# Patient Record
Sex: FEMALE | Race: WHITE | Employment: UNEMPLOYED | URBAN - METROPOLITAN AREA
[De-identification: names, ages, dates, MRNs, and addresses within clinical notes are randomized per-mention and may not be internally consistent; named-entity substitution may affect disease eponyms.]

---

## 2018-04-26 ENCOUNTER — HOSPITAL ENCOUNTER (OUTPATIENT)
Age: 83
Discharge: HOME | End: 2018-04-28
Payer: COMMERCIAL

## 2018-04-26 DIAGNOSIS — E11.8 TYPE 2 DIABETES MELLITUS WITH COMPLICATION, WITHOUT LONG-TERM CURRENT USE OF INSULIN (HCC): ICD-10-CM

## 2018-04-26 LAB
ALBUMIN SERPL-MCNC: 4.1 G/DL (ref 3.5–5.2)
ALP BLD-CCNC: 74 U/L (ref 35–104)
ALT SERPL-CCNC: 16 U/L (ref 0–32)
ANION GAP SERPL CALCULATED.3IONS-SCNC: 18 MMOL/L (ref 7–16)
AST SERPL-CCNC: 30 U/L (ref 0–31)
BILIRUB SERPL-MCNC: 0.6 MG/DL (ref 0–1.2)
BUN BLDV-MCNC: 31 MG/DL (ref 8–23)
CALCIUM SERPL-MCNC: 9.5 MG/DL (ref 8.6–10.2)
CHLORIDE BLD-SCNC: 99 MMOL/L (ref 98–107)
CO2: 23 MMOL/L (ref 22–29)
CREAT SERPL-MCNC: 1.2 MG/DL (ref 0.5–1)
CREATININE URINE: 39 MG/DL (ref 29–226)
GFR AFRICAN AMERICAN: 51
GFR NON-AFRICAN AMERICAN: 42 ML/MIN/1.73
GLUCOSE BLD-MCNC: 126 MG/DL (ref 74–109)
HBA1C MFR BLD: 8.6 % (ref 4.8–5.9)
MICROALBUMIN UR-MCNC: 459.9 MG/L
MICROALBUMIN/CREAT UR-RTO: 1179.2 (ref 0–30)
POTASSIUM SERPL-SCNC: 3.8 MMOL/L (ref 3.5–5)
PROTEIN PROTEIN: 70 MG/DL (ref 0–12)
PROTEIN/CREAT RATIO: 1.8
PROTEIN/CREAT RATIO: 1.8 (ref 0–0.2)
SODIUM BLD-SCNC: 140 MMOL/L (ref 132–146)
TOTAL PROTEIN: 6.8 G/DL (ref 6.4–8.3)

## 2018-04-26 PROCEDURE — 82570 ASSAY OF URINE CREATININE: CPT

## 2018-04-26 PROCEDURE — 82044 UR ALBUMIN SEMIQUANTITATIVE: CPT

## 2018-04-26 PROCEDURE — 84156 ASSAY OF PROTEIN URINE: CPT

## 2018-04-26 PROCEDURE — 83036 HEMOGLOBIN GLYCOSYLATED A1C: CPT

## 2018-04-26 PROCEDURE — 80053 COMPREHEN METABOLIC PANEL: CPT

## 2018-08-02 ENCOUNTER — HOSPITAL ENCOUNTER (OUTPATIENT)
Age: 83
Discharge: HOME | End: 2018-08-04
Payer: COMMERCIAL

## 2018-08-02 DIAGNOSIS — E11.8 TYPE 2 DIABETES MELLITUS WITH COMPLICATION, WITHOUT LONG-TERM CURRENT USE OF INSULIN (HCC): ICD-10-CM

## 2018-08-02 DIAGNOSIS — I42.9 CARDIOMYOPATHY, UNSPECIFIED TYPE (HCC): ICD-10-CM

## 2018-08-02 LAB
ALBUMIN SERPL-MCNC: 3.9 G/DL (ref 3.5–5.2)
ALP BLD-CCNC: 81 U/L (ref 35–104)
ALT SERPL-CCNC: 23 U/L (ref 0–32)
ANION GAP SERPL CALCULATED.3IONS-SCNC: 18 MMOL/L (ref 7–16)
AST SERPL-CCNC: 39 U/L (ref 0–31)
BASOPHILS ABSOLUTE: 0.02 E9/L (ref 0–0.2)
BASOPHILS RELATIVE PERCENT: 0.2 % (ref 0–2)
BILIRUB SERPL-MCNC: 0.6 MG/DL (ref 0–1.2)
BUN BLDV-MCNC: 36 MG/DL (ref 8–23)
CALCIUM SERPL-MCNC: 9.1 MG/DL (ref 8.6–10.2)
CHLORIDE BLD-SCNC: 99 MMOL/L (ref 98–107)
CO2: 22 MMOL/L (ref 22–29)
CREAT SERPL-MCNC: 1.3 MG/DL (ref 0.5–1)
EOSINOPHILS ABSOLUTE: 0.09 E9/L (ref 0.05–0.5)
EOSINOPHILS RELATIVE PERCENT: 1.1 % (ref 0–6)
GFR AFRICAN AMERICAN: 46
GFR NON-AFRICAN AMERICAN: 38 ML/MIN/1.73
GLUCOSE BLD-MCNC: 132 MG/DL (ref 74–109)
HBA1C MFR BLD: 8.4 % (ref 4–5.6)
HCT VFR BLD CALC: 37.1 % (ref 34–48)
HEMOGLOBIN: 10.8 G/DL (ref 11.5–15.5)
IMMATURE GRANULOCYTES #: 0.06 E9/L
IMMATURE GRANULOCYTES %: 0.7 % (ref 0–5)
LYMPHOCYTES ABSOLUTE: 1.15 E9/L (ref 1.5–4)
LYMPHOCYTES RELATIVE PERCENT: 14.4 % (ref 20–42)
MCH RBC QN AUTO: 22.2 PG (ref 26–35)
MCHC RBC AUTO-ENTMCNC: 29.1 % (ref 32–34.5)
MCV RBC AUTO: 76.2 FL (ref 80–99.9)
MONOCYTES ABSOLUTE: 0.76 E9/L (ref 0.1–0.95)
MONOCYTES RELATIVE PERCENT: 9.5 % (ref 2–12)
NEUTROPHILS ABSOLUTE: 5.93 E9/L (ref 1.8–7.3)
NEUTROPHILS RELATIVE PERCENT: 74.1 % (ref 43–80)
PDW BLD-RTO: 20 FL (ref 11.5–15)
PLATELET # BLD: 231 E9/L (ref 130–450)
PMV BLD AUTO: 10.7 FL (ref 7–12)
POTASSIUM SERPL-SCNC: 4 MMOL/L (ref 3.5–5)
RBC # BLD: 4.87 E12/L (ref 3.5–5.5)
SODIUM BLD-SCNC: 139 MMOL/L (ref 132–146)
TOTAL PROTEIN: 6.7 G/DL (ref 6.4–8.3)
WBC # BLD: 8 E9/L (ref 4.5–11.5)

## 2018-08-02 PROCEDURE — 85025 COMPLETE CBC W/AUTO DIFF WBC: CPT

## 2018-08-02 PROCEDURE — 80053 COMPREHEN METABOLIC PANEL: CPT

## 2018-08-02 PROCEDURE — 83036 HEMOGLOBIN GLYCOSYLATED A1C: CPT

## 2018-11-23 ENCOUNTER — HOSPITAL ENCOUNTER (OUTPATIENT)
Age: 83
Discharge: HOME | End: 2018-11-23
Payer: COMMERCIAL

## 2018-11-23 DIAGNOSIS — E78.2 MIXED HYPERLIPIDEMIA: ICD-10-CM

## 2018-11-23 DIAGNOSIS — I10 ESSENTIAL HYPERTENSION: ICD-10-CM

## 2018-11-23 DIAGNOSIS — E11.8 TYPE 2 DIABETES MELLITUS WITH COMPLICATION, WITHOUT LONG-TERM CURRENT USE OF INSULIN (HCC): ICD-10-CM

## 2018-11-23 DIAGNOSIS — E55.9 VITAMIN D DEFICIENCY: ICD-10-CM

## 2018-11-23 LAB
ALBUMIN SERPL-MCNC: 3.7 G/DL (ref 3.5–5.2)
ALP BLD-CCNC: 79 U/L (ref 35–104)
ALT SERPL-CCNC: 15 U/L (ref 0–32)
ANION GAP SERPL CALCULATED.3IONS-SCNC: 15 MMOL/L (ref 7–16)
AST SERPL-CCNC: 23 U/L (ref 0–31)
BASOPHILS ABSOLUTE: 0.03 E9/L (ref 0–0.2)
BASOPHILS RELATIVE PERCENT: 0.4 % (ref 0–2)
BILIRUB SERPL-MCNC: 0.5 MG/DL (ref 0–1.2)
BUN BLDV-MCNC: 34 MG/DL (ref 8–23)
CALCIUM SERPL-MCNC: 8.8 MG/DL (ref 8.6–10.2)
CHLORIDE BLD-SCNC: 103 MMOL/L (ref 98–107)
CHOLESTEROL, TOTAL: 205 MG/DL (ref 0–199)
CO2: 22 MMOL/L (ref 22–29)
CREAT SERPL-MCNC: 1.5 MG/DL (ref 0.5–1)
EOSINOPHILS ABSOLUTE: 0.1 E9/L (ref 0.05–0.5)
EOSINOPHILS RELATIVE PERCENT: 1.3 % (ref 0–6)
GFR AFRICAN AMERICAN: 39
GFR NON-AFRICAN AMERICAN: 32 ML/MIN/1.73
GLUCOSE BLD-MCNC: 169 MG/DL (ref 74–99)
HBA1C MFR BLD: 8.6 % (ref 4–5.6)
HCT VFR BLD CALC: 32.5 % (ref 34–48)
HDLC SERPL-MCNC: 43 MG/DL
HEMOGLOBIN: 9.7 G/DL (ref 11.5–15.5)
IMMATURE GRANULOCYTES #: 0.08 E9/L
IMMATURE GRANULOCYTES %: 1 % (ref 0–5)
LDL CHOLESTEROL CALCULATED: 118 MG/DL (ref 0–99)
LYMPHOCYTES ABSOLUTE: 1.21 E9/L (ref 1.5–4)
LYMPHOCYTES RELATIVE PERCENT: 15.6 % (ref 20–42)
MCH RBC QN AUTO: 22.9 PG (ref 26–35)
MCHC RBC AUTO-ENTMCNC: 29.8 % (ref 32–34.5)
MCV RBC AUTO: 76.8 FL (ref 80–99.9)
MONOCYTES ABSOLUTE: 0.67 E9/L (ref 0.1–0.95)
MONOCYTES RELATIVE PERCENT: 8.7 % (ref 2–12)
NEUTROPHILS ABSOLUTE: 5.65 E9/L (ref 1.8–7.3)
NEUTROPHILS RELATIVE PERCENT: 73 % (ref 43–80)
PDW BLD-RTO: 18.2 FL (ref 11.5–15)
PLATELET # BLD: 246 E9/L (ref 130–450)
PMV BLD AUTO: 11.1 FL (ref 7–12)
POTASSIUM SERPL-SCNC: 3.9 MMOL/L (ref 3.5–5)
RBC # BLD: 4.23 E12/L (ref 3.5–5.5)
SODIUM BLD-SCNC: 140 MMOL/L (ref 132–146)
TOTAL PROTEIN: 6.4 G/DL (ref 6.4–8.3)
TRIGL SERPL-MCNC: 219 MG/DL (ref 0–149)
VITAMIN D 25-HYDROXY: 20 NG/ML (ref 30–100)
VLDLC SERPL CALC-MCNC: 44 MG/DL
WBC # BLD: 7.7 E9/L (ref 4.5–11.5)

## 2018-11-23 PROCEDURE — 80053 COMPREHEN METABOLIC PANEL: CPT

## 2018-11-23 PROCEDURE — 85025 COMPLETE CBC W/AUTO DIFF WBC: CPT

## 2018-11-23 PROCEDURE — 80061 LIPID PANEL: CPT

## 2018-11-23 PROCEDURE — 83036 HEMOGLOBIN GLYCOSYLATED A1C: CPT

## 2018-11-23 PROCEDURE — 82306 VITAMIN D 25 HYDROXY: CPT

## 2018-11-23 PROCEDURE — 36415 COLL VENOUS BLD VENIPUNCTURE: CPT

## 2019-05-15 ENCOUNTER — TELEPHONE (OUTPATIENT)
Dept: PHARMACY | Facility: CLINIC | Age: 84
End: 2019-05-15

## 2019-05-15 NOTE — TELEPHONE ENCOUNTER
CLINICAL PHARMACY: ADHERENCE REVIEW    Identified care gap per [employer]: Glucotrol XL 5 mg adherence  Per records, appears 30-day supply last filled 3/11/19    Per Jose Antonio Triana: [facility]: [phone]:  Glucotrol XL 5 mg last filled on 5/10/19 for a 30-day supply. Per [facility] the prescription is written for 90-day supply but the patients insurance will not cover Brand Name medications; Patient is receiving assistance from the Pharmacy: Cost: $56.92 / 30-day supply    Reached patient's daughter: Yasir Cedeño to review. Barrier(s) identified: Patient had side effects with the generic medication  Education provided.

## 2019-05-15 NOTE — TELEPHONE ENCOUNTER
CLINICAL PHARMACY CONSULT: MED RECONCILIATION/REVIEW ADDENDUM    For Pharmacy Admin Tracking Only    PHSO: Yes  Total # of Interventions Recommended: 1  - Maintenance Safety Lab Monitoring #: 1  - New Therapy Lab Monitoring #: 1  Recommended intervention potential cost savings: 0  Time Spent (min): 1000 [facility], [address] [place] [address]

## 2019-05-17 ENCOUNTER — HOSPITAL ENCOUNTER (OUTPATIENT)
Age: 84
Discharge: HOME | End: 2019-05-17
Payer: COMMERCIAL

## 2019-05-17 ENCOUNTER — HOSPITAL ENCOUNTER (OUTPATIENT)
Dept: INFUSION THERAPY | Age: 84
Discharge: HOME | End: 2019-05-17
Payer: COMMERCIAL

## 2019-05-17 VITALS
DIASTOLIC BLOOD PRESSURE: 67 MMHG | SYSTOLIC BLOOD PRESSURE: 151 MMHG | RESPIRATION RATE: 18 BRPM | OXYGEN SATURATION: 98 % | HEART RATE: 77 BPM | TEMPERATURE: 98.1 F

## 2019-05-17 DIAGNOSIS — D64.9 ANEMIA, UNSPECIFIED TYPE: Primary | ICD-10-CM

## 2019-05-17 LAB
ABO/RH: NORMAL
ANTIBODY SCREEN: NORMAL
BLOOD BANK DISPENSE STATUS: NORMAL
BLOOD BANK PRODUCT CODE: NORMAL
BPU ID: NORMAL
DESCRIPTION BLOOD BANK: NORMAL

## 2019-05-17 PROCEDURE — 36415 COLL VENOUS BLD VENIPUNCTURE: CPT

## 2019-05-17 PROCEDURE — 86923 COMPATIBILITY TEST ELECTRIC: CPT

## 2019-05-17 PROCEDURE — 2580000003 HC RX 258

## 2019-05-17 PROCEDURE — 2580000003 HC RX 258: Performed by: INTERNAL MEDICINE

## 2019-05-17 PROCEDURE — 36430 TRANSFUSION BLD/BLD COMPNT: CPT

## 2019-05-17 PROCEDURE — P9016 RBC LEUKOCYTES REDUCED: HCPCS

## 2019-05-17 PROCEDURE — 6370000000 HC RX 637 (ALT 250 FOR IP)

## 2019-05-17 PROCEDURE — 86900 BLOOD TYPING SEROLOGIC ABO: CPT

## 2019-05-17 PROCEDURE — 86901 BLOOD TYPING SEROLOGIC RH(D): CPT

## 2019-05-17 PROCEDURE — 86850 RBC ANTIBODY SCREEN: CPT

## 2019-05-17 RX ORDER — METHYLPREDNISOLONE SODIUM SUCCINATE 125 MG/2ML
125 INJECTION, POWDER, LYOPHILIZED, FOR SOLUTION INTRAMUSCULAR; INTRAVENOUS ONCE
Status: CANCELLED | OUTPATIENT
Start: 2019-05-17

## 2019-05-17 RX ORDER — EPINEPHRINE 1 MG/ML
0.3 INJECTION, SOLUTION, CONCENTRATE INTRAVENOUS PRN
Status: CANCELLED | OUTPATIENT
Start: 2019-05-17

## 2019-05-17 RX ORDER — 0.9 % SODIUM CHLORIDE 0.9 %
10 VIAL (ML) INJECTION ONCE
Status: CANCELLED | OUTPATIENT
Start: 2019-05-17

## 2019-05-17 RX ORDER — DIPHENHYDRAMINE HCL 25 MG
25 TABLET ORAL ONCE
Status: COMPLETED | OUTPATIENT
Start: 2019-05-17 | End: 2019-05-17

## 2019-05-17 RX ORDER — ACETAMINOPHEN 325 MG/1
650 TABLET ORAL ONCE
Status: COMPLETED | OUTPATIENT
Start: 2019-05-17 | End: 2019-05-17

## 2019-05-17 RX ORDER — DIPHENHYDRAMINE HCL 25 MG
TABLET ORAL
Status: COMPLETED
Start: 2019-05-17 | End: 2019-05-17

## 2019-05-17 RX ORDER — SODIUM CHLORIDE 9 MG/ML
INJECTION, SOLUTION INTRAVENOUS CONTINUOUS
Status: CANCELLED | OUTPATIENT
Start: 2019-05-17

## 2019-05-17 RX ORDER — 0.9 % SODIUM CHLORIDE 0.9 %
250 INTRAVENOUS SOLUTION INTRAVENOUS ONCE
Status: CANCELLED | OUTPATIENT
Start: 2019-05-17

## 2019-05-17 RX ORDER — SODIUM CHLORIDE 9 MG/ML
INJECTION, SOLUTION INTRAVENOUS
Status: COMPLETED
Start: 2019-05-17 | End: 2019-05-17

## 2019-05-17 RX ORDER — DIPHENHYDRAMINE HCL 25 MG
25 TABLET ORAL ONCE
Status: CANCELLED | OUTPATIENT
Start: 2019-05-17

## 2019-05-17 RX ORDER — 0.9 % SODIUM CHLORIDE 0.9 %
250 INTRAVENOUS SOLUTION INTRAVENOUS ONCE
Status: COMPLETED | OUTPATIENT
Start: 2019-05-17 | End: 2019-05-17

## 2019-05-17 RX ORDER — DIPHENHYDRAMINE HYDROCHLORIDE 50 MG/ML
50 INJECTION INTRAMUSCULAR; INTRAVENOUS ONCE
Status: CANCELLED | OUTPATIENT
Start: 2019-05-17

## 2019-05-17 RX ORDER — SODIUM CHLORIDE 0.9 % (FLUSH) 0.9 %
20 SYRINGE (ML) INJECTION PRN
Status: CANCELLED | OUTPATIENT
Start: 2019-05-17

## 2019-05-17 RX ORDER — SODIUM CHLORIDE 0.9 % (FLUSH) 0.9 %
20 SYRINGE (ML) INJECTION PRN
Status: DISCONTINUED | OUTPATIENT
Start: 2019-05-17 | End: 2019-05-18 | Stop reason: HOSPADM

## 2019-05-17 RX ORDER — ACETAMINOPHEN 325 MG/1
650 TABLET ORAL ONCE
Status: CANCELLED | OUTPATIENT
Start: 2019-05-17

## 2019-05-17 RX ORDER — ACETAMINOPHEN 325 MG/1
TABLET ORAL
Status: COMPLETED
Start: 2019-05-17 | End: 2019-05-17

## 2019-05-17 RX ADMIN — ACETAMINOPHEN 650 MG: 325 TABLET ORAL at 14:03

## 2019-05-17 RX ADMIN — SODIUM CHLORIDE 250 ML: 9 INJECTION, SOLUTION INTRAVENOUS at 14:09

## 2019-05-17 RX ADMIN — Medication 250 ML: at 14:09

## 2019-05-17 RX ADMIN — Medication 25 MG: at 14:02

## 2019-05-17 RX ADMIN — DIPHENHYDRAMINE HCL 25 MG: 25 TABLET ORAL at 14:02

## 2019-05-17 RX ADMIN — Medication 10 ML: at 13:55

## 2019-05-17 RX ADMIN — ACETAMINOPHEN 650 MG: 325 TABLET, FILM COATED ORAL at 14:03

## 2019-05-17 ASSESSMENT — PAIN SCALES - GENERAL: PAINLEVEL_OUTOF10: 10

## 2019-05-17 ASSESSMENT — PAIN DESCRIPTION - LOCATION: LOCATION: HIP

## 2019-05-17 ASSESSMENT — PAIN DESCRIPTION - ORIENTATION: ORIENTATION: RIGHT

## 2019-05-17 ASSESSMENT — PAIN DESCRIPTION - PAIN TYPE: TYPE: CHRONIC PAIN

## 2019-06-11 ENCOUNTER — TELEPHONE (OUTPATIENT)
Dept: NON INVASIVE DIAGNOSTICS | Age: 84
End: 2019-06-11

## 2019-06-11 NOTE — TELEPHONE ENCOUNTER
Spoke with daughter, Eleno Alexandra regarding upcoming appoinment on June 13, 2019. Instructions given. Barbie Guajardo

## 2019-06-17 ENCOUNTER — TELEPHONE (OUTPATIENT)
Dept: NON INVASIVE DIAGNOSTICS | Age: 84
End: 2019-06-17

## 2019-06-18 ENCOUNTER — TELEPHONE (OUTPATIENT)
Dept: CARDIAC CATH/INVASIVE PROCEDURES | Age: 84
End: 2019-06-18

## 2019-06-19 ENCOUNTER — ANESTHESIA (OUTPATIENT)
Dept: CARDIAC CATH/INVASIVE PROCEDURES | Age: 84
End: 2019-06-19

## 2019-06-19 ENCOUNTER — HOSPITAL ENCOUNTER (OUTPATIENT)
Dept: CARDIAC CATH/INVASIVE PROCEDURES | Age: 84
Discharge: HOME | End: 2019-06-19
Payer: COMMERCIAL

## 2019-06-19 ENCOUNTER — ANESTHESIA EVENT (OUTPATIENT)
Dept: CARDIAC CATH/INVASIVE PROCEDURES | Age: 84
End: 2019-06-19

## 2019-06-19 VITALS
HEART RATE: 84 BPM | BODY MASS INDEX: 30.36 KG/M2 | RESPIRATION RATE: 20 BRPM | TEMPERATURE: 97.7 F | DIASTOLIC BLOOD PRESSURE: 69 MMHG | SYSTOLIC BLOOD PRESSURE: 110 MMHG | HEIGHT: 62 IN | WEIGHT: 165 LBS

## 2019-06-19 VITALS
DIASTOLIC BLOOD PRESSURE: 46 MMHG | RESPIRATION RATE: 20 BRPM | OXYGEN SATURATION: 98 % | SYSTOLIC BLOOD PRESSURE: 86 MMHG

## 2019-06-19 PROCEDURE — 2580000003 HC RX 258: Performed by: NURSE ANESTHETIST, CERTIFIED REGISTERED

## 2019-06-19 PROCEDURE — 3700000000 HC ANESTHESIA ATTENDED CARE

## 2019-06-19 PROCEDURE — 3700000001 HC ADD 15 MINUTES (ANESTHESIA)

## 2019-06-19 PROCEDURE — 6360000002 HC RX W HCPCS: Performed by: NURSE ANESTHETIST, CERTIFIED REGISTERED

## 2019-06-19 PROCEDURE — 2709999900 HC NON-CHARGEABLE SUPPLY

## 2019-06-19 PROCEDURE — 93312 ECHO TRANSESOPHAGEAL: CPT

## 2019-06-19 PROCEDURE — 93325 DOPPLER ECHO COLOR FLOW MAPG: CPT

## 2019-06-19 PROCEDURE — 2580000003 HC RX 258: Performed by: INTERNAL MEDICINE

## 2019-06-19 PROCEDURE — 93321 DOPPLER ECHO F-UP/LMTD STD: CPT

## 2019-06-19 RX ORDER — SODIUM CHLORIDE 9 MG/ML
INJECTION, SOLUTION INTRAVENOUS CONTINUOUS PRN
Status: DISCONTINUED | OUTPATIENT
Start: 2019-06-19 | End: 2019-06-19 | Stop reason: SDUPTHER

## 2019-06-19 RX ORDER — SODIUM CHLORIDE 9 MG/ML
INJECTION, SOLUTION INTRAVENOUS ONCE
Status: COMPLETED | OUTPATIENT
Start: 2019-06-19 | End: 2019-06-19

## 2019-06-19 RX ORDER — PROPOFOL 10 MG/ML
INJECTION, EMULSION INTRAVENOUS PRN
Status: DISCONTINUED | OUTPATIENT
Start: 2019-06-19 | End: 2019-06-19 | Stop reason: SDUPTHER

## 2019-06-19 RX ADMIN — SODIUM CHLORIDE: 9 INJECTION, SOLUTION INTRAVENOUS at 15:20

## 2019-06-19 RX ADMIN — PROPOFOL 160 MG: 10 INJECTION, EMULSION INTRAVENOUS at 15:26

## 2019-06-19 RX ADMIN — SODIUM CHLORIDE: 9 INJECTION, SOLUTION INTRAVENOUS at 13:23

## 2019-06-19 ASSESSMENT — LIFESTYLE VARIABLES: SMOKING_STATUS: 0

## 2019-06-19 NOTE — H&P
Department of Medicine  Section of Cardiology  Attending Procedure History and Physical        Pre-Procedural Diagnosis: Mitral valve regurgitation    Indications: Mitral valve regurgitation    Procedure Planned: Transesophageal echocardiogram to evaluate the severity of mitral valve regurgitation    History obtained from patient    History of Present Illness: The patient is a 80 y.o. female who presents for the above procedure.   With severe mitral valve regurgitation, is here for transesophageal echocardiogram for further evaluation of the mitral valve apparatus prior to surgery versus mitral clip repair of mitral valve regurgitation    Past Medical History:        Diagnosis Date    Arthritis     CHF (congestive heart failure) (HCC)     CKD (chronic kidney disease)     Diabetic eye exam ([facility] 05/21/2015    Dr Kiara Garcia diabetic retinopathy    Hyperlipidemia     Hypertension     Iron deficiency anemia     Type II or unspecified type diabetes mellitus without mention of complication, not stated as uncontrolled     Ulcer of foot ([facility] [address] 6/18/2013    Valvular heart disease      Past Surgical History:        Procedure Laterality Date    ADENOIDECTOMY      APPENDECTOMY      CARPAL TUNNEL RELEASE      CATARACT REMOVAL      CATARACT REMOVAL      CHOLECYSTECTOMY      DOPPLER ECHOCARDIOGRAPHY  06/07/2018    Dr Keith Martinez mitral regurg-mild tricuspid regurg-trace pulmonic regurg-trace to mild aortic regurg    ENDOSCOPY, COLON, DIAGNOSTIC      JOINT REPLACEMENT      BILAT KNEE    ROTATOR CUFF REPAIR      TONSILLECTOMY      TOTAL KNEE ARTHROPLASTY      TRANSESOPHAGEAL ECHOCARDIOGRAM  06/19/2019    DR Maldonado    UPPER GASTROINTESTINAL ENDOSCOPY  05/28/2015    UPPER GASTROINTESTINAL ENDOSCOPY  11/07/2017    DR JEFF SULLIVAN    US KIDNEY DUPLEX BILAT  09/17/2016    S[facility]ep  Imaging-normal    VASCULAR SURGERY  4/18/2013    Right leg angiogram by DR Francis     Medications:    Current petechiae  ALLERGIC/IMMUNOLOGIC:  negative for urticaria, hay fever and angioedema  ENDOCRINE:  negative for heat intolerance, cold intolerance, tremor, hair loss and diabetic symptoms including neither polyuria nor polydipsia nor blurred vision  MUSCULOSKELETAL:  negative for  myalgias, arthralgias, joint swelling, stiff joints and decreased range of motion  NEUROLOGICAL:  negative for memory problems, speech problems, visual disturbance, dysphagia, weakness and numbness      PHYSICAL EXAM    /69   Pulse 84   Temp 97.7 °F (36.5 °C)   Resp 20   Ht 5' 2\" (1.575 m)   Wt 165 lb (74.8 kg)   BMI 30.18 kg/m²   CONSTITUTIONAL:  awake, alert, cooperative, no apparent distress, and appears stated age  HEAD:  normocepalic, without obvious abnormality, atraumatic  NECK:  Supple, symmetrical, trachea midline, no adenopathy, thyroid symmetric, not enlarged and no tenderness, skin normal  LUNGS:  No increased work of breathing, good air exchange, clear to auscultation bilaterally, no crackles or wheezing  CARDIOVASCULAR:  Normal apical impulse, regular rate and rhythm, normal S1 and S2, no S3 or S4, 4/6 systolic murmur at the apex, no edema, no JVD, no carotid bruit. ABDOMEN:  Soft, nontender, no masses, no hepatomegaly, no splenomegaly, BS+  MUSCULOSKELETAL:  No clubbing no cyanosis. there is no redness, warmth, or swelling of the joints  full range of motion noted  NEUROLOGIC:  Alert, awake,oriented x3  SKIN:  no bruising or bleeding, normal skin color, texture, turgor and no redness, warmth, or swelling    DATA    CBC:    Lab Results   Component Value Date    WBC 7.7 11/23/2018    RBC 4.23 11/23/2018    HGB 9.7 11/23/2018    HCT 32.5 11/23/2018    MCV 76.8 11/23/2018    RDW 18.2 11/23/2018     11/23/2018     Platelet:    Lab Results   Component Value Date     11/23/2018     BUN/Cr:    Lab Results   Component Value Date    BUN 34 11/23/2018     Potassium:    Lab Results   Component Value Date    K 3.9 11/23/2018     PT/INR:  No results found for: PTINR  PTT:  No results found for: APTT      ASSESSMENT AND PLAN    1. Patient is a 80 y.o. female with above specified procedure planned transesophageal echocardiogram under conscious sedation  Expected Sedation/Anesthesia Type:  conscious sedation    2. ASA ([address] Anesthesiology) Anesthesia Status:  2    3. Procedure options, risks and benefits reviewed with Patient. Patient expresses understanding    4. Patient has not been on anticoagulation medications    5. Consent has been signed:   Yes

## 2019-06-19 NOTE — ANESTHESIA PRE PROCEDURE
Department of Anesthesiology  Preprocedure Note       Name:  Danita Hendricks   Age:  80 y.o.  :  1927                                          MRN:  [id number]         Date:  2019      Surgeon: Ofelia Tafoya    Procedure: SEY JARET    Medications prior to admission:   Prior to Admission medications    Medication Sig Start Date End Date Taking?  Authorizing Provider   predniSONE (DELTASONE) 5 MG tablet Take 1 tablet by mouth daily 19   Tomas Esquivel MD   carvedilol (COREG) 3.125 MG tablet TAKE ONE TABLET BY MOUTH TWO TIMES A DAY 19   Tomas Esquivel MD   [facility] ULTRA TEST strip test blood sugar twice daily 19   Tomas Esquivel MD   GLUCOTROL XL 5 MG extended release tablet TAKE ONE TABLET BY MOUTH DAILY 3/11/19   Tomas Esquivel MD   potassium chloride (KLOR-CON M) 20 MEQ extended release tablet TAKE ONE TABLET BY MOUTH TWO TIMES A DAY 3/11/19   Tomas Esquivel MD   vitamin D (ERGOCALCIFEROL) 12676 units CAPS capsule TAKE 1 CAPSULE BY MOUTH ONCE A WEEK 19   Tomas Esquivel MD   colchicine (COLCRYS) 0.6 MG tablet Take 0.6 mg by mouth daily    Historical Provider, MD   sacubitril-valsartan (ENTRESTO) 24-26 MG per tablet Take 0.5 tablets by mouth 2 times daily     Historical Provider, MD   allopurinol (ZYLOPRIM) 100 MG tablet Take 1 tablet by mouth 2 times daily 17   Historical Provider, MD   metoclopramide (REGLAN) 5 MG tablet Take 0.5 tablets by mouth 3 times daily 17   Zak Gonzalez MD   furosemide (LASIX) 40 MG tablet TAKE ONE TABLET BY MOUTH DAILY 10/11/17   Historical Provider, MD   esomeprazole (NEXIUM) 40 MG delayed release capsule Take 40 mg by mouth every morning (before breakfast)    Historical Provider, MD   clopidogrel (PLAVIX) 75 MG tablet Take 75 mg by mouth 14   Historical Provider, MD       Current medications:    Current Outpatient Medications   Medication Sig Dispense Refill    predniSONE (DELTASONE) 5 MG tablet Take 1 tablet by mouth daily 30 tablet 3    carvedilol (COREG) 3.125 MG tablet TAKE ONE TABLET BY MOUTH TWO TIMES A  tablet 1    ONE TOUCH ULTRA TEST strip test blood sugar twice daily 100 strip 3    GLUCOTROL XL 5 MG extended release tablet TAKE ONE TABLET BY MOUTH DAILY 90 tablet 1    potassium chloride (KLOR-CON M) 20 MEQ extended release tablet TAKE ONE TABLET BY MOUTH TWO TIMES A  tablet 1    vitamin D (ERGOCALCIFEROL) 14780 units CAPS capsule TAKE 1 CAPSULE BY MOUTH ONCE A WEEK 12 capsule 1    colchicine (COLCRYS) 0.6 MG tablet Take 0.6 mg by mouth daily      sacubitril-valsartan (ENTRESTO) 24-26 MG per tablet Take 0.5 tablets by mouth 2 times daily       allopurinol (ZYLOPRIM) 100 MG tablet Take 1 tablet by mouth 2 times daily  3    metoclopramide (REGLAN) 5 MG tablet Take 0.5 tablets by mouth 3 times daily 60 tablet 3    furosemide (LASIX) 40 MG tablet TAKE ONE TABLET BY MOUTH DAILY  3    esomeprazole (NEXIUM) 40 MG delayed release capsule Take 40 mg by mouth every morning (before breakfast)      clopidogrel (PLAVIX) 75 MG tablet Take 75 mg by mouth       No current facility-administered medications for this encounter. Allergies:     Allergies   Allergen Reactions    Iodine     Iv Dye [Iodides]        Problem List:    Patient Active Problem List   Diagnosis Code    PVD (peripheral vascular disease) with claudication ([address] I73.9    Atherosclerosis of native arteries of the extremities with ulceration ([address] I70.25    Ulcer of foot ([address] L97.509    Abnormal nuclear stress test R94.39    Cardiomyopathy ([address] I42.9    VHD (valvular heart disease) I38    Severe mitral regurgitation I34.0    Nonrheumatic aortic valve stenosis I35.0    Pulmonary HTN (HCC) I27.20    Chronic systolic congestive heart failure (HCC) I50.22    Essential hypertension I10    Mixed hyperlipidemia E78.2    CKD (chronic kidney disease) stage 3, GFR 30-59 ml/min ([facility]) N18.3    Type 2 diabetes mellitus with complication, without long-term current use of insulin (HCC) E11.8    Arthritis M19.90    H/O: gout Z87.39    Hiatal hernia K44.9    Gastroesophageal reflux disease K21.9    Gastric ulcer K25.9    Gastric polyps K31.7    Pyloric stenosis K31.1    Chronic blood loss anemia D50.0    OA (osteoarthritis) M19.90    Vitamin D deficiency E55.9    Anemia D64.9       Past Medical History:        Diagnosis Date    Arthritis     [facility]     CKD (chronic kidney disease)     Diabetic eye exam ([facility] [address] 05/21/2015    Dr Noemi Gao diabetic retinopathy    Hyperlipidemia     Hypertension     Iron deficiency anemia     Type II or unspecified type diabetes mellitus without mention of complication, not stated as uncontrolled     Ulcer of foot ([facility] [address] 6/18/2013    Valvular heart disease        Past Surgical History:        Procedure Laterality Date    ADENOIDECTOMY      APPENDECTOMY      CARPAL TUNNEL RELEASE      CATARACT REMOVAL      CATARACT REMOVAL      CHOLECYSTECTOMY      DOPPLER ECHOCARDIOGRAPHY  06/07/2018    Dr Taty Diaz mitral regurg-mild tricuspid regurg-trace pulmonic regurg-trace to mild aortic regurg    ENDOSCOPY, COLON, DIAGNOSTIC      JOINT REPLACEMENT      BILAT KNEE    ROTATOR CUFF REPAIR      TONSILLECTOMY      TOTAL KNEE ARTHROPLASTY      UPPER GASTROINTESTINAL ENDOSCOPY  05/28/2015    UPPER GASTROINTESTINAL ENDOSCOPY  11/07/2017    DR JEFF SULLIVAN    US KIDNEY DUPLEX BILAT  09/17/2016    [facility]-normal    VASCULAR SURGERY  4/18/2013    Right leg angiogram by DR Francis       Social History:    Social History     Tobacco Use    Smoking status: Never Smoker    Smokeless tobacco: Never Used   Substance Use Topics    Alcohol use: Yes     Comment: rare wine;  drinks 1 cup of coffee daily;  occas hot tea                                Counseling given: Not Answered      Vital Signs (Current): There were no vitals filed for this visit. BP Readings from Last 3 Encounters:   05/17/19 (!) 151/67   12/06/18 118/78   11/15/18 138/76       NPO Status:  16 hrs                                                                               BMI:   Wt Readings from Last 3 Encounters:   12/06/18 167 lb 12.8 oz (76.1 kg)   11/15/18 168 lb 3.2 oz (76.3 kg)   08/02/18 165 lb 3.2 oz (74.9 kg)     There is no height or weight on file to calculate BMI.    CBC:   Lab Results   Component Value Date    WBC 7.7 11/23/2018    RBC 4.23 11/23/2018    HGB 9.7 11/23/2018    HCT 32.5 11/23/2018    MCV 76.8 11/23/2018    RDW 18.2 11/23/2018     11/23/2018       CMP:   Lab Results   Component Value Date     11/23/2018    K 3.9 11/23/2018     11/23/2018    CO2 22 11/23/2018    BUN 34 11/23/2018    CREATININE 1.5 11/23/2018    GFRAA 39 11/23/2018    LABGLOM 32 11/23/2018    GLUCOSE 169 11/23/2018    PROT 6.4 11/23/2018    CALCIUM 8.8 11/23/2018    BILITOT 0.5 11/23/2018    ALKPHOS 79 11/23/2018    AST 23 11/23/2018    ALT 15 11/23/2018       POC Tests: No results for input(s): POCGLU, POCNA, POCK, POCCL, POCBUN, POCHEMO, POCHCT in the last 72 hours.     Coags: No results found for: PROTIME, INR, APTT    HCG (If Applicable): No results found for: PREGTESTUR, PREGSERUM, HCG, HCGQUANT     ABGs: No results found for: PHART, PO2ART, XSW2RRV, OUA9VDW, BEART, Y7TUGBQJ     Type & Screen (If Applicable):  No results found for: LABABO, [address]    Anesthesia Evaluation  Patient summary reviewed and Nursing notes reviewed no history of anesthetic complications:   Airway:         Dental:          Pulmonary:       (-) not a current smoker                          ROS comment: S/p T&A   Cardiovascular:  Exercise tolerance: poor (<4 METS),   (+) hypertension:, valvular problems/murmurs: MR and AS, CHF: systolic, pulmonary hypertension:,                   Neuro/Psych:                ROS comment: TKA, rotator cuff repair, carpal tunnel release GI/Hepatic/Renal:   (+) hiatal hernia, GERD:, PUD, renal disease: CRI,          ROS comment: Pyloric stenosis  S/p cholecystectomy. Endo/Other:    (+) DiabetesType II DM, well controlled, , blood dyscrasia: anemia, arthritis:., .                  ROS comment: Gout  S/p cataract removal Abdominal:           Vascular:   + PVD, aortic or cerebral, . Provider Status: Reviewed   Echo Complete   Order: [id number]   Status:  Edited Result - FINAL   Visible to patient:  No (Not Released) Next appt:  None   Details     Reading Physician Reading Date Result Priority   Elsie DO Chin 9/15/2016    Unknown Provider Result 9/15/2016       Narrative     Transthoracic Echocardiography Report (TTE)     Demographics      Patient Name    [facility]          Gender            Female                   [address] [place]  [id number]       Room Number       8418   Number      Account #       [de-identified]     Procedure Date    09/15/2016      Corporate ID                   Ordering          Staci Leandrochava                                  Physician      Accession       [id number]      Referring   Number                         Physician      Date of Birth   01/12/1927     Sonographer       [address]      Age             80 year(s)     Interpreting      Crispin Damian DO                                  Physician                                                    [id number]                                                    Physician Cardiology                                     Any Other     Procedure    Type of Study      TTE procedure     Procedure Date  Date: 09/15/2016 Start: 02:03 PM    Study Location: Echo Lab  Technical Quality: Adequate visualization    Indications:Shortness of breath. Patient Status: Routine    Height: 63 inches Weight: 160 pounds BSA: 1.76 m^2 BMI: 28.34 kg/m^2    Allergies    - Iodine.      Findings      Left Ventricle   Mild concentric left ventricular hypertrophy   Low normal left ventricular systolic function   Stage I diastolic dysfunction   No wall motion abnormalities   Ejection fraction is visually estimated at 50%. Right Ventricle   Normal right ventricle structure and function. Left Atrium   The left atrium is mildly dilated. Right Atrium   Normal right atrium size. Mitral Valve   Mild mitral annular calcification   Mildly calcified mitral valve leaflets   Moderate posteriorly directed mitral regurgitation      Tricuspid Valve   Trace tricuspid regurgitation. RVSP is 47 mmHg. Aortic Valve   Moderate calcification of the aortic valve   Mild to moderate aortic stenosis   Trace aortic regurgitation      Pulmonic Valve   Trace pulmonic regurgitation      Pericardial Effusion   No evidence of pericardial effusion. Aorta   mildly dilated ascending aorta. Miscellaneous   Normal Inferior Vena Cava diameter and respiratory variation      Conclusions      Summary   Mild concentric left ventricular hypertrophy   Low normal left ventricular systolic function   Stage I diastolic dysfunction   The left atrium is mildly dilated. Moderate posteriorly directed mitral regurgitation   Mild to moderate aortic stenosis   Trace aortic regurgitation   RVSP is 47 mmHg.      mildly dilated ascending aorta. Signature                   Anesthesia Plan      MAC     ASA 4       Induction: intravenous. Use of blood products discussed with patient whom. Plan discussed with attending.                   Gil Kline, EDWARD - CRNA   6/19/2019

## 2019-11-26 ENCOUNTER — INITIAL CONSULT (OUTPATIENT)
Dept: NEUROSURGERY | Age: 84
End: 2019-11-26
Payer: COMMERCIAL

## 2019-11-26 VITALS
BODY MASS INDEX: 32.39 KG/M2 | DIASTOLIC BLOOD PRESSURE: 73 MMHG | HEIGHT: 60 IN | WEIGHT: 165 LBS | SYSTOLIC BLOOD PRESSURE: 130 MMHG | HEART RATE: 78 BPM

## 2019-11-26 DIAGNOSIS — M54.42 ACUTE MIDLINE LOW BACK PAIN WITH LEFT-SIDED SCIATICA: Primary | ICD-10-CM

## 2019-11-26 PROCEDURE — 99203 OFFICE O/P NEW LOW 30 MIN: CPT | Performed by: NEUROLOGICAL SURGERY

## 2019-11-26 RX ORDER — GABAPENTIN 100 MG/1
100 CAPSULE ORAL 3 TIMES DAILY
Qty: 90 CAPSULE | Refills: 5 | Status: SHIPPED
Start: 2019-11-26 | End: 2020-01-01

## 2019-11-26 ASSESSMENT — ENCOUNTER SYMPTOMS
GASTROINTESTINAL NEGATIVE: 1
EYES NEGATIVE: 1
BACK PAIN: 1
ALLERGIC/IMMUNOLOGIC NEGATIVE: 1
ABDOMINAL PAIN: 0
RESPIRATORY NEGATIVE: 1
BOWEL INCONTINENCE: 0

## 2019-12-02 DIAGNOSIS — M54.42 ACUTE MIDLINE LOW BACK PAIN WITH LEFT-SIDED SCIATICA: ICD-10-CM

## 2019-12-06 DIAGNOSIS — M54.42 ACUTE MIDLINE LOW BACK PAIN WITH LEFT-SIDED SCIATICA: ICD-10-CM

## 2019-12-12 DIAGNOSIS — M54.40 ACUTE MIDLINE LOW BACK PAIN WITH SCIATICA, SCIATICA LATERALITY UNSPECIFIED: Primary | ICD-10-CM

## 2020-01-01 ENCOUNTER — HOSPITAL ENCOUNTER (INPATIENT)
Age: 85
LOS: 16 days | Discharge: HOME | DRG: 091 | End: 2020-07-02
Attending: PHYSICAL MEDICINE & REHABILITATION | Admitting: PHYSICAL MEDICINE & REHABILITATION
Payer: MEDICARE

## 2020-01-01 ENCOUNTER — TELEPHONE (OUTPATIENT)
Dept: PHARMACY | Facility: CLINIC | Age: 85
End: 2020-01-01

## 2020-01-01 ENCOUNTER — APPOINTMENT (OUTPATIENT)
Dept: GENERAL RADIOLOGY | Age: 85
End: 2020-01-01
Payer: MEDICARE

## 2020-01-01 ENCOUNTER — APPOINTMENT (OUTPATIENT)
Dept: CT IMAGING | Age: 85
End: 2020-01-01
Payer: MEDICARE

## 2020-01-01 ENCOUNTER — CARE COORDINATION (OUTPATIENT)
Dept: CARE COORDINATION | Age: 85
End: 2020-01-01

## 2020-01-01 ENCOUNTER — APPOINTMENT (OUTPATIENT)
Dept: ULTRASOUND IMAGING | Age: 85
DRG: 091 | End: 2020-01-01
Attending: PHYSICAL MEDICINE & REHABILITATION
Payer: MEDICARE

## 2020-01-01 ENCOUNTER — APPOINTMENT (OUTPATIENT)
Dept: GENERAL RADIOLOGY | Age: 85
DRG: 091 | End: 2020-01-01
Attending: PHYSICAL MEDICINE & REHABILITATION
Payer: MEDICARE

## 2020-01-01 ENCOUNTER — HOSPITAL ENCOUNTER (INPATIENT)
Age: 85
LOS: 3 days | Discharge: HOME | DRG: 291 | End: 2020-08-27
Attending: EMERGENCY MEDICINE | Admitting: INTERNAL MEDICINE
Payer: MEDICARE

## 2020-01-01 ENCOUNTER — HOSPITAL ENCOUNTER (EMERGENCY)
Age: 85
Discharge: ANOTHER HOSPITAL | End: 2020-06-04
Attending: EMERGENCY MEDICINE
Payer: MEDICARE

## 2020-01-01 ENCOUNTER — HOSPITAL ENCOUNTER (EMERGENCY)
Age: 85
Discharge: ANOTHER HOSPITAL | End: 2020-11-25
Attending: EMERGENCY MEDICINE
Payer: MEDICARE

## 2020-01-01 ENCOUNTER — APPOINTMENT (OUTPATIENT)
Dept: GENERAL RADIOLOGY | Age: 85
DRG: 291 | End: 2020-01-01
Payer: MEDICARE

## 2020-01-01 ENCOUNTER — APPOINTMENT (OUTPATIENT)
Dept: INTERVENTIONAL RADIOLOGY/VASCULAR | Age: 85
DRG: 091 | End: 2020-01-01
Attending: PHYSICAL MEDICINE & REHABILITATION
Payer: MEDICARE

## 2020-01-01 ENCOUNTER — HOSPITAL ENCOUNTER (INPATIENT)
Age: 85
LOS: 3 days | Discharge: HOME | DRG: 683 | End: 2020-11-28
Attending: INTERNAL MEDICINE | Admitting: INTERNAL MEDICINE
Payer: MEDICARE

## 2020-01-01 VITALS
SYSTOLIC BLOOD PRESSURE: 132 MMHG | TEMPERATURE: 97.3 F | HEIGHT: 63 IN | RESPIRATION RATE: 116 BRPM | DIASTOLIC BLOOD PRESSURE: 74 MMHG | BODY MASS INDEX: 29.23 KG/M2 | WEIGHT: 165 LBS | OXYGEN SATURATION: 97 % | HEART RATE: 96 BPM

## 2020-01-01 VITALS
WEIGHT: 160 LBS | RESPIRATION RATE: 18 BRPM | HEART RATE: 80 BPM | OXYGEN SATURATION: 96 % | SYSTOLIC BLOOD PRESSURE: 112 MMHG | BODY MASS INDEX: 28.35 KG/M2 | DIASTOLIC BLOOD PRESSURE: 70 MMHG | HEIGHT: 63 IN | TEMPERATURE: 97.5 F

## 2020-01-01 VITALS
WEIGHT: 160 LBS | SYSTOLIC BLOOD PRESSURE: 126 MMHG | RESPIRATION RATE: 16 BRPM | TEMPERATURE: 97.3 F | HEART RATE: 91 BPM | BODY MASS INDEX: 28.35 KG/M2 | DIASTOLIC BLOOD PRESSURE: 59 MMHG | HEIGHT: 63 IN | OXYGEN SATURATION: 99 %

## 2020-01-01 VITALS
DIASTOLIC BLOOD PRESSURE: 34 MMHG | SYSTOLIC BLOOD PRESSURE: 130 MMHG | TEMPERATURE: 97.8 F | HEIGHT: 63 IN | OXYGEN SATURATION: 100 % | RESPIRATION RATE: 16 BRPM | HEART RATE: 80 BPM | BODY MASS INDEX: 28.88 KG/M2 | WEIGHT: 163 LBS

## 2020-01-01 VITALS
OXYGEN SATURATION: 96 % | SYSTOLIC BLOOD PRESSURE: 95 MMHG | TEMPERATURE: 97.6 F | DIASTOLIC BLOOD PRESSURE: 58 MMHG | HEIGHT: 63 IN | BODY MASS INDEX: 31.59 KG/M2 | WEIGHT: 178.3 LBS | RESPIRATION RATE: 16 BRPM | HEART RATE: 98 BPM

## 2020-01-01 LAB
ABO/RH: NORMAL
ALBUMIN SERPL-MCNC: 3.2 G/DL (ref 3.5–4.7)
ALBUMIN SERPL-MCNC: 3.3 G/DL (ref 3.5–5.2)
ALBUMIN SERPL-MCNC: 3.4 G/DL (ref 3.5–5.2)
ALBUMIN SERPL-MCNC: 3.4 G/DL (ref 3.5–5.2)
ALBUMIN SERPL-MCNC: 3.5 G/DL (ref 3.5–5.2)
ALBUMIN SERPL-MCNC: 3.5 G/DL (ref 3.5–5.2)
ALBUMIN SERPL-MCNC: 3.7 G/DL (ref 3.5–5.2)
ALBUMIN SERPL-MCNC: 3.7 G/DL (ref 3.5–5.2)
ALBUMIN SERPL-MCNC: 3.8 G/DL (ref 3.5–5.2)
ALBUMIN SERPL-MCNC: 3.8 G/DL (ref 3.5–5.2)
ALP BLD-CCNC: 106 U/L (ref 35–104)
ALP BLD-CCNC: 112 U/L (ref 35–104)
ALP BLD-CCNC: 115 U/L (ref 35–104)
ALP BLD-CCNC: 121 U/L (ref 35–104)
ALP BLD-CCNC: 83 U/L (ref 35–104)
ALP BLD-CCNC: 84 U/L (ref 35–104)
ALP BLD-CCNC: 88 U/L (ref 35–104)
ALP BLD-CCNC: 88 U/L (ref 35–104)
ALP BLD-CCNC: 91 U/L (ref 35–104)
ALPHA-1-GLOBULIN: 0.3 G/DL (ref 0.2–0.4)
ALPHA-2-GLOBULIN: 1 G/FL (ref 0.5–1)
ALT SERPL-CCNC: 5 U/L (ref 0–32)
ALT SERPL-CCNC: 6 U/L (ref 0–32)
ALT SERPL-CCNC: 6 U/L (ref 0–32)
ALT SERPL-CCNC: 7 U/L (ref 0–32)
ALT SERPL-CCNC: 7 U/L (ref 0–32)
ALT SERPL-CCNC: 8 U/L (ref 0–32)
ALT SERPL-CCNC: 8 U/L (ref 0–32)
ALT SERPL-CCNC: 9 U/L (ref 0–32)
ALT SERPL-CCNC: <5 U/L (ref 0–32)
ANION GAP SERPL CALCULATED.3IONS-SCNC: 11 MMOL/L (ref 7–16)
ANION GAP SERPL CALCULATED.3IONS-SCNC: 11 MMOL/L (ref 7–16)
ANION GAP SERPL CALCULATED.3IONS-SCNC: 13 MMOL/L (ref 7–16)
ANION GAP SERPL CALCULATED.3IONS-SCNC: 14 MMOL/L (ref 7–16)
ANION GAP SERPL CALCULATED.3IONS-SCNC: 15 MMOL/L (ref 7–16)
ANION GAP SERPL CALCULATED.3IONS-SCNC: 17 MMOL/L (ref 7–16)
ANISOCYTOSIS: ABNORMAL
ANTIBODY SCREEN: NORMAL
APTT: 27.9 SEC (ref 24.5–35.1)
AST SERPL-CCNC: 12 U/L (ref 0–31)
AST SERPL-CCNC: 13 U/L (ref 0–31)
AST SERPL-CCNC: 14 U/L (ref 0–31)
AST SERPL-CCNC: 14 U/L (ref 0–31)
AST SERPL-CCNC: 16 U/L (ref 0–31)
AST SERPL-CCNC: 18 U/L (ref 0–31)
AST SERPL-CCNC: 19 U/L (ref 0–31)
AST SERPL-CCNC: 19 U/L (ref 0–31)
AST SERPL-CCNC: 23 U/L (ref 0–31)
BASOPHILS ABSOLUTE: 0 E9/L (ref 0–0.2)
BASOPHILS ABSOLUTE: 0.01 E9/L (ref 0–0.2)
BASOPHILS ABSOLUTE: 0.02 E9/L (ref 0–0.2)
BASOPHILS ABSOLUTE: 0.03 E9/L (ref 0–0.2)
BASOPHILS RELATIVE PERCENT: 0 % (ref 0–2)
BASOPHILS RELATIVE PERCENT: 0.1 % (ref 0–2)
BASOPHILS RELATIVE PERCENT: 0.2 % (ref 0–2)
BASOPHILS RELATIVE PERCENT: 0.3 % (ref 0–2)
BETA GLOBULIN: 1 G/DL (ref 0.8–1.3)
BILIRUB SERPL-MCNC: 0.3 MG/DL (ref 0–1.2)
BILIRUB SERPL-MCNC: 0.4 MG/DL (ref 0–1.2)
BILIRUB SERPL-MCNC: 0.5 MG/DL (ref 0–1.2)
BILIRUB SERPL-MCNC: 0.6 MG/DL (ref 0–1.2)
BILIRUB SERPL-MCNC: 0.7 MG/DL (ref 0–1.2)
BILIRUB SERPL-MCNC: 1 MG/DL (ref 0–1.2)
BILIRUBIN URINE: NEGATIVE
BLOOD BANK DISPENSE STATUS: NORMAL
BLOOD BANK PRODUCT CODE: NORMAL
BLOOD, URINE: NEGATIVE
BPU ID: NORMAL
BUN BLDV-MCNC: 21 MG/DL (ref 8–23)
BUN BLDV-MCNC: 23 MG/DL (ref 8–23)
BUN BLDV-MCNC: 23 MG/DL (ref 8–23)
BUN BLDV-MCNC: 24 MG/DL (ref 8–23)
BUN BLDV-MCNC: 24 MG/DL (ref 8–23)
BUN BLDV-MCNC: 25 MG/DL (ref 8–23)
BUN BLDV-MCNC: 25 MG/DL (ref 8–23)
BUN BLDV-MCNC: 31 MG/DL (ref 8–23)
BUN BLDV-MCNC: 34 MG/DL (ref 8–23)
BUN BLDV-MCNC: 44 MG/DL (ref 8–23)
BUN BLDV-MCNC: 70 MG/DL (ref 8–23)
BUN BLDV-MCNC: 89 MG/DL (ref 8–23)
BUN BLDV-MCNC: 96 MG/DL (ref 8–23)
C DIFF TOXIN/ANTIGEN: NORMAL
CALCIUM SERPL-MCNC: 8.1 MG/DL (ref 8.6–10.2)
CALCIUM SERPL-MCNC: 8.3 MG/DL (ref 8.6–10.2)
CALCIUM SERPL-MCNC: 8.6 MG/DL (ref 8.6–10.2)
CALCIUM SERPL-MCNC: 8.6 MG/DL (ref 8.6–10.2)
CALCIUM SERPL-MCNC: 8.7 MG/DL (ref 8.6–10.2)
CALCIUM SERPL-MCNC: 8.9 MG/DL (ref 8.6–10.2)
CALCIUM SERPL-MCNC: 8.9 MG/DL (ref 8.6–10.2)
CALCIUM SERPL-MCNC: 9 MG/DL (ref 8.6–10.2)
CALCIUM SERPL-MCNC: 9.2 MG/DL (ref 8.6–10.2)
CALCIUM SERPL-MCNC: 9.4 MG/DL (ref 8.6–10.2)
CALCIUM SERPL-MCNC: 9.4 MG/DL (ref 8.6–10.2)
CHLORIDE BLD-SCNC: 100 MMOL/L (ref 98–107)
CHLORIDE BLD-SCNC: 101 MMOL/L (ref 98–107)
CHLORIDE BLD-SCNC: 101 MMOL/L (ref 98–107)
CHLORIDE BLD-SCNC: 102 MMOL/L (ref 98–107)
CHLORIDE BLD-SCNC: 103 MMOL/L (ref 98–107)
CHLORIDE BLD-SCNC: 104 MMOL/L (ref 98–107)
CHLORIDE BLD-SCNC: 91 MMOL/L (ref 98–107)
CHLORIDE BLD-SCNC: 93 MMOL/L (ref 98–107)
CHLORIDE BLD-SCNC: 98 MMOL/L (ref 98–107)
CHLORIDE BLD-SCNC: 99 MMOL/L (ref 98–107)
CHLORIDE BLD-SCNC: 99 MMOL/L (ref 98–107)
CHP ED QC CHECK: YES
CLARITY: CLEAR
CO2: 19 MMOL/L (ref 22–29)
CO2: 21 MMOL/L (ref 22–29)
CO2: 21 MMOL/L (ref 22–29)
CO2: 22 MMOL/L (ref 22–29)
CO2: 23 MMOL/L (ref 22–29)
CO2: 23 MMOL/L (ref 22–29)
CO2: 24 MMOL/L (ref 22–29)
CO2: 24 MMOL/L (ref 22–29)
CO2: 25 MMOL/L (ref 22–29)
CO2: 25 MMOL/L (ref 22–29)
CO2: 26 MMOL/L (ref 22–29)
COLOR: YELLOW
CREAT SERPL-MCNC: 1.3 MG/DL (ref 0.5–1)
CREAT SERPL-MCNC: 1.4 MG/DL (ref 0.5–1)
CREAT SERPL-MCNC: 1.5 MG/DL (ref 0.5–1)
CREAT SERPL-MCNC: 1.7 MG/DL (ref 0.5–1)
CREAT SERPL-MCNC: 2.1 MG/DL (ref 0.5–1)
CREAT SERPL-MCNC: 2.5 MG/DL (ref 0.5–1)
DESCRIPTION BLOOD BANK: NORMAL
EKG ATRIAL RATE: 82 BPM
EKG ATRIAL RATE: 84 BPM
EKG ATRIAL RATE: 88 BPM
EKG P AXIS: 36 DEGREES
EKG P AXIS: 53 DEGREES
EKG P AXIS: 55 DEGREES
EKG P-R INTERVAL: 154 MS
EKG P-R INTERVAL: 164 MS
EKG P-R INTERVAL: 214 MS
EKG Q-T INTERVAL: 380 MS
EKG Q-T INTERVAL: 394 MS
EKG Q-T INTERVAL: 432 MS
EKG QRS DURATION: 108 MS
EKG QRS DURATION: 94 MS
EKG QRS DURATION: 98 MS
EKG QTC CALCULATION (BAZETT): 459 MS
EKG QTC CALCULATION (BAZETT): 460 MS
EKG QTC CALCULATION (BAZETT): 510 MS
EKG R AXIS: -50 DEGREES
EKG R AXIS: -60 DEGREES
EKG R AXIS: -61 DEGREES
EKG T AXIS: -14 DEGREES
EKG T AXIS: -2 DEGREES
EKG T AXIS: -21 DEGREES
EKG VENTRICULAR RATE: 82 BPM
EKG VENTRICULAR RATE: 84 BPM
EKG VENTRICULAR RATE: 88 BPM
ELECTROPHORESIS: ABNORMAL
EOSINOPHILS ABSOLUTE: 0 E9/L (ref 0.05–0.5)
EOSINOPHILS ABSOLUTE: 0.04 E9/L (ref 0.05–0.5)
EOSINOPHILS ABSOLUTE: 0.04 E9/L (ref 0.05–0.5)
EOSINOPHILS ABSOLUTE: 0.06 E9/L (ref 0.05–0.5)
EOSINOPHILS ABSOLUTE: 0.11 E9/L (ref 0.05–0.5)
EOSINOPHILS ABSOLUTE: 0.11 E9/L (ref 0.05–0.5)
EOSINOPHILS ABSOLUTE: 0.12 E9/L (ref 0.05–0.5)
EOSINOPHILS ABSOLUTE: 0.16 E9/L (ref 0.05–0.5)
EOSINOPHILS ABSOLUTE: 0.2 E9/L (ref 0.05–0.5)
EOSINOPHILS RELATIVE PERCENT: 0 % (ref 0–6)
EOSINOPHILS RELATIVE PERCENT: 0.4 % (ref 0–6)
EOSINOPHILS RELATIVE PERCENT: 0.4 % (ref 0–6)
EOSINOPHILS RELATIVE PERCENT: 0.7 % (ref 0–6)
EOSINOPHILS RELATIVE PERCENT: 0.9 % (ref 0–6)
EOSINOPHILS RELATIVE PERCENT: 1.3 % (ref 0–6)
EOSINOPHILS RELATIVE PERCENT: 1.4 % (ref 0–6)
EOSINOPHILS RELATIVE PERCENT: 1.5 % (ref 0–6)
EOSINOPHILS RELATIVE PERCENT: 1.6 % (ref 0–6)
EOSINOPHILS RELATIVE PERCENT: 1.6 % (ref 0–6)
EOSINOPHILS RELATIVE PERCENT: 1.7 % (ref 0–6)
FERRITIN: 31 NG/ML
FERRITIN: 91 NG/ML
FOLATE: 11.9 NG/ML (ref 4.8–24.2)
FOLATE: 13.2 NG/ML (ref 4.8–24.2)
GAMMA GLOBULIN: 0.7 G/DL (ref 0.7–1.6)
GFR AFRICAN AMERICAN: 22
GFR AFRICAN AMERICAN: 27
GFR AFRICAN AMERICAN: 34
GFR AFRICAN AMERICAN: 39
GFR AFRICAN AMERICAN: 42
GFR AFRICAN AMERICAN: 46
GFR NON-AFRICAN AMERICAN: 18 ML/MIN/1.73
GFR NON-AFRICAN AMERICAN: 22 ML/MIN/1.73
GFR NON-AFRICAN AMERICAN: 28 ML/MIN/1.73
GFR NON-AFRICAN AMERICAN: 32 ML/MIN/1.73
GFR NON-AFRICAN AMERICAN: 35 ML/MIN/1.73
GFR NON-AFRICAN AMERICAN: 38 ML/MIN/1.73
GLUCOSE BLD-MCNC: 107 MG/DL (ref 74–99)
GLUCOSE BLD-MCNC: 109 MG/DL (ref 74–99)
GLUCOSE BLD-MCNC: 137 MG/DL (ref 74–99)
GLUCOSE BLD-MCNC: 197 MG/DL (ref 74–99)
GLUCOSE BLD-MCNC: 241 MG/DL (ref 74–99)
GLUCOSE BLD-MCNC: 55 MG/DL (ref 74–99)
GLUCOSE BLD-MCNC: 77 MG/DL (ref 74–99)
GLUCOSE BLD-MCNC: 77 MG/DL (ref 74–99)
GLUCOSE BLD-MCNC: 82 MG/DL (ref 74–99)
GLUCOSE BLD-MCNC: 86 MG/DL (ref 74–99)
GLUCOSE BLD-MCNC: 86 MG/DL (ref 74–99)
GLUCOSE BLD-MCNC: 89 MG/DL (ref 74–99)
GLUCOSE BLD-MCNC: 90 MG/DL (ref 74–99)
GLUCOSE URINE: NEGATIVE MG/DL
HBA1C MFR BLD: 7.9 % (ref 4–5.6)
HCT VFR BLD CALC: 18.7 % (ref 34–48)
HCT VFR BLD CALC: 23.8 % (ref 34–48)
HCT VFR BLD CALC: 24.7 % (ref 34–48)
HCT VFR BLD CALC: 26.3 % (ref 34–48)
HCT VFR BLD CALC: 26.8 % (ref 34–48)
HCT VFR BLD CALC: 28 % (ref 34–48)
HCT VFR BLD CALC: 28.2 % (ref 34–48)
HCT VFR BLD CALC: 28.9 % (ref 34–48)
HCT VFR BLD CALC: 29.2 % (ref 34–48)
HCT VFR BLD CALC: 29.6 % (ref 34–48)
HCT VFR BLD CALC: 29.7 % (ref 34–48)
HCT VFR BLD CALC: 30.7 % (ref 34–48)
HCT VFR BLD CALC: 31 % (ref 34–48)
HCT VFR BLD CALC: 31.6 % (ref 34–48)
HCT VFR BLD CALC: 31.9 % (ref 34–48)
HCT VFR BLD CALC: 32.1 % (ref 34–48)
HCT VFR BLD CALC: 32.5 % (ref 34–48)
HCT VFR BLD CALC: 33.3 % (ref 34–48)
HEMOCCULT STL QL: NEGATIVE
HEMOGLOBIN: 10 G/DL (ref 11.5–15.5)
HEMOGLOBIN: 10.2 G/DL (ref 11.5–15.5)
HEMOGLOBIN: 10.4 G/DL (ref 11.5–15.5)
HEMOGLOBIN: 5.3 G/DL (ref 11.5–15.5)
HEMOGLOBIN: 7.4 G/DL (ref 11.5–15.5)
HEMOGLOBIN: 8.1 G/DL (ref 11.5–15.5)
HEMOGLOBIN: 8.3 G/DL (ref 11.5–15.5)
HEMOGLOBIN: 8.6 G/DL (ref 11.5–15.5)
HEMOGLOBIN: 8.7 G/DL (ref 11.5–15.5)
HEMOGLOBIN: 9.1 G/DL (ref 11.5–15.5)
HEMOGLOBIN: 9.2 G/DL (ref 11.5–15.5)
HEMOGLOBIN: 9.2 G/DL (ref 11.5–15.5)
HEMOGLOBIN: 9.3 G/DL (ref 11.5–15.5)
HEMOGLOBIN: 9.6 G/DL (ref 11.5–15.5)
HEMOGLOBIN: 9.6 G/DL (ref 11.5–15.5)
HEMOGLOBIN: 9.7 G/DL (ref 11.5–15.5)
HEMOGLOBIN: 9.7 G/DL (ref 11.5–15.5)
HYPOCHROMIA: ABNORMAL
IMMATURE GRANULOCYTES #: 0.04 E9/L
IMMATURE GRANULOCYTES #: 0.06 E9/L
IMMATURE GRANULOCYTES #: 0.06 E9/L
IMMATURE GRANULOCYTES #: 0.07 E9/L
IMMATURE GRANULOCYTES #: 0.07 E9/L
IMMATURE GRANULOCYTES #: 0.08 E9/L
IMMATURE GRANULOCYTES #: 0.08 E9/L
IMMATURE GRANULOCYTES #: 0.09 E9/L
IMMATURE GRANULOCYTES #: 0.1 E9/L
IMMATURE GRANULOCYTES #: 0.1 E9/L
IMMATURE GRANULOCYTES %: 0.5 % (ref 0–5)
IMMATURE GRANULOCYTES %: 0.6 % (ref 0–5)
IMMATURE GRANULOCYTES %: 0.7 % (ref 0–5)
IMMATURE GRANULOCYTES %: 0.7 % (ref 0–5)
IMMATURE GRANULOCYTES %: 0.8 % (ref 0–5)
IMMATURE GRANULOCYTES %: 0.9 % (ref 0–5)
IMMATURE GRANULOCYTES %: 1 % (ref 0–5)
IMMATURE GRANULOCYTES %: 1 % (ref 0–5)
IMMATURE RETIC FRACT: 7.8 % (ref 3–15.9)
INR BLD: 1
INR BLD: 1.1
INR BLD: 1.2
IRON SATURATION: 15 % (ref 15–50)
IRON SATURATION: 5 % (ref 15–50)
IRON: 19 MCG/DL (ref 37–145)
IRON: 52 MCG/DL (ref 37–145)
KETONES, URINE: NEGATIVE MG/DL
LEUKOCYTE ESTERASE, URINE: NEGATIVE
LYMPHOCYTES ABSOLUTE: 0.68 E9/L (ref 1.5–4)
LYMPHOCYTES ABSOLUTE: 0.89 E9/L (ref 1.5–4)
LYMPHOCYTES ABSOLUTE: 0.95 E9/L (ref 1.5–4)
LYMPHOCYTES ABSOLUTE: 1.04 E9/L (ref 1.5–4)
LYMPHOCYTES ABSOLUTE: 1.23 E9/L (ref 1.5–4)
LYMPHOCYTES ABSOLUTE: 1.26 E9/L (ref 1.5–4)
LYMPHOCYTES ABSOLUTE: 1.45 E9/L (ref 1.5–4)
LYMPHOCYTES ABSOLUTE: 1.61 E9/L (ref 1.5–4)
LYMPHOCYTES ABSOLUTE: 1.95 E9/L (ref 1.5–4)
LYMPHOCYTES ABSOLUTE: 1.99 E9/L (ref 1.5–4)
LYMPHOCYTES ABSOLUTE: 2.28 E9/L (ref 1.5–4)
LYMPHOCYTES RELATIVE PERCENT: 10.1 % (ref 20–42)
LYMPHOCYTES RELATIVE PERCENT: 10.5 % (ref 20–42)
LYMPHOCYTES RELATIVE PERCENT: 13.3 % (ref 20–42)
LYMPHOCYTES RELATIVE PERCENT: 14.1 % (ref 20–42)
LYMPHOCYTES RELATIVE PERCENT: 16.4 % (ref 20–42)
LYMPHOCYTES RELATIVE PERCENT: 18.9 % (ref 20–42)
LYMPHOCYTES RELATIVE PERCENT: 20.3 % (ref 20–42)
LYMPHOCYTES RELATIVE PERCENT: 23.8 % (ref 20–42)
LYMPHOCYTES RELATIVE PERCENT: 7 % (ref 20–42)
LYMPHOCYTES RELATIVE PERCENT: 9.3 % (ref 20–42)
LYMPHOCYTES RELATIVE PERCENT: 9.8 % (ref 20–42)
MAGNESIUM: 2.1 MG/DL (ref 1.6–2.6)
MAGNESIUM: 2.3 MG/DL (ref 1.6–2.6)
MCH RBC QN AUTO: 22.4 PG (ref 26–35)
MCH RBC QN AUTO: 24 PG (ref 26–35)
MCH RBC QN AUTO: 24.2 PG (ref 26–35)
MCH RBC QN AUTO: 24.6 PG (ref 26–35)
MCH RBC QN AUTO: 24.8 PG (ref 26–35)
MCH RBC QN AUTO: 24.8 PG (ref 26–35)
MCH RBC QN AUTO: 24.9 PG (ref 26–35)
MCH RBC QN AUTO: 24.9 PG (ref 26–35)
MCH RBC QN AUTO: 25.2 PG (ref 26–35)
MCH RBC QN AUTO: 25.3 PG (ref 26–35)
MCH RBC QN AUTO: 26.5 PG (ref 26–35)
MCH RBC QN AUTO: 27 PG (ref 26–35)
MCH RBC QN AUTO: 27.3 PG (ref 26–35)
MCH RBC QN AUTO: 27.5 PG (ref 26–35)
MCHC RBC AUTO-ENTMCNC: 28.3 % (ref 32–34.5)
MCHC RBC AUTO-ENTMCNC: 30 % (ref 32–34.5)
MCHC RBC AUTO-ENTMCNC: 30.2 % (ref 32–34.5)
MCHC RBC AUTO-ENTMCNC: 30.4 % (ref 32–34.5)
MCHC RBC AUTO-ENTMCNC: 30.4 % (ref 32–34.5)
MCHC RBC AUTO-ENTMCNC: 30.6 % (ref 32–34.5)
MCHC RBC AUTO-ENTMCNC: 31 % (ref 32–34.5)
MCHC RBC AUTO-ENTMCNC: 31 % (ref 32–34.5)
MCHC RBC AUTO-ENTMCNC: 31.1 % (ref 32–34.5)
MCHC RBC AUTO-ENTMCNC: 31.1 % (ref 32–34.5)
MCHC RBC AUTO-ENTMCNC: 31.5 % (ref 32–34.5)
MCHC RBC AUTO-ENTMCNC: 31.8 % (ref 32–34.5)
MCHC RBC AUTO-ENTMCNC: 32 % (ref 32–34.5)
MCHC RBC AUTO-ENTMCNC: 32.6 % (ref 32–34.5)
MCV RBC AUTO: 77.3 FL (ref 80–99.9)
MCV RBC AUTO: 77.9 FL (ref 80–99.9)
MCV RBC AUTO: 78.9 FL (ref 80–99.9)
MCV RBC AUTO: 79.1 FL (ref 80–99.9)
MCV RBC AUTO: 79.3 FL (ref 80–99.9)
MCV RBC AUTO: 79.3 FL (ref 80–99.9)
MCV RBC AUTO: 80.2 FL (ref 80–99.9)
MCV RBC AUTO: 80.2 FL (ref 80–99.9)
MCV RBC AUTO: 81.6 FL (ref 80–99.9)
MCV RBC AUTO: 81.7 FL (ref 80–99.9)
MCV RBC AUTO: 87 FL (ref 80–99.9)
MCV RBC AUTO: 87.3 FL (ref 80–99.9)
MCV RBC AUTO: 87.7 FL (ref 80–99.9)
MCV RBC AUTO: 87.8 FL (ref 80–99.9)
METER GLUCOSE: 101 MG/DL (ref 74–99)
METER GLUCOSE: 102 MG/DL (ref 74–99)
METER GLUCOSE: 103 MG/DL (ref 74–99)
METER GLUCOSE: 107 MG/DL (ref 74–99)
METER GLUCOSE: 107 MG/DL (ref 74–99)
METER GLUCOSE: 114 MG/DL (ref 74–99)
METER GLUCOSE: 118 MG/DL (ref 74–99)
METER GLUCOSE: 124 MG/DL (ref 74–99)
METER GLUCOSE: 124 MG/DL (ref 74–99)
METER GLUCOSE: 127 MG/DL (ref 74–99)
METER GLUCOSE: 136 MG/DL (ref 74–99)
METER GLUCOSE: 140 MG/DL (ref 74–99)
METER GLUCOSE: 142 MG/DL (ref 74–99)
METER GLUCOSE: 152 MG/DL (ref 74–99)
METER GLUCOSE: 153 MG/DL (ref 74–99)
METER GLUCOSE: 154 MG/DL (ref 74–99)
METER GLUCOSE: 157 MG/DL (ref 74–99)
METER GLUCOSE: 162 MG/DL (ref 74–99)
METER GLUCOSE: 163 MG/DL (ref 74–99)
METER GLUCOSE: 164 MG/DL (ref 74–99)
METER GLUCOSE: 167 MG/DL (ref 74–99)
METER GLUCOSE: 169 MG/DL (ref 74–99)
METER GLUCOSE: 172 MG/DL (ref 74–99)
METER GLUCOSE: 174 MG/DL (ref 74–99)
METER GLUCOSE: 185 MG/DL (ref 74–99)
METER GLUCOSE: 188 MG/DL (ref 74–99)
METER GLUCOSE: 190 MG/DL (ref 74–99)
METER GLUCOSE: 191 MG/DL (ref 74–99)
METER GLUCOSE: 203 MG/DL (ref 74–99)
METER GLUCOSE: 204 MG/DL (ref 74–99)
METER GLUCOSE: 205 MG/DL (ref 74–99)
METER GLUCOSE: 206 MG/DL (ref 74–99)
METER GLUCOSE: 207 MG/DL (ref 74–99)
METER GLUCOSE: 207 MG/DL (ref 74–99)
METER GLUCOSE: 211 MG/DL (ref 74–99)
METER GLUCOSE: 226 MG/DL (ref 74–99)
METER GLUCOSE: 232 MG/DL (ref 74–99)
METER GLUCOSE: 245 MG/DL (ref 74–99)
METER GLUCOSE: 280 MG/DL (ref 74–99)
METER GLUCOSE: 317 MG/DL (ref 74–99)
METER GLUCOSE: 53 MG/DL (ref 74–99)
METER GLUCOSE: 61 MG/DL (ref 74–99)
METER GLUCOSE: 65 MG/DL (ref 74–99)
METER GLUCOSE: 69 MG/DL (ref 74–99)
METER GLUCOSE: 81 MG/DL (ref 74–99)
METER GLUCOSE: 83 MG/DL (ref 74–99)
METER GLUCOSE: 86 MG/DL (ref 74–99)
METER GLUCOSE: 86 MG/DL (ref 74–99)
METER GLUCOSE: 88 MG/DL (ref 74–99)
METER GLUCOSE: 92 MG/DL (ref 74–99)
METER GLUCOSE: 94 MG/DL (ref 74–99)
METER GLUCOSE: 94 MG/DL (ref 74–99)
METER GLUCOSE: 95 MG/DL (ref 74–99)
METER GLUCOSE: 96 MG/DL (ref 74–99)
METER GLUCOSE: 97 MG/DL (ref 74–99)
METER GLUCOSE: 98 MG/DL (ref 74–99)
METER GLUCOSE: 98 MG/DL (ref 74–99)
METER GLUCOSE: 99 MG/DL (ref 74–99)
MONOCYTES ABSOLUTE: 0.39 E9/L (ref 0.1–0.95)
MONOCYTES ABSOLUTE: 0.63 E9/L (ref 0.1–0.95)
MONOCYTES ABSOLUTE: 0.64 E9/L (ref 0.1–0.95)
MONOCYTES ABSOLUTE: 0.68 E9/L (ref 0.1–0.95)
MONOCYTES ABSOLUTE: 0.71 E9/L (ref 0.1–0.95)
MONOCYTES ABSOLUTE: 0.81 E9/L (ref 0.1–0.95)
MONOCYTES ABSOLUTE: 0.83 E9/L (ref 0.1–0.95)
MONOCYTES ABSOLUTE: 0.89 E9/L (ref 0.1–0.95)
MONOCYTES ABSOLUTE: 0.89 E9/L (ref 0.1–0.95)
MONOCYTES ABSOLUTE: 0.96 E9/L (ref 0.1–0.95)
MONOCYTES ABSOLUTE: 1.11 E9/L (ref 0.1–0.95)
MONOCYTES RELATIVE PERCENT: 10.5 % (ref 2–12)
MONOCYTES RELATIVE PERCENT: 10.8 % (ref 2–12)
MONOCYTES RELATIVE PERCENT: 3.5 % (ref 2–12)
MONOCYTES RELATIVE PERCENT: 5.1 % (ref 2–12)
MONOCYTES RELATIVE PERCENT: 6.6 % (ref 2–12)
MONOCYTES RELATIVE PERCENT: 6.6 % (ref 2–12)
MONOCYTES RELATIVE PERCENT: 6.7 % (ref 2–12)
MONOCYTES RELATIVE PERCENT: 7.2 % (ref 2–12)
MONOCYTES RELATIVE PERCENT: 9.2 % (ref 2–12)
MONOCYTES RELATIVE PERCENT: 9.5 % (ref 2–12)
MONOCYTES RELATIVE PERCENT: 9.9 % (ref 2–12)
NEUTROPHILS ABSOLUTE: 10.4 E9/L (ref 1.8–7.3)
NEUTROPHILS ABSOLUTE: 6.25 E9/L (ref 1.8–7.3)
NEUTROPHILS ABSOLUTE: 6.45 E9/L (ref 1.8–7.3)
NEUTROPHILS ABSOLUTE: 6.45 E9/L (ref 1.8–7.3)
NEUTROPHILS ABSOLUTE: 6.57 E9/L (ref 1.8–7.3)
NEUTROPHILS ABSOLUTE: 6.85 E9/L (ref 1.8–7.3)
NEUTROPHILS ABSOLUTE: 7.44 E9/L (ref 1.8–7.3)
NEUTROPHILS ABSOLUTE: 7.68 E9/L (ref 1.8–7.3)
NEUTROPHILS ABSOLUTE: 8.63 E9/L (ref 1.8–7.3)
NEUTROPHILS ABSOLUTE: 8.9 E9/L (ref 1.8–7.3)
NEUTROPHILS ABSOLUTE: 9.42 E9/L (ref 1.8–7.3)
NEUTROPHILS RELATIVE PERCENT: 67.1 % (ref 43–80)
NEUTROPHILS RELATIVE PERCENT: 69.8 % (ref 43–80)
NEUTROPHILS RELATIVE PERCENT: 70.5 % (ref 43–80)
NEUTROPHILS RELATIVE PERCENT: 72 % (ref 43–80)
NEUTROPHILS RELATIVE PERCENT: 74 % (ref 43–80)
NEUTROPHILS RELATIVE PERCENT: 77.6 % (ref 43–80)
NEUTROPHILS RELATIVE PERCENT: 77.7 % (ref 43–80)
NEUTROPHILS RELATIVE PERCENT: 79.4 % (ref 43–80)
NEUTROPHILS RELATIVE PERCENT: 79.5 % (ref 43–80)
NEUTROPHILS RELATIVE PERCENT: 83.1 % (ref 43–80)
NEUTROPHILS RELATIVE PERCENT: 88.7 % (ref 43–80)
NITRITE, URINE: NEGATIVE
OVALOCYTES: ABNORMAL
PDW BLD-RTO: 18.1 FL (ref 11.5–15)
PDW BLD-RTO: 18.2 FL (ref 11.5–15)
PDW BLD-RTO: 18.4 FL (ref 11.5–15)
PDW BLD-RTO: 18.5 FL (ref 11.5–15)
PDW BLD-RTO: 19.6 FL (ref 11.5–15)
PDW BLD-RTO: 19.8 FL (ref 11.5–15)
PDW BLD-RTO: 19.8 FL (ref 11.5–15)
PDW BLD-RTO: 19.9 FL (ref 11.5–15)
PDW BLD-RTO: 20.5 FL (ref 11.5–15)
PDW BLD-RTO: 20.7 FL (ref 11.5–15)
PDW BLD-RTO: 20.9 FL (ref 11.5–15)
PDW BLD-RTO: 20.9 FL (ref 11.5–15)
PDW BLD-RTO: 21 FL (ref 11.5–15)
PDW BLD-RTO: 22.1 FL (ref 11.5–15)
PH UA: 5 (ref 5–9)
PLATELET # BLD: 247 E9/L (ref 130–450)
PLATELET # BLD: 253 E9/L (ref 130–450)
PLATELET # BLD: 260 E9/L (ref 130–450)
PLATELET # BLD: 267 E9/L (ref 130–450)
PLATELET # BLD: 268 E9/L (ref 130–450)
PLATELET # BLD: 270 E9/L (ref 130–450)
PLATELET # BLD: 271 E9/L (ref 130–450)
PLATELET # BLD: 274 E9/L (ref 130–450)
PLATELET # BLD: 274 E9/L (ref 130–450)
PLATELET # BLD: 278 E9/L (ref 130–450)
PLATELET # BLD: 281 E9/L (ref 130–450)
PLATELET # BLD: 283 E9/L (ref 130–450)
PLATELET # BLD: 300 E9/L (ref 130–450)
PLATELET # BLD: 317 E9/L (ref 130–450)
PMV BLD AUTO: 10.1 FL (ref 7–12)
PMV BLD AUTO: 10.3 FL (ref 7–12)
PMV BLD AUTO: 10.4 FL (ref 7–12)
PMV BLD AUTO: 10.4 FL (ref 7–12)
PMV BLD AUTO: 10.5 FL (ref 7–12)
PMV BLD AUTO: 10.8 FL (ref 7–12)
PMV BLD AUTO: 9.8 FL (ref 7–12)
PMV BLD AUTO: 9.9 FL (ref 7–12)
POIKILOCYTES: ABNORMAL
POLYCHROMASIA: ABNORMAL
POTASSIUM REFLEX MAGNESIUM: 3.4 MMOL/L (ref 3.5–5)
POTASSIUM REFLEX MAGNESIUM: 3.5 MMOL/L (ref 3.5–5)
POTASSIUM REFLEX MAGNESIUM: 3.7 MMOL/L (ref 3.5–5)
POTASSIUM REFLEX MAGNESIUM: 3.9 MMOL/L (ref 3.5–5)
POTASSIUM REFLEX MAGNESIUM: 4.2 MMOL/L (ref 3.5–5)
POTASSIUM REFLEX MAGNESIUM: 4.2 MMOL/L (ref 3.5–5)
POTASSIUM REFLEX MAGNESIUM: 4.4 MMOL/L (ref 3.5–5)
POTASSIUM REFLEX MAGNESIUM: 4.5 MMOL/L (ref 3.5–5)
POTASSIUM REFLEX MAGNESIUM: 4.7 MMOL/L (ref 3.5–5)
POTASSIUM SERPL-SCNC: 3.3 MMOL/L (ref 3.5–5)
POTASSIUM SERPL-SCNC: 3.6 MMOL/L (ref 3.5–5)
POTASSIUM SERPL-SCNC: 3.7 MMOL/L (ref 3.5–5)
POTASSIUM SERPL-SCNC: 4.1 MMOL/L (ref 3.5–5)
PRO-BNP: 1805 PG/ML (ref 0–450)
PRO-BNP: 2913 PG/ML (ref 0–450)
PRO-BNP: 754 PG/ML (ref 0–450)
PRO-BNP: ABNORMAL PG/ML (ref 0–450)
PROMYELOCYTES PERCENT: 0.9 % (ref 0–0)
PROTEIN UA: NEGATIVE MG/DL
PROTHROMBIN TIME: 11.4 SEC (ref 9.3–12.4)
PROTHROMBIN TIME: 12.2 SEC (ref 9.3–12.4)
PROTHROMBIN TIME: 13.4 SEC (ref 9.3–12.4)
RBC # BLD: 2.37 E12/L (ref 3.5–5.5)
RBC # BLD: 3.08 E12/L (ref 3.5–5.5)
RBC # BLD: 3.22 E12/L (ref 3.5–5.5)
RBC # BLD: 3.31 E12/L (ref 3.5–5.5)
RBC # BLD: 3.34 E12/L (ref 3.5–5.5)
RBC # BLD: 3.37 E12/L (ref 3.5–5.5)
RBC # BLD: 3.66 E12/L (ref 3.5–5.5)
RBC # BLD: 3.68 E12/L (ref 3.5–5.5)
RBC # BLD: 3.87 E12/L (ref 3.5–5.5)
RBC # BLD: 3.91 E12/L (ref 3.5–5.5)
RBC # BLD: 3.91 E12/L (ref 3.5–5.5)
RBC # BLD: 3.97 E12/L (ref 3.5–5.5)
RBC # BLD: 4.17 E12/L (ref 3.5–5.5)
RBC # BLD: 4.21 E12/L (ref 3.5–5.5)
RETIC HGB EQUIVALENT: 17.1 PG (ref 28.2–36.6)
RETICULOCYTE ABSOLUTE COUNT: 0.06 E12/L
RETICULOCYTE COUNT PCT: 2.1 % (ref 0.4–1.9)
SCHISTOCYTES: ABNORMAL
SCHISTOCYTES: ABNORMAL
SICKLE CELLS: ABNORMAL
SODIUM BLD-SCNC: 130 MMOL/L (ref 132–146)
SODIUM BLD-SCNC: 132 MMOL/L (ref 132–146)
SODIUM BLD-SCNC: 134 MMOL/L (ref 132–146)
SODIUM BLD-SCNC: 135 MMOL/L (ref 132–146)
SODIUM BLD-SCNC: 136 MMOL/L (ref 132–146)
SODIUM BLD-SCNC: 137 MMOL/L (ref 132–146)
SODIUM BLD-SCNC: 138 MMOL/L (ref 132–146)
SODIUM BLD-SCNC: 141 MMOL/L (ref 132–146)
SODIUM BLD-SCNC: 141 MMOL/L (ref 132–146)
SODIUM BLD-SCNC: 142 MMOL/L (ref 132–146)
SPECIFIC GRAVITY UA: 1.01 (ref 1–1.03)
TARGET CELLS: ABNORMAL
TEAR DROP CELLS: ABNORMAL
TEAR DROP CELLS: ABNORMAL
TOTAL IRON BINDING CAPACITY: 345 MCG/DL (ref 250–450)
TOTAL IRON BINDING CAPACITY: 369 MCG/DL (ref 250–450)
TOTAL PROTEIN: 5.8 G/DL (ref 6.4–8.3)
TOTAL PROTEIN: 5.9 G/DL (ref 6.4–8.3)
TOTAL PROTEIN: 6.1 G/DL (ref 6.4–8.3)
TOTAL PROTEIN: 6.1 G/DL (ref 6.4–8.3)
TOTAL PROTEIN: 6.4 G/DL (ref 6.4–8.3)
TOTAL PROTEIN: 6.4 G/DL (ref 6.4–8.3)
TOTAL PROTEIN: 6.5 G/DL (ref 6.4–8.3)
TOTAL PROTEIN: 7 G/DL (ref 6.4–8.3)
TOTAL PROTEIN: 7 G/DL (ref 6.4–8.3)
TOTAL PROTEIN: 7.2 G/DL (ref 6.4–8.3)
TROPONIN: 0.02 NG/ML (ref 0–0.03)
TROPONIN: 0.03 NG/ML (ref 0–0.03)
UROBILINOGEN, URINE: 0.2 E.U./DL
VITAMIN B-12: 1015 PG/ML (ref 211–946)
VITAMIN B-12: 482 PG/ML (ref 211–946)
WBC # BLD: 10.3 E9/L (ref 4.5–11.5)
WBC # BLD: 11.2 E9/L (ref 4.5–11.5)
WBC # BLD: 12.1 E9/L (ref 4.5–11.5)
WBC # BLD: 12.5 E9/L (ref 4.5–11.5)
WBC # BLD: 5.4 E9/L (ref 4.5–11.5)
WBC # BLD: 5.5 E9/L (ref 4.5–11.5)
WBC # BLD: 6.6 E9/L (ref 4.5–11.5)
WBC # BLD: 8.5 E9/L (ref 4.5–11.5)
WBC # BLD: 8.7 E9/L (ref 4.5–11.5)
WBC # BLD: 8.9 E9/L (ref 4.5–11.5)
WBC # BLD: 9.6 E9/L (ref 4.5–11.5)
WBC # BLD: 9.7 E9/L (ref 4.5–11.5)
WBC # BLD: 9.7 E9/L (ref 4.5–11.5)
WBC # BLD: 9.8 E9/L (ref 4.5–11.5)

## 2020-01-01 PROCEDURE — 1280000000 HC REHAB R&B

## 2020-01-01 PROCEDURE — 97110 THERAPEUTIC EXERCISES: CPT

## 2020-01-01 PROCEDURE — 97530 THERAPEUTIC ACTIVITIES: CPT

## 2020-01-01 PROCEDURE — 36415 COLL VENOUS BLD VENIPUNCTURE: CPT

## 2020-01-01 PROCEDURE — 84165 PROTEIN E-PHORESIS SERUM: CPT

## 2020-01-01 PROCEDURE — 1111F DSCHRG MED/CURRENT MED MERGE: CPT

## 2020-01-01 PROCEDURE — 85018 HEMOGLOBIN: CPT

## 2020-01-01 PROCEDURE — 6370000000 HC RX 637 (ALT 250 FOR IP): Performed by: PHYSICAL MEDICINE & REHABILITATION

## 2020-01-01 PROCEDURE — 73502 X-RAY EXAM HIP UNI 2-3 VIEWS: CPT

## 2020-01-01 PROCEDURE — 96374 THER/PROPH/DIAG INJ IV PUSH: CPT

## 2020-01-01 PROCEDURE — 2500000003 HC RX 250 WO HCPCS: Performed by: INTERNAL MEDICINE

## 2020-01-01 PROCEDURE — 82962 GLUCOSE BLOOD TEST: CPT

## 2020-01-01 PROCEDURE — 6360000002 HC RX W HCPCS: Performed by: INTERNAL MEDICINE

## 2020-01-01 PROCEDURE — 85025 COMPLETE CBC W/AUTO DIFF WBC: CPT

## 2020-01-01 PROCEDURE — 99285 EMERGENCY DEPT VISIT HI MDM: CPT

## 2020-01-01 PROCEDURE — 99238 HOSP IP/OBS DSCHRG MGMT 30/<: CPT | Performed by: PHYSICAL MEDICINE & REHABILITATION

## 2020-01-01 PROCEDURE — 2700000000 HC OXYGEN THERAPY PER DAY

## 2020-01-01 PROCEDURE — 2580000003 HC RX 258: Performed by: EMERGENCY MEDICINE

## 2020-01-01 PROCEDURE — 96375 TX/PRO/DX INJ NEW DRUG ADDON: CPT

## 2020-01-01 PROCEDURE — 97535 SELF CARE MNGMENT TRAINING: CPT

## 2020-01-01 PROCEDURE — 83540 ASSAY OF IRON: CPT

## 2020-01-01 PROCEDURE — 99232 SBSQ HOSP IP/OBS MODERATE 35: CPT | Performed by: SURGERY

## 2020-01-01 PROCEDURE — 2580000003 HC RX 258: Performed by: SURGERY

## 2020-01-01 PROCEDURE — 6370000000 HC RX 637 (ALT 250 FOR IP): Performed by: INTERNAL MEDICINE

## 2020-01-01 PROCEDURE — 6370000000 HC RX 637 (ALT 250 FOR IP): Performed by: STUDENT IN AN ORGANIZED HEALTH CARE EDUCATION/TRAINING PROGRAM

## 2020-01-01 PROCEDURE — 85027 COMPLETE CBC AUTOMATED: CPT

## 2020-01-01 PROCEDURE — 6360000002 HC RX W HCPCS: Performed by: EMERGENCY MEDICINE

## 2020-01-01 PROCEDURE — 80048 BASIC METABOLIC PNL TOTAL CA: CPT

## 2020-01-01 PROCEDURE — 80053 COMPREHEN METABOLIC PANEL: CPT

## 2020-01-01 PROCEDURE — 93005 ELECTROCARDIOGRAM TRACING: CPT | Performed by: EMERGENCY MEDICINE

## 2020-01-01 PROCEDURE — 96376 TX/PRO/DX INJ SAME DRUG ADON: CPT

## 2020-01-01 PROCEDURE — 86901 BLOOD TYPING SEROLOGIC RH(D): CPT

## 2020-01-01 PROCEDURE — 83550 IRON BINDING TEST: CPT

## 2020-01-01 PROCEDURE — 1200000000 HC SEMI PRIVATE

## 2020-01-01 PROCEDURE — 96372 THER/PROPH/DIAG INJ SC/IM: CPT

## 2020-01-01 PROCEDURE — 93010 ELECTROCARDIOGRAM REPORT: CPT | Performed by: INTERNAL MEDICINE

## 2020-01-01 PROCEDURE — 71045 X-RAY EXAM CHEST 1 VIEW: CPT

## 2020-01-01 PROCEDURE — G0378 HOSPITAL OBSERVATION PER HR: HCPCS

## 2020-01-01 PROCEDURE — 84484 ASSAY OF TROPONIN QUANT: CPT

## 2020-01-01 PROCEDURE — 85045 AUTOMATED RETICULOCYTE COUNT: CPT

## 2020-01-01 PROCEDURE — 97112 NEUROMUSCULAR REEDUCATION: CPT

## 2020-01-01 PROCEDURE — 97165 OT EVAL LOW COMPLEX 30 MIN: CPT

## 2020-01-01 PROCEDURE — 83880 ASSAY OF NATRIURETIC PEPTIDE: CPT

## 2020-01-01 PROCEDURE — 99223 1ST HOSP IP/OBS HIGH 75: CPT | Performed by: PHYSICAL MEDICINE & REHABILITATION

## 2020-01-01 PROCEDURE — 82746 ASSAY OF FOLIC ACID SERUM: CPT

## 2020-01-01 PROCEDURE — 86900 BLOOD TYPING SEROLOGIC ABO: CPT

## 2020-01-01 PROCEDURE — 82728 ASSAY OF FERRITIN: CPT

## 2020-01-01 PROCEDURE — 2060000000 HC ICU INTERMEDIATE R&B

## 2020-01-01 PROCEDURE — 97161 PT EVAL LOW COMPLEX 20 MIN: CPT

## 2020-01-01 PROCEDURE — 85610 PROTHROMBIN TIME: CPT

## 2020-01-01 PROCEDURE — 83735 ASSAY OF MAGNESIUM: CPT

## 2020-01-01 PROCEDURE — 2580000003 HC RX 258: Performed by: INTERNAL MEDICINE

## 2020-01-01 PROCEDURE — 97162 PT EVAL MOD COMPLEX 30 MIN: CPT

## 2020-01-01 PROCEDURE — 93970 EXTREMITY STUDY: CPT

## 2020-01-01 PROCEDURE — 36430 TRANSFUSION BLD/BLD COMPNT: CPT

## 2020-01-01 PROCEDURE — 85730 THROMBOPLASTIN TIME PARTIAL: CPT

## 2020-01-01 PROCEDURE — 99222 1ST HOSP IP/OBS MODERATE 55: CPT | Performed by: SURGERY

## 2020-01-01 PROCEDURE — 99232 SBSQ HOSP IP/OBS MODERATE 35: CPT | Performed by: PHYSICAL MEDICINE & REHABILITATION

## 2020-01-01 PROCEDURE — 73552 X-RAY EXAM OF FEMUR 2/>: CPT

## 2020-01-01 PROCEDURE — 87449 NOS EACH ORGANISM AG IA: CPT

## 2020-01-01 PROCEDURE — 99231 SBSQ HOSP IP/OBS SF/LOW 25: CPT | Performed by: PHYSICAL MEDICINE & REHABILITATION

## 2020-01-01 PROCEDURE — 99283 EMERGENCY DEPT VISIT LOW MDM: CPT

## 2020-01-01 PROCEDURE — 81003 URINALYSIS AUTO W/O SCOPE: CPT

## 2020-01-01 PROCEDURE — 83036 HEMOGLOBIN GLYCOSYLATED A1C: CPT

## 2020-01-01 PROCEDURE — 73030 X-RAY EXAM OF SHOULDER: CPT

## 2020-01-01 PROCEDURE — 82607 VITAMIN B-12: CPT

## 2020-01-01 PROCEDURE — 85014 HEMATOCRIT: CPT

## 2020-01-01 PROCEDURE — 97166 OT EVAL MOD COMPLEX 45 MIN: CPT

## 2020-01-01 PROCEDURE — 72125 CT NECK SPINE W/O DYE: CPT

## 2020-01-01 PROCEDURE — P9016 RBC LEUKOCYTES REDUCED: HCPCS

## 2020-01-01 PROCEDURE — 70450 CT HEAD/BRAIN W/O DYE: CPT

## 2020-01-01 PROCEDURE — 87324 CLOSTRIDIUM AG IA: CPT

## 2020-01-01 PROCEDURE — 93923 UPR/LXTR ART STDY 3+ LVLS: CPT

## 2020-01-01 PROCEDURE — 73620 X-RAY EXAM OF FOOT: CPT

## 2020-01-01 PROCEDURE — G0379 DIRECT REFER HOSPITAL OBSERV: HCPCS

## 2020-01-01 PROCEDURE — 93971 EXTREMITY STUDY: CPT

## 2020-01-01 PROCEDURE — 86923 COMPATIBILITY TEST ELECTRIC: CPT

## 2020-01-01 PROCEDURE — 1111F DSCHRG MED/CURRENT MED MERGE: CPT | Performed by: SURGERY

## 2020-01-01 PROCEDURE — 86850 RBC ANTIBODY SCREEN: CPT

## 2020-01-01 PROCEDURE — 99284 EMERGENCY DEPT VISIT MOD MDM: CPT

## 2020-01-01 RX ORDER — COLCHICINE 0.6 MG/1
0.6 TABLET ORAL DAILY
Status: DISCONTINUED | OUTPATIENT
Start: 2020-01-01 | End: 2020-01-01 | Stop reason: HOSPADM

## 2020-01-01 RX ORDER — ACETAMINOPHEN 325 MG/1
650 TABLET ORAL EVERY 6 HOURS PRN
Status: DISCONTINUED | OUTPATIENT
Start: 2020-01-01 | End: 2020-01-01 | Stop reason: HOSPADM

## 2020-01-01 RX ORDER — CALCIUM CARBONATE 200(500)MG
500 TABLET,CHEWABLE ORAL 2 TIMES DAILY PRN
Status: DISCONTINUED | OUTPATIENT
Start: 2020-01-01 | End: 2020-01-01 | Stop reason: HOSPADM

## 2020-01-01 RX ORDER — HYDROXYZINE PAMOATE 25 MG/1
25 CAPSULE ORAL EVERY 8 HOURS PRN
Status: DISCONTINUED | OUTPATIENT
Start: 2020-01-01 | End: 2020-01-01 | Stop reason: HOSPADM

## 2020-01-01 RX ORDER — PREDNISONE 1 MG/1
2.5 TABLET ORAL
Status: DISCONTINUED | OUTPATIENT
Start: 2020-01-01 | End: 2020-01-01

## 2020-01-01 RX ORDER — DEXTROSE MONOHYDRATE 25 G/50ML
12.5 INJECTION, SOLUTION INTRAVENOUS PRN
Status: DISCONTINUED | OUTPATIENT
Start: 2020-01-01 | End: 2020-01-01 | Stop reason: HOSPADM

## 2020-01-01 RX ORDER — FUROSEMIDE 10 MG/ML
20 INJECTION INTRAMUSCULAR; INTRAVENOUS ONCE
Status: COMPLETED | OUTPATIENT
Start: 2020-01-01 | End: 2020-01-01

## 2020-01-01 RX ORDER — DEXTROSE MONOHYDRATE 50 MG/ML
100 INJECTION, SOLUTION INTRAVENOUS PRN
Status: DISCONTINUED | OUTPATIENT
Start: 2020-01-01 | End: 2020-01-01 | Stop reason: HOSPADM

## 2020-01-01 RX ORDER — BUMETANIDE 1 MG/1
1 TABLET ORAL DAILY
Status: DISCONTINUED | OUTPATIENT
Start: 2020-01-01 | End: 2020-01-01 | Stop reason: HOSPADM

## 2020-01-01 RX ORDER — COLCHICINE 0.6 MG/1
0.6 TABLET ORAL DAILY PRN
Status: ON HOLD | COMMUNITY
End: 2021-01-01 | Stop reason: CLARIF

## 2020-01-01 RX ORDER — PANTOPRAZOLE SODIUM 40 MG/1
40 TABLET, DELAYED RELEASE ORAL
Status: DISCONTINUED | OUTPATIENT
Start: 2020-01-01 | End: 2020-01-01 | Stop reason: HOSPADM

## 2020-01-01 RX ORDER — 0.9 % SODIUM CHLORIDE 0.9 %
20 INTRAVENOUS SOLUTION INTRAVENOUS ONCE
Status: COMPLETED | OUTPATIENT
Start: 2020-01-01 | End: 2020-01-01

## 2020-01-01 RX ORDER — PREDNISONE 1 MG/1
2.5 TABLET ORAL EVERY OTHER DAY
COMMUNITY
End: 2020-01-01

## 2020-01-01 RX ORDER — TRAMADOL HYDROCHLORIDE 50 MG/1
50 TABLET ORAL EVERY 6 HOURS PRN
COMMUNITY
End: 2020-01-01 | Stop reason: ALTCHOICE

## 2020-01-01 RX ORDER — LEVOFLOXACIN 500 MG/1
250 TABLET, FILM COATED ORAL DAILY
Status: COMPLETED | OUTPATIENT
Start: 2020-01-01 | End: 2020-01-01

## 2020-01-01 RX ORDER — TRAMADOL HYDROCHLORIDE 50 MG/1
50 TABLET ORAL EVERY 6 HOURS PRN
Status: DISCONTINUED | OUTPATIENT
Start: 2020-01-01 | End: 2020-01-01 | Stop reason: HOSPADM

## 2020-01-01 RX ORDER — SODIUM CHLORIDE 0.9 % (FLUSH) 0.9 %
10 SYRINGE (ML) INJECTION EVERY 12 HOURS SCHEDULED
Status: DISCONTINUED | OUTPATIENT
Start: 2020-01-01 | End: 2020-01-01 | Stop reason: HOSPADM

## 2020-01-01 RX ORDER — GLIPIZIDE 5 MG/1
5 TABLET, FILM COATED, EXTENDED RELEASE ORAL DAILY
Status: ON HOLD | COMMUNITY
End: 2020-01-01

## 2020-01-01 RX ORDER — ONDANSETRON 2 MG/ML
4 INJECTION INTRAMUSCULAR; INTRAVENOUS ONCE
Status: COMPLETED | OUTPATIENT
Start: 2020-01-01 | End: 2020-01-01

## 2020-01-01 RX ORDER — GLIPIZIDE 5 MG/1
5 TABLET ORAL
Status: DISCONTINUED | OUTPATIENT
Start: 2020-01-01 | End: 2020-01-01 | Stop reason: HOSPADM

## 2020-01-01 RX ORDER — 0.9 % SODIUM CHLORIDE 0.9 %
20 INTRAVENOUS SOLUTION INTRAVENOUS ONCE
Status: DISCONTINUED | OUTPATIENT
Start: 2020-01-01 | End: 2020-01-01 | Stop reason: HOSPADM

## 2020-01-01 RX ORDER — CLOPIDOGREL BISULFATE 75 MG/1
75 TABLET ORAL DAILY
Status: DISCONTINUED | OUTPATIENT
Start: 2020-01-01 | End: 2020-01-01 | Stop reason: HOSPADM

## 2020-01-01 RX ORDER — CARVEDILOL 3.12 MG/1
3.12 TABLET ORAL 2 TIMES DAILY WITH MEALS
Status: DISCONTINUED | OUTPATIENT
Start: 2020-01-01 | End: 2020-01-01 | Stop reason: HOSPADM

## 2020-01-01 RX ORDER — PROMETHAZINE HYDROCHLORIDE 25 MG/1
12.5 TABLET ORAL EVERY 6 HOURS PRN
Status: DISCONTINUED | OUTPATIENT
Start: 2020-01-01 | End: 2020-01-01

## 2020-01-01 RX ORDER — METOCLOPRAMIDE 5 MG/1
5 TABLET ORAL
Status: DISCONTINUED | OUTPATIENT
Start: 2020-01-01 | End: 2020-01-01 | Stop reason: HOSPADM

## 2020-01-01 RX ORDER — PREDNISONE 1 MG/1
2.5 TABLET ORAL
Status: DISCONTINUED | OUTPATIENT
Start: 2020-01-01 | End: 2020-01-01 | Stop reason: HOSPADM

## 2020-01-01 RX ORDER — ASPIRIN 81 MG/1
81 TABLET ORAL DAILY
Status: ON HOLD | COMMUNITY
End: 2020-01-01 | Stop reason: HOSPADM

## 2020-01-01 RX ORDER — SODIUM CHLORIDE AND POTASSIUM CHLORIDE .9; .15 G/100ML; G/100ML
SOLUTION INTRAVENOUS CONTINUOUS
Status: DISCONTINUED | OUTPATIENT
Start: 2020-01-01 | End: 2020-01-01

## 2020-01-01 RX ORDER — DOCUSATE SODIUM 100 MG/1
100 CAPSULE, LIQUID FILLED ORAL 2 TIMES DAILY
Status: DISCONTINUED | OUTPATIENT
Start: 2020-01-01 | End: 2020-01-01 | Stop reason: HOSPADM

## 2020-01-01 RX ORDER — SODIUM CHLORIDE 0.9 % (FLUSH) 0.9 %
10 SYRINGE (ML) INJECTION PRN
Status: DISCONTINUED | OUTPATIENT
Start: 2020-01-01 | End: 2020-01-01 | Stop reason: HOSPADM

## 2020-01-01 RX ORDER — ALLOPURINOL 100 MG/1
100 TABLET ORAL 2 TIMES DAILY
Status: DISCONTINUED | OUTPATIENT
Start: 2020-01-01 | End: 2020-01-01 | Stop reason: HOSPADM

## 2020-01-01 RX ORDER — TRAMADOL HYDROCHLORIDE 50 MG/1
50 TABLET ORAL EVERY 6 HOURS PRN
Qty: 20 TABLET | Refills: 0 | Status: SHIPPED | OUTPATIENT
Start: 2020-01-01 | End: 2020-01-01

## 2020-01-01 RX ORDER — METOLAZONE 2.5 MG/1
2.5 TABLET ORAL
Status: DISCONTINUED | OUTPATIENT
Start: 2020-01-01 | End: 2020-01-01 | Stop reason: HOSPADM

## 2020-01-01 RX ORDER — PREDNISONE 1 MG/1
5 TABLET ORAL DAILY
Status: DISCONTINUED | OUTPATIENT
Start: 2020-01-01 | End: 2020-01-01 | Stop reason: HOSPADM

## 2020-01-01 RX ORDER — METOLAZONE 2.5 MG/1
2.5 TABLET ORAL
Qty: 30 TABLET | Refills: 3 | Status: ON HOLD | OUTPATIENT
Start: 2020-01-01 | End: 2020-01-01 | Stop reason: HOSPADM

## 2020-01-01 RX ORDER — ONDANSETRON 2 MG/ML
4 INJECTION INTRAMUSCULAR; INTRAVENOUS EVERY 6 HOURS PRN
Status: DISCONTINUED | OUTPATIENT
Start: 2020-01-01 | End: 2020-01-01

## 2020-01-01 RX ORDER — POLYETHYLENE GLYCOL 3350 17 G/17G
17 POWDER, FOR SOLUTION ORAL DAILY
Status: DISCONTINUED | OUTPATIENT
Start: 2020-01-01 | End: 2020-01-01 | Stop reason: HOSPADM

## 2020-01-01 RX ORDER — CARVEDILOL 6.25 MG/1
6.25 TABLET ORAL 2 TIMES DAILY WITH MEALS
Status: DISCONTINUED | OUTPATIENT
Start: 2020-01-01 | End: 2020-01-01 | Stop reason: HOSPADM

## 2020-01-01 RX ORDER — CLOPIDOGREL BISULFATE 75 MG/1
75 TABLET ORAL DAILY
COMMUNITY
End: 2020-01-01

## 2020-01-01 RX ORDER — PREDNISONE 2.5 MG
TABLET ORAL
Qty: 10 TABLET | Refills: 0 | Status: SHIPPED
Start: 2020-01-01

## 2020-01-01 RX ORDER — ALUMINUM HYDROXIDE AND MAGNESIUM CARBONATE 254; 237.5 MG/5ML; MG/5ML
10 LIQUID ORAL DAILY PRN
Status: ON HOLD | COMMUNITY
End: 2020-01-01

## 2020-01-01 RX ORDER — METOCLOPRAMIDE 5 MG/1
2.5 TABLET ORAL
Status: DISCONTINUED | OUTPATIENT
Start: 2020-01-01 | End: 2020-01-01 | Stop reason: HOSPADM

## 2020-01-01 RX ORDER — INSULIN GLARGINE 100 [IU]/ML
34 INJECTION, SOLUTION SUBCUTANEOUS 2 TIMES DAILY
Status: DISCONTINUED | OUTPATIENT
Start: 2020-01-01 | End: 2020-01-01

## 2020-01-01 RX ORDER — MORPHINE SULFATE 4 MG/ML
4 INJECTION, SOLUTION INTRAMUSCULAR; INTRAVENOUS ONCE
Status: COMPLETED | OUTPATIENT
Start: 2020-01-01 | End: 2020-01-01

## 2020-01-01 RX ORDER — ACETAMINOPHEN 325 MG/1
650 TABLET ORAL EVERY 4 HOURS PRN
Status: DISCONTINUED | OUTPATIENT
Start: 2020-01-01 | End: 2020-01-01 | Stop reason: HOSPADM

## 2020-01-01 RX ORDER — POTASSIUM CHLORIDE 20 MEQ/1
40 TABLET, EXTENDED RELEASE ORAL ONCE
Status: COMPLETED | OUTPATIENT
Start: 2020-01-01 | End: 2020-01-01

## 2020-01-01 RX ORDER — HYDROXYZINE HYDROCHLORIDE 10 MG/1
25 TABLET, FILM COATED ORAL EVERY 8 HOURS PRN
Status: DISCONTINUED | OUTPATIENT
Start: 2020-01-01 | End: 2020-01-01 | Stop reason: CLARIF

## 2020-01-01 RX ORDER — TRAMADOL HYDROCHLORIDE 50 MG/1
50 TABLET ORAL ONCE
Status: COMPLETED | OUTPATIENT
Start: 2020-01-01 | End: 2020-01-01

## 2020-01-01 RX ORDER — INSULIN GLARGINE 100 [IU]/ML
30 INJECTION, SOLUTION SUBCUTANEOUS 2 TIMES DAILY
Status: DISCONTINUED | OUTPATIENT
Start: 2020-01-01 | End: 2020-01-01 | Stop reason: HOSPADM

## 2020-01-01 RX ORDER — BUMETANIDE 0.25 MG/ML
1 INJECTION, SOLUTION INTRAMUSCULAR; INTRAVENOUS 2 TIMES DAILY
Status: DISCONTINUED | OUTPATIENT
Start: 2020-01-01 | End: 2020-01-01 | Stop reason: HOSPADM

## 2020-01-01 RX ORDER — ACETAMINOPHEN 650 MG/1
650 SUPPOSITORY RECTAL EVERY 6 HOURS PRN
Status: DISCONTINUED | OUTPATIENT
Start: 2020-01-01 | End: 2020-01-01 | Stop reason: HOSPADM

## 2020-01-01 RX ORDER — NICOTINE POLACRILEX 4 MG
15 LOZENGE BUCCAL PRN
Status: DISCONTINUED | OUTPATIENT
Start: 2020-01-01 | End: 2020-01-01 | Stop reason: HOSPADM

## 2020-01-01 RX ORDER — ASPIRIN 81 MG/1
81 TABLET, CHEWABLE ORAL DAILY
Qty: 30 TABLET | Refills: 0 | Status: SHIPPED | OUTPATIENT
Start: 2020-01-01 | End: 2020-01-01

## 2020-01-01 RX ORDER — HEPARIN SODIUM 10000 [USP'U]/ML
5000 INJECTION, SOLUTION INTRAVENOUS; SUBCUTANEOUS EVERY 8 HOURS SCHEDULED
Status: DISCONTINUED | OUTPATIENT
Start: 2020-01-01 | End: 2020-01-01 | Stop reason: HOSPADM

## 2020-01-01 RX ORDER — HYDRALAZINE HYDROCHLORIDE 25 MG/1
25 TABLET, FILM COATED ORAL EVERY 6 HOURS PRN
Status: DISCONTINUED | OUTPATIENT
Start: 2020-01-01 | End: 2020-01-01 | Stop reason: HOSPADM

## 2020-01-01 RX ORDER — QUETIAPINE FUMARATE 25 MG/1
50 TABLET, FILM COATED ORAL NIGHTLY
Qty: 30 TABLET | Refills: 0 | Status: SHIPPED | OUTPATIENT
Start: 2020-01-01 | End: 2020-01-01 | Stop reason: DRUGHIGH

## 2020-01-01 RX ORDER — PREDNISONE 1 MG/1
2.5 TABLET ORAL EVERY OTHER DAY
Status: DISCONTINUED | OUTPATIENT
Start: 2020-01-01 | End: 2020-01-01 | Stop reason: HOSPADM

## 2020-01-01 RX ORDER — QUETIAPINE FUMARATE 25 MG/1
75 TABLET, FILM COATED ORAL NIGHTLY
Status: DISCONTINUED | OUTPATIENT
Start: 2020-01-01 | End: 2020-01-01 | Stop reason: HOSPADM

## 2020-01-01 RX ORDER — ERGOCALCIFEROL 1.25 MG/1
50000 CAPSULE ORAL WEEKLY
Status: DISCONTINUED | OUTPATIENT
Start: 2020-01-01 | End: 2020-01-01 | Stop reason: HOSPADM

## 2020-01-01 RX ORDER — POLYETHYLENE GLYCOL 3350 17 G/17G
17 POWDER, FOR SOLUTION ORAL DAILY PRN
Status: DISCONTINUED | OUTPATIENT
Start: 2020-01-01 | End: 2020-01-01

## 2020-01-01 RX ORDER — SODIUM CHLORIDE 9 MG/ML
INJECTION, SOLUTION INTRAVENOUS CONTINUOUS
Status: DISCONTINUED | OUTPATIENT
Start: 2020-01-01 | End: 2020-01-01

## 2020-01-01 RX ORDER — ERGOCALCIFEROL 1.25 MG/1
50000 CAPSULE ORAL WEEKLY
COMMUNITY

## 2020-01-01 RX ORDER — AMOXICILLIN AND CLAVULANATE POTASSIUM 875; 125 MG/1; MG/1
1 TABLET, FILM COATED ORAL EVERY 12 HOURS SCHEDULED
Status: COMPLETED | OUTPATIENT
Start: 2020-01-01 | End: 2020-01-01

## 2020-01-01 RX ORDER — 0.9 % SODIUM CHLORIDE 0.9 %
1000 INTRAVENOUS SOLUTION INTRAVENOUS ONCE
Status: DISCONTINUED | OUTPATIENT
Start: 2020-01-01 | End: 2020-01-01

## 2020-01-01 RX ORDER — ACETAMINOPHEN 500 MG
500 TABLET ORAL EVERY 6 HOURS PRN
Status: DISCONTINUED | OUTPATIENT
Start: 2020-01-01 | End: 2020-01-01 | Stop reason: HOSPADM

## 2020-01-01 RX ORDER — METOCLOPRAMIDE 5 MG/1
5 TABLET ORAL
Status: DISCONTINUED | OUTPATIENT
Start: 2020-01-01 | End: 2020-01-01

## 2020-01-01 RX ORDER — ERGOCALCIFEROL 1.25 MG/1
50000 CAPSULE ORAL
Status: DISCONTINUED | OUTPATIENT
Start: 2020-01-01 | End: 2020-01-01 | Stop reason: HOSPADM

## 2020-01-01 RX ORDER — ALLOPURINOL 100 MG/1
100 TABLET ORAL DAILY
Status: DISCONTINUED | OUTPATIENT
Start: 2020-01-01 | End: 2020-01-01 | Stop reason: HOSPADM

## 2020-01-01 RX ORDER — QUETIAPINE FUMARATE 25 MG/1
50 TABLET, FILM COATED ORAL NIGHTLY
Qty: 30 TABLET | Refills: 0 | Status: SHIPPED | OUTPATIENT
Start: 2020-01-01 | End: 2020-01-01

## 2020-01-01 RX ORDER — SENNA PLUS 8.6 MG/1
1 TABLET ORAL NIGHTLY
Status: DISCONTINUED | OUTPATIENT
Start: 2020-01-01 | End: 2020-01-01 | Stop reason: HOSPADM

## 2020-01-01 RX ORDER — ACETAMINOPHEN 500 MG
1000 TABLET ORAL EVERY 6 HOURS PRN
COMMUNITY
End: 2020-01-01 | Stop reason: ALTCHOICE

## 2020-01-01 RX ORDER — CARVEDILOL 3.12 MG/1
3.12 TABLET ORAL 2 TIMES DAILY WITH MEALS
COMMUNITY

## 2020-01-01 RX ORDER — LACTULOSE 10 G/15ML
20 SOLUTION ORAL DAILY PRN
Status: DISCONTINUED | OUTPATIENT
Start: 2020-01-01 | End: 2020-01-01 | Stop reason: HOSPADM

## 2020-01-01 RX ORDER — ASPIRIN 81 MG/1
81 TABLET, CHEWABLE ORAL DAILY
Status: DISCONTINUED | OUTPATIENT
Start: 2020-01-01 | End: 2020-01-01 | Stop reason: HOSPADM

## 2020-01-01 RX ORDER — QUETIAPINE FUMARATE 25 MG/1
50 TABLET, FILM COATED ORAL NIGHTLY
Qty: 30 TABLET | Refills: 0 | Status: SHIPPED
Start: 2020-01-01 | End: 2020-01-01

## 2020-01-01 RX ORDER — SENNA PLUS 8.6 MG/1
1 TABLET ORAL DAILY PRN
Status: DISCONTINUED | OUTPATIENT
Start: 2020-01-01 | End: 2020-01-01 | Stop reason: HOSPADM

## 2020-01-01 RX ORDER — METOCLOPRAMIDE 5 MG/1
5 TABLET ORAL
Status: ON HOLD | COMMUNITY
End: 2021-01-01 | Stop reason: CLARIF

## 2020-01-01 RX ORDER — POTASSIUM CHLORIDE 20 MEQ/1
20 TABLET, EXTENDED RELEASE ORAL 2 TIMES DAILY WITH MEALS
Status: DISCONTINUED | OUTPATIENT
Start: 2020-01-01 | End: 2020-01-01 | Stop reason: HOSPADM

## 2020-01-01 RX ADMIN — BUMETANIDE 1 MG: 1 TABLET ORAL at 08:00

## 2020-01-01 RX ADMIN — TRAMADOL HYDROCHLORIDE 50 MG: 50 TABLET, FILM COATED ORAL at 22:34

## 2020-01-01 RX ADMIN — CARVEDILOL 6.25 MG: 6.25 TABLET, FILM COATED ORAL at 08:00

## 2020-01-01 RX ADMIN — ASPIRIN 81 MG 81 MG: 81 TABLET ORAL at 09:08

## 2020-01-01 RX ADMIN — SODIUM CHLORIDE 20 ML: 9 INJECTION, SOLUTION INTRAVENOUS at 07:31

## 2020-01-01 RX ADMIN — SACUBITRIL AND VALSARTAN 1 TABLET: 24; 26 TABLET, FILM COATED ORAL at 20:52

## 2020-01-01 RX ADMIN — SACUBITRIL AND VALSARTAN 1 TABLET: 24; 26 TABLET, FILM COATED ORAL at 21:49

## 2020-01-01 RX ADMIN — METOCLOPRAMIDE 2.5 MG: 5 TABLET ORAL at 11:36

## 2020-01-01 RX ADMIN — DOCUSATE SODIUM 100 MG: 100 CAPSULE, LIQUID FILLED ORAL at 08:31

## 2020-01-01 RX ADMIN — SACUBITRIL AND VALSARTAN 1 TABLET: 24; 26 TABLET, FILM COATED ORAL at 21:03

## 2020-01-01 RX ADMIN — TRAMADOL HYDROCHLORIDE 50 MG: 50 TABLET, FILM COATED ORAL at 10:57

## 2020-01-01 RX ADMIN — SENNOSIDES 8.6 MG: 8.6 TABLET, FILM COATED ORAL at 20:08

## 2020-01-01 RX ADMIN — POTASSIUM CHLORIDE 20 MEQ: 20 TABLET, EXTENDED RELEASE ORAL at 07:38

## 2020-01-01 RX ADMIN — ACETAMINOPHEN 650 MG: 325 TABLET ORAL at 21:15

## 2020-01-01 RX ADMIN — ACETAMINOPHEN 650 MG: 325 TABLET ORAL at 12:05

## 2020-01-01 RX ADMIN — POTASSIUM CHLORIDE 20 MEQ: 20 TABLET, EXTENDED RELEASE ORAL at 08:12

## 2020-01-01 RX ADMIN — GLIPIZIDE 5 MG: 5 TABLET ORAL at 06:07

## 2020-01-01 RX ADMIN — SACUBITRIL AND VALSARTAN 1 TABLET: 24; 26 TABLET, FILM COATED ORAL at 21:40

## 2020-01-01 RX ADMIN — TRAMADOL HYDROCHLORIDE 50 MG: 50 TABLET, FILM COATED ORAL at 08:41

## 2020-01-01 RX ADMIN — METOCLOPRAMIDE 2.5 MG: 5 TABLET ORAL at 10:56

## 2020-01-01 RX ADMIN — PREDNISONE 5 MG: 5 TABLET ORAL at 08:30

## 2020-01-01 RX ADMIN — ALLOPURINOL 100 MG: 100 TABLET ORAL at 08:13

## 2020-01-01 RX ADMIN — PREDNISONE 5 MG: 5 TABLET ORAL at 09:21

## 2020-01-01 RX ADMIN — DOCUSATE SODIUM 100 MG: 100 CAPSULE, LIQUID FILLED ORAL at 08:43

## 2020-01-01 RX ADMIN — CARVEDILOL 6.25 MG: 6.25 TABLET, FILM COATED ORAL at 16:19

## 2020-01-01 RX ADMIN — ALLOPURINOL 100 MG: 100 TABLET ORAL at 21:02

## 2020-01-01 RX ADMIN — POTASSIUM CHLORIDE 20 MEQ: 20 TABLET, EXTENDED RELEASE ORAL at 17:09

## 2020-01-01 RX ADMIN — POTASSIUM CHLORIDE 20 MEQ: 20 TABLET, EXTENDED RELEASE ORAL at 08:01

## 2020-01-01 RX ADMIN — CLOPIDOGREL 75 MG: 75 TABLET, FILM COATED ORAL at 08:24

## 2020-01-01 RX ADMIN — GLIPIZIDE 5 MG: 5 TABLET ORAL at 06:49

## 2020-01-01 RX ADMIN — BUMETANIDE 1 MG: 1 TABLET ORAL at 08:41

## 2020-01-01 RX ADMIN — INSULIN LISPRO 4 UNITS: 100 INJECTION, SOLUTION INTRAVENOUS; SUBCUTANEOUS at 12:40

## 2020-01-01 RX ADMIN — ACETAMINOPHEN 650 MG: 325 TABLET ORAL at 12:46

## 2020-01-01 RX ADMIN — CLOPIDOGREL BISULFATE 75 MG: 75 TABLET ORAL at 08:00

## 2020-01-01 RX ADMIN — POTASSIUM CHLORIDE 20 MEQ: 20 TABLET, EXTENDED RELEASE ORAL at 18:22

## 2020-01-01 RX ADMIN — METOCLOPRAMIDE 5 MG: 5 TABLET ORAL at 16:10

## 2020-01-01 RX ADMIN — DOCUSATE SODIUM 100 MG: 100 CAPSULE, LIQUID FILLED ORAL at 21:06

## 2020-01-01 RX ADMIN — METOCLOPRAMIDE 2.5 MG: 5 TABLET ORAL at 11:27

## 2020-01-01 RX ADMIN — METOCLOPRAMIDE 2.5 MG: 5 TABLET ORAL at 06:47

## 2020-01-01 RX ADMIN — TRAMADOL HYDROCHLORIDE 50 MG: 50 TABLET, FILM COATED ORAL at 21:46

## 2020-01-01 RX ADMIN — ASPIRIN 81 MG 81 MG: 81 TABLET ORAL at 09:25

## 2020-01-01 RX ADMIN — CARVEDILOL 6.25 MG: 6.25 TABLET, FILM COATED ORAL at 08:55

## 2020-01-01 RX ADMIN — HEPARIN SODIUM 5000 UNITS: 10000 INJECTION INTRAVENOUS; SUBCUTANEOUS at 20:55

## 2020-01-01 RX ADMIN — CLOPIDOGREL 75 MG: 75 TABLET, FILM COATED ORAL at 08:41

## 2020-01-01 RX ADMIN — METOCLOPRAMIDE 2.5 MG: 5 TABLET ORAL at 16:03

## 2020-01-01 RX ADMIN — PANTOPRAZOLE SODIUM 40 MG: 40 TABLET, DELAYED RELEASE ORAL at 06:43

## 2020-01-01 RX ADMIN — INSULIN LISPRO 2 UNITS: 100 INJECTION, SOLUTION INTRAVENOUS; SUBCUTANEOUS at 11:27

## 2020-01-01 RX ADMIN — BUMETANIDE 1 MG: 1 TABLET ORAL at 08:13

## 2020-01-01 RX ADMIN — ACETAMINOPHEN 650 MG: 325 TABLET ORAL at 13:55

## 2020-01-01 RX ADMIN — TRAMADOL HYDROCHLORIDE 50 MG: 50 TABLET, FILM COATED ORAL at 17:59

## 2020-01-01 RX ADMIN — ACETAMINOPHEN 650 MG: 325 TABLET ORAL at 18:52

## 2020-01-01 RX ADMIN — TRAMADOL HYDROCHLORIDE 50 MG: 50 TABLET, FILM COATED ORAL at 08:43

## 2020-01-01 RX ADMIN — PREDNISONE 5 MG: 5 TABLET ORAL at 08:13

## 2020-01-01 RX ADMIN — HYDROMORPHONE HYDROCHLORIDE 0.25 MG: 1 INJECTION, SOLUTION INTRAMUSCULAR; INTRAVENOUS; SUBCUTANEOUS at 01:41

## 2020-01-01 RX ADMIN — CLOPIDOGREL 75 MG: 75 TABLET, FILM COATED ORAL at 07:48

## 2020-01-01 RX ADMIN — EPOETIN ALFA-EPBX 20000 UNITS: 10000 INJECTION, SOLUTION INTRAVENOUS; SUBCUTANEOUS at 19:51

## 2020-01-01 RX ADMIN — GLIPIZIDE 5 MG: 5 TABLET ORAL at 06:18

## 2020-01-01 RX ADMIN — METOCLOPRAMIDE 5 MG: 5 TABLET ORAL at 11:32

## 2020-01-01 RX ADMIN — PANTOPRAZOLE SODIUM 40 MG: 40 TABLET, DELAYED RELEASE ORAL at 06:21

## 2020-01-01 RX ADMIN — BUMETANIDE 1 MG: 1 TABLET ORAL at 09:06

## 2020-01-01 RX ADMIN — ACETAMINOPHEN 650 MG: 325 TABLET ORAL at 16:26

## 2020-01-01 RX ADMIN — AMOXICILLIN AND CLAVULANATE POTASSIUM 1 TABLET: 875; 125 TABLET, FILM COATED ORAL at 08:15

## 2020-01-01 RX ADMIN — AMOXICILLIN AND CLAVULANATE POTASSIUM 1 TABLET: 875; 125 TABLET, FILM COATED ORAL at 08:25

## 2020-01-01 RX ADMIN — AMOXICILLIN AND CLAVULANATE POTASSIUM 1 TABLET: 875; 125 TABLET, FILM COATED ORAL at 21:06

## 2020-01-01 RX ADMIN — METOCLOPRAMIDE 2.5 MG: 5 TABLET ORAL at 16:49

## 2020-01-01 RX ADMIN — METOCLOPRAMIDE 2.5 MG: 5 TABLET ORAL at 06:38

## 2020-01-01 RX ADMIN — ERGOCALCIFEROL 50000 UNITS: 1.25 CAPSULE ORAL at 07:40

## 2020-01-01 RX ADMIN — CARVEDILOL 6.25 MG: 6.25 TABLET, FILM COATED ORAL at 15:33

## 2020-01-01 RX ADMIN — PREDNISONE 5 MG: 5 TABLET ORAL at 08:43

## 2020-01-01 RX ADMIN — ALLOPURINOL 100 MG: 100 TABLET ORAL at 09:34

## 2020-01-01 RX ADMIN — SACUBITRIL AND VALSARTAN 1 TABLET: 24; 26 TABLET, FILM COATED ORAL at 08:01

## 2020-01-01 RX ADMIN — COLCHICINE 0.6 MG: 0.6 TABLET ORAL at 07:39

## 2020-01-01 RX ADMIN — CARVEDILOL 6.25 MG: 6.25 TABLET, FILM COATED ORAL at 07:39

## 2020-01-01 RX ADMIN — CLOPIDOGREL 75 MG: 75 TABLET, FILM COATED ORAL at 07:39

## 2020-01-01 RX ADMIN — METOCLOPRAMIDE 2.5 MG: 5 TABLET ORAL at 15:51

## 2020-01-01 RX ADMIN — SACUBITRIL AND VALSARTAN 1 TABLET: 24; 26 TABLET, FILM COATED ORAL at 08:15

## 2020-01-01 RX ADMIN — METOCLOPRAMIDE 2.5 MG: 5 TABLET ORAL at 16:44

## 2020-01-01 RX ADMIN — CARVEDILOL 6.25 MG: 6.25 TABLET, FILM COATED ORAL at 18:22

## 2020-01-01 RX ADMIN — SACUBITRIL AND VALSARTAN 1 TABLET: 24; 26 TABLET, FILM COATED ORAL at 22:35

## 2020-01-01 RX ADMIN — GLIPIZIDE 5 MG: 5 TABLET ORAL at 06:00

## 2020-01-01 RX ADMIN — CARVEDILOL 6.25 MG: 6.25 TABLET, FILM COATED ORAL at 20:51

## 2020-01-01 RX ADMIN — ASPIRIN 81 MG 81 MG: 81 TABLET ORAL at 08:54

## 2020-01-01 RX ADMIN — SACUBITRIL AND VALSARTAN 1 TABLET: 24; 26 TABLET, FILM COATED ORAL at 11:43

## 2020-01-01 RX ADMIN — GLIPIZIDE 5 MG: 5 TABLET ORAL at 06:43

## 2020-01-01 RX ADMIN — SACUBITRIL AND VALSARTAN 1 TABLET: 24; 26 TABLET, FILM COATED ORAL at 07:40

## 2020-01-01 RX ADMIN — PANTOPRAZOLE SODIUM 40 MG: 40 TABLET, DELAYED RELEASE ORAL at 06:00

## 2020-01-01 RX ADMIN — ALLOPURINOL 100 MG: 100 TABLET ORAL at 08:26

## 2020-01-01 RX ADMIN — INSULIN LISPRO 2 UNITS: 100 INJECTION, SOLUTION INTRAVENOUS; SUBCUTANEOUS at 11:21

## 2020-01-01 RX ADMIN — METOCLOPRAMIDE 2.5 MG: 5 TABLET ORAL at 06:27

## 2020-01-01 RX ADMIN — INSULIN LISPRO 2 UNITS: 100 INJECTION, SOLUTION INTRAVENOUS; SUBCUTANEOUS at 16:22

## 2020-01-01 RX ADMIN — CLOPIDOGREL 75 MG: 75 TABLET, FILM COATED ORAL at 08:14

## 2020-01-01 RX ADMIN — DOCUSATE SODIUM 100 MG: 100 CAPSULE, LIQUID FILLED ORAL at 21:19

## 2020-01-01 RX ADMIN — DOCUSATE SODIUM 100 MG: 100 CAPSULE, LIQUID FILLED ORAL at 08:01

## 2020-01-01 RX ADMIN — ALLOPURINOL 100 MG: 100 TABLET ORAL at 08:01

## 2020-01-01 RX ADMIN — METOCLOPRAMIDE 2.5 MG: 5 TABLET ORAL at 06:22

## 2020-01-01 RX ADMIN — CARVEDILOL 6.25 MG: 6.25 TABLET, FILM COATED ORAL at 16:02

## 2020-01-01 RX ADMIN — SACUBITRIL AND VALSARTAN 1 TABLET: 24; 26 TABLET, FILM COATED ORAL at 08:53

## 2020-01-01 RX ADMIN — METOCLOPRAMIDE 2.5 MG: 5 TABLET ORAL at 11:38

## 2020-01-01 RX ADMIN — TRAMADOL HYDROCHLORIDE 50 MG: 50 TABLET, FILM COATED ORAL at 12:42

## 2020-01-01 RX ADMIN — INSULIN LISPRO 2 UNITS: 100 INJECTION, SOLUTION INTRAVENOUS; SUBCUTANEOUS at 20:56

## 2020-01-01 RX ADMIN — CARVEDILOL 6.25 MG: 6.25 TABLET, FILM COATED ORAL at 16:08

## 2020-01-01 RX ADMIN — CLOPIDOGREL 75 MG: 75 TABLET, FILM COATED ORAL at 08:54

## 2020-01-01 RX ADMIN — AMOXICILLIN AND CLAVULANATE POTASSIUM 1 TABLET: 875; 125 TABLET, FILM COATED ORAL at 20:41

## 2020-01-01 RX ADMIN — COLCHICINE 0.6 MG: 0.6 TABLET ORAL at 08:29

## 2020-01-01 RX ADMIN — METOCLOPRAMIDE 2.5 MG: 5 TABLET ORAL at 16:19

## 2020-01-01 RX ADMIN — ASPIRIN 81 MG 81 MG: 81 TABLET ORAL at 08:30

## 2020-01-01 RX ADMIN — CARVEDILOL 3.12 MG: 3.12 TABLET, FILM COATED ORAL at 16:22

## 2020-01-01 RX ADMIN — CLOPIDOGREL BISULFATE 75 MG: 75 TABLET ORAL at 09:42

## 2020-01-01 RX ADMIN — COLCHICINE 0.6 MG: 0.6 TABLET ORAL at 08:43

## 2020-01-01 RX ADMIN — GLIPIZIDE 5 MG: 5 TABLET ORAL at 06:08

## 2020-01-01 RX ADMIN — DOCUSATE SODIUM 100 MG: 100 CAPSULE, LIQUID FILLED ORAL at 07:38

## 2020-01-01 RX ADMIN — HEPARIN SODIUM 5000 UNITS: 10000 INJECTION INTRAVENOUS; SUBCUTANEOUS at 13:47

## 2020-01-01 RX ADMIN — PANTOPRAZOLE SODIUM 40 MG: 40 TABLET, DELAYED RELEASE ORAL at 06:08

## 2020-01-01 RX ADMIN — ACETAMINOPHEN 650 MG: 325 TABLET ORAL at 13:01

## 2020-01-01 RX ADMIN — HEPARIN SODIUM 5000 UNITS: 10000 INJECTION INTRAVENOUS; SUBCUTANEOUS at 22:04

## 2020-01-01 RX ADMIN — ACETAMINOPHEN 650 MG: 325 TABLET ORAL at 20:52

## 2020-01-01 RX ADMIN — CLOPIDOGREL 75 MG: 75 TABLET, FILM COATED ORAL at 09:09

## 2020-01-01 RX ADMIN — HEPARIN SODIUM 5000 UNITS: 10000 INJECTION INTRAVENOUS; SUBCUTANEOUS at 05:45

## 2020-01-01 RX ADMIN — TRAMADOL HYDROCHLORIDE 50 MG: 50 TABLET, FILM COATED ORAL at 02:00

## 2020-01-01 RX ADMIN — CLOPIDOGREL 75 MG: 75 TABLET, FILM COATED ORAL at 08:00

## 2020-01-01 RX ADMIN — TRAMADOL HYDROCHLORIDE 50 MG: 50 TABLET, FILM COATED ORAL at 11:38

## 2020-01-01 RX ADMIN — BUMETANIDE 1 MG: 1 TABLET ORAL at 08:14

## 2020-01-01 RX ADMIN — METOCLOPRAMIDE 2.5 MG: 5 TABLET ORAL at 09:27

## 2020-01-01 RX ADMIN — PREDNISONE: 5 TABLET ORAL at 09:34

## 2020-01-01 RX ADMIN — ONDANSETRON 4 MG: 2 INJECTION INTRAMUSCULAR; INTRAVENOUS at 21:48

## 2020-01-01 RX ADMIN — POTASSIUM CHLORIDE 20 MEQ: 20 TABLET, EXTENDED RELEASE ORAL at 08:27

## 2020-01-01 RX ADMIN — ALLOPURINOL 100 MG: 100 TABLET ORAL at 08:43

## 2020-01-01 RX ADMIN — CARVEDILOL 6.25 MG: 6.25 TABLET, FILM COATED ORAL at 11:44

## 2020-01-01 RX ADMIN — ALLOPURINOL 100 MG: 100 TABLET ORAL at 08:28

## 2020-01-01 RX ADMIN — ASPIRIN 81 MG 81 MG: 81 TABLET ORAL at 08:47

## 2020-01-01 RX ADMIN — BUMETANIDE 1 MG: 0.25 INJECTION INTRAMUSCULAR; INTRAVENOUS at 09:01

## 2020-01-01 RX ADMIN — SODIUM CHLORIDE: 9 INJECTION, SOLUTION INTRAVENOUS at 16:02

## 2020-01-01 RX ADMIN — METOCLOPRAMIDE 2.5 MG: 5 TABLET ORAL at 06:42

## 2020-01-01 RX ADMIN — GLIPIZIDE 5 MG: 5 TABLET ORAL at 07:30

## 2020-01-01 RX ADMIN — TRAMADOL HYDROCHLORIDE 50 MG: 50 TABLET, FILM COATED ORAL at 00:16

## 2020-01-01 RX ADMIN — CARVEDILOL 6.25 MG: 6.25 TABLET, FILM COATED ORAL at 09:06

## 2020-01-01 RX ADMIN — SACUBITRIL AND VALSARTAN 1 TABLET: 24; 26 TABLET, FILM COATED ORAL at 09:26

## 2020-01-01 RX ADMIN — DOCUSATE SODIUM 100 MG: 100 CAPSULE, LIQUID FILLED ORAL at 20:08

## 2020-01-01 RX ADMIN — ALLOPURINOL 100 MG: 100 TABLET ORAL at 20:56

## 2020-01-01 RX ADMIN — CLOPIDOGREL 75 MG: 75 TABLET, FILM COATED ORAL at 08:30

## 2020-01-01 RX ADMIN — ASPIRIN 81 MG 81 MG: 81 TABLET ORAL at 08:40

## 2020-01-01 RX ADMIN — PANTOPRAZOLE SODIUM 40 MG: 40 TABLET, DELAYED RELEASE ORAL at 06:07

## 2020-01-01 RX ADMIN — COLCHICINE 0.6 MG: 0.6 TABLET ORAL at 09:26

## 2020-01-01 RX ADMIN — POLYETHYLENE GLYCOL 3350 17 G: 17 POWDER, FOR SOLUTION ORAL at 09:21

## 2020-01-01 RX ADMIN — PANTOPRAZOLE SODIUM 40 MG: 40 TABLET, DELAYED RELEASE ORAL at 06:53

## 2020-01-01 RX ADMIN — POTASSIUM CHLORIDE 20 MEQ: 20 TABLET, EXTENDED RELEASE ORAL at 15:51

## 2020-01-01 RX ADMIN — SACUBITRIL AND VALSARTAN 1 TABLET: 24; 26 TABLET, FILM COATED ORAL at 08:13

## 2020-01-01 RX ADMIN — INSULIN LISPRO 4 UNITS: 100 INJECTION, SOLUTION INTRAVENOUS; SUBCUTANEOUS at 11:38

## 2020-01-01 RX ADMIN — Medication 10 ML: at 22:02

## 2020-01-01 RX ADMIN — INSULIN GLARGINE 34 UNITS: 100 INJECTION, SOLUTION SUBCUTANEOUS at 20:55

## 2020-01-01 RX ADMIN — TRAMADOL HYDROCHLORIDE 50 MG: 50 TABLET, FILM COATED ORAL at 21:07

## 2020-01-01 RX ADMIN — TRAMADOL HYDROCHLORIDE 50 MG: 50 TABLET, FILM COATED ORAL at 15:50

## 2020-01-01 RX ADMIN — DOCUSATE SODIUM 100 MG: 100 CAPSULE, LIQUID FILLED ORAL at 21:03

## 2020-01-01 RX ADMIN — DOCUSATE SODIUM 100 MG: 100 CAPSULE, LIQUID FILLED ORAL at 08:14

## 2020-01-01 RX ADMIN — ALLOPURINOL 100 MG: 100 TABLET ORAL at 07:48

## 2020-01-01 RX ADMIN — POTASSIUM CHLORIDE 20 MEQ: 20 TABLET, EXTENDED RELEASE ORAL at 16:02

## 2020-01-01 RX ADMIN — ALLOPURINOL 100 MG: 100 TABLET ORAL at 09:01

## 2020-01-01 RX ADMIN — ALLOPURINOL 100 MG: 100 TABLET ORAL at 09:42

## 2020-01-01 RX ADMIN — METOCLOPRAMIDE 2.5 MG: 5 TABLET ORAL at 11:30

## 2020-01-01 RX ADMIN — CARVEDILOL 6.25 MG: 6.25 TABLET, FILM COATED ORAL at 09:25

## 2020-01-01 RX ADMIN — QUETIAPINE FUMARATE 75 MG: 25 TABLET ORAL at 20:55

## 2020-01-01 RX ADMIN — POTASSIUM CHLORIDE 20 MEQ: 20 TABLET, EXTENDED RELEASE ORAL at 17:50

## 2020-01-01 RX ADMIN — HEPARIN SODIUM 5000 UNITS: 10000 INJECTION INTRAVENOUS; SUBCUTANEOUS at 21:47

## 2020-01-01 RX ADMIN — BUMETANIDE 1 MG: 0.25 INJECTION INTRAMUSCULAR; INTRAVENOUS at 09:37

## 2020-01-01 RX ADMIN — COLCHICINE 0.6 MG: 0.6 TABLET ORAL at 08:12

## 2020-01-01 RX ADMIN — METOCLOPRAMIDE 2.5 MG: 5 TABLET ORAL at 16:27

## 2020-01-01 RX ADMIN — POTASSIUM CHLORIDE 20 MEQ: 20 TABLET, EXTENDED RELEASE ORAL at 08:14

## 2020-01-01 RX ADMIN — COLCHICINE 0.6 MG: 0.6 TABLET ORAL at 09:06

## 2020-01-01 RX ADMIN — METOCLOPRAMIDE 2.5 MG: 5 TABLET ORAL at 07:20

## 2020-01-01 RX ADMIN — GLIPIZIDE 5 MG: 5 TABLET ORAL at 07:20

## 2020-01-01 RX ADMIN — METOCLOPRAMIDE 2.5 MG: 5 TABLET ORAL at 09:07

## 2020-01-01 RX ADMIN — METOCLOPRAMIDE 2.5 MG: 5 TABLET ORAL at 06:50

## 2020-01-01 RX ADMIN — BUMETANIDE 1 MG: 1 TABLET ORAL at 09:07

## 2020-01-01 RX ADMIN — TRAMADOL HYDROCHLORIDE 50 MG: 50 TABLET, FILM COATED ORAL at 09:09

## 2020-01-01 RX ADMIN — METOCLOPRAMIDE 2.5 MG: 5 TABLET ORAL at 10:57

## 2020-01-01 RX ADMIN — POTASSIUM CHLORIDE 20 MEQ: 20 TABLET, EXTENDED RELEASE ORAL at 08:52

## 2020-01-01 RX ADMIN — CARVEDILOL 6.25 MG: 6.25 TABLET, FILM COATED ORAL at 08:13

## 2020-01-01 RX ADMIN — ASPIRIN 81 MG 81 MG: 81 TABLET ORAL at 08:53

## 2020-01-01 RX ADMIN — CLOPIDOGREL 75 MG: 75 TABLET, FILM COATED ORAL at 09:26

## 2020-01-01 RX ADMIN — FUROSEMIDE 20 MG: 20 INJECTION, SOLUTION INTRAMUSCULAR; INTRAVENOUS at 04:31

## 2020-01-01 RX ADMIN — GLIPIZIDE 5 MG: 5 TABLET ORAL at 06:39

## 2020-01-01 RX ADMIN — DOCUSATE SODIUM 100 MG: 100 CAPSULE, LIQUID FILLED ORAL at 09:25

## 2020-01-01 RX ADMIN — METOCLOPRAMIDE 2.5 MG: 5 TABLET ORAL at 06:37

## 2020-01-01 RX ADMIN — LEVOFLOXACIN 250 MG: 500 TABLET, FILM COATED ORAL at 20:51

## 2020-01-01 RX ADMIN — PANTOPRAZOLE SODIUM 40 MG: 40 TABLET, DELAYED RELEASE ORAL at 06:48

## 2020-01-01 RX ADMIN — SACUBITRIL AND VALSARTAN 1 TABLET: 24; 26 TABLET, FILM COATED ORAL at 21:38

## 2020-01-01 RX ADMIN — AMOXICILLIN AND CLAVULANATE POTASSIUM 1 TABLET: 875; 125 TABLET, FILM COATED ORAL at 20:52

## 2020-01-01 RX ADMIN — COLCHICINE 0.6 MG: 0.6 TABLET ORAL at 08:13

## 2020-01-01 RX ADMIN — PETROLATUM: 420 OINTMENT TOPICAL at 11:25

## 2020-01-01 RX ADMIN — ALLOPURINOL 100 MG: 100 TABLET ORAL at 20:55

## 2020-01-01 RX ADMIN — PREDNISONE 5 MG: 5 TABLET ORAL at 08:51

## 2020-01-01 RX ADMIN — SENNOSIDES 8.6 MG: 8.6 TABLET, FILM COATED ORAL at 20:59

## 2020-01-01 RX ADMIN — ASPIRIN 81 MG 81 MG: 81 TABLET ORAL at 07:48

## 2020-01-01 RX ADMIN — BUMETANIDE 1 MG: 1 TABLET ORAL at 09:09

## 2020-01-01 RX ADMIN — PANTOPRAZOLE SODIUM 40 MG: 40 TABLET, DELAYED RELEASE ORAL at 06:22

## 2020-01-01 RX ADMIN — ALLOPURINOL 100 MG: 100 TABLET ORAL at 19:45

## 2020-01-01 RX ADMIN — POTASSIUM CHLORIDE 20 MEQ: 20 TABLET, EXTENDED RELEASE ORAL at 16:26

## 2020-01-01 RX ADMIN — BUMETANIDE 1 MG: 1 TABLET ORAL at 07:48

## 2020-01-01 RX ADMIN — TRAMADOL HYDROCHLORIDE 50 MG: 50 TABLET, FILM COATED ORAL at 21:48

## 2020-01-01 RX ADMIN — ALLOPURINOL 100 MG: 100 TABLET ORAL at 09:06

## 2020-01-01 RX ADMIN — PREDNISONE 5 MG: 5 TABLET ORAL at 07:39

## 2020-01-01 RX ADMIN — LEVOFLOXACIN 250 MG: 500 TABLET, FILM COATED ORAL at 08:13

## 2020-01-01 RX ADMIN — BUMETANIDE 1 MG: 0.25 INJECTION INTRAMUSCULAR; INTRAVENOUS at 08:53

## 2020-01-01 RX ADMIN — TRAMADOL HYDROCHLORIDE 50 MG: 50 TABLET, FILM COATED ORAL at 23:16

## 2020-01-01 RX ADMIN — SODIUM CHLORIDE: 9 INJECTION, SOLUTION INTRAVENOUS at 01:46

## 2020-01-01 RX ADMIN — GLIPIZIDE 5 MG: 5 TABLET ORAL at 06:21

## 2020-01-01 RX ADMIN — GLIPIZIDE 5 MG: 5 TABLET ORAL at 06:28

## 2020-01-01 RX ADMIN — INSULIN LISPRO 1 UNITS: 100 INJECTION, SOLUTION INTRAVENOUS; SUBCUTANEOUS at 20:20

## 2020-01-01 RX ADMIN — METOCLOPRAMIDE 2.5 MG: 5 TABLET ORAL at 06:39

## 2020-01-01 RX ADMIN — METOCLOPRAMIDE 2.5 MG: 5 TABLET ORAL at 10:39

## 2020-01-01 RX ADMIN — DOCUSATE SODIUM 100 MG: 100 CAPSULE, LIQUID FILLED ORAL at 09:08

## 2020-01-01 RX ADMIN — SACUBITRIL AND VALSARTAN 1 TABLET: 24; 26 TABLET, FILM COATED ORAL at 21:06

## 2020-01-01 RX ADMIN — ERGOCALCIFEROL 50000 UNITS: 1.25 CAPSULE ORAL at 09:09

## 2020-01-01 RX ADMIN — PANTOPRAZOLE SODIUM 40 MG: 40 TABLET, DELAYED RELEASE ORAL at 06:01

## 2020-01-01 RX ADMIN — TRAMADOL HYDROCHLORIDE 50 MG: 50 TABLET, FILM COATED ORAL at 05:37

## 2020-01-01 RX ADMIN — SACUBITRIL AND VALSARTAN 1 TABLET: 24; 26 TABLET, FILM COATED ORAL at 21:11

## 2020-01-01 RX ADMIN — GLIPIZIDE 5 MG: 5 TABLET ORAL at 06:53

## 2020-01-01 RX ADMIN — METOCLOPRAMIDE 2.5 MG: 5 TABLET ORAL at 11:39

## 2020-01-01 RX ADMIN — SACUBITRIL AND VALSARTAN 1 TABLET: 24; 26 TABLET, FILM COATED ORAL at 20:08

## 2020-01-01 RX ADMIN — POLYETHYLENE GLYCOL 3350 17 G: 17 POWDER, FOR SOLUTION ORAL at 07:38

## 2020-01-01 RX ADMIN — ALLOPURINOL 100 MG: 100 TABLET ORAL at 08:14

## 2020-01-01 RX ADMIN — Medication 10 ML: at 11:39

## 2020-01-01 RX ADMIN — SACUBITRIL AND VALSARTAN 1 TABLET: 24; 26 TABLET, FILM COATED ORAL at 08:31

## 2020-01-01 RX ADMIN — METOCLOPRAMIDE 5 MG: 5 TABLET ORAL at 06:24

## 2020-01-01 RX ADMIN — POTASSIUM CHLORIDE 20 MEQ: 20 TABLET, EXTENDED RELEASE ORAL at 08:54

## 2020-01-01 RX ADMIN — SENNOSIDES 8.6 MG: 8.6 TABLET, FILM COATED ORAL at 19:36

## 2020-01-01 RX ADMIN — Medication 10 ML: at 21:46

## 2020-01-01 RX ADMIN — QUETIAPINE FUMARATE 75 MG: 25 TABLET ORAL at 19:46

## 2020-01-01 RX ADMIN — PETROLATUM: 420 OINTMENT TOPICAL at 17:10

## 2020-01-01 RX ADMIN — SACUBITRIL AND VALSARTAN 1 TABLET: 24; 26 TABLET, FILM COATED ORAL at 20:59

## 2020-01-01 RX ADMIN — POTASSIUM CHLORIDE 20 MEQ: 20 TABLET, EXTENDED RELEASE ORAL at 08:40

## 2020-01-01 RX ADMIN — DOCUSATE SODIUM 100 MG: 100 CAPSULE, LIQUID FILLED ORAL at 21:49

## 2020-01-01 RX ADMIN — CARVEDILOL 6.25 MG: 6.25 TABLET, FILM COATED ORAL at 16:43

## 2020-01-01 RX ADMIN — INSULIN LISPRO 2 UNITS: 100 INJECTION, SOLUTION INTRAVENOUS; SUBCUTANEOUS at 19:45

## 2020-01-01 RX ADMIN — CARVEDILOL 6.25 MG: 6.25 TABLET, FILM COATED ORAL at 08:50

## 2020-01-01 RX ADMIN — BUMETANIDE 1 MG: 1 TABLET ORAL at 07:41

## 2020-01-01 RX ADMIN — SACUBITRIL AND VALSARTAN 1 TABLET: 24; 26 TABLET, FILM COATED ORAL at 08:54

## 2020-01-01 RX ADMIN — DOCUSATE SODIUM 100 MG: 100 CAPSULE, LIQUID FILLED ORAL at 21:11

## 2020-01-01 RX ADMIN — CARVEDILOL 6.25 MG: 6.25 TABLET, FILM COATED ORAL at 17:09

## 2020-01-01 RX ADMIN — SENNOSIDES 8.6 MG: 8.6 TABLET, FILM COATED ORAL at 21:06

## 2020-01-01 RX ADMIN — DOCUSATE SODIUM 100 MG: 100 CAPSULE, LIQUID FILLED ORAL at 22:34

## 2020-01-01 RX ADMIN — PANTOPRAZOLE SODIUM 40 MG: 40 TABLET, DELAYED RELEASE ORAL at 06:37

## 2020-01-01 RX ADMIN — GLIPIZIDE 5 MG: 5 TABLET ORAL at 06:22

## 2020-01-01 RX ADMIN — METOCLOPRAMIDE 2.5 MG: 5 TABLET ORAL at 06:21

## 2020-01-01 RX ADMIN — METOCLOPRAMIDE 2.5 MG: 5 TABLET ORAL at 16:07

## 2020-01-01 RX ADMIN — HEPARIN SODIUM 5000 UNITS: 10000 INJECTION INTRAVENOUS; SUBCUTANEOUS at 06:25

## 2020-01-01 RX ADMIN — TRAMADOL HYDROCHLORIDE 50 MG: 50 TABLET, FILM COATED ORAL at 16:09

## 2020-01-01 RX ADMIN — METOCLOPRAMIDE 2.5 MG: 5 TABLET ORAL at 17:50

## 2020-01-01 RX ADMIN — PREDNISONE 2.5 MG: 5 TABLET ORAL at 09:42

## 2020-01-01 RX ADMIN — DOCUSATE SODIUM 100 MG: 100 CAPSULE, LIQUID FILLED ORAL at 08:53

## 2020-01-01 RX ADMIN — CALCIUM CARBONATE (ANTACID) CHEW TAB 500 MG 500 MG: 500 CHEW TAB at 01:27

## 2020-01-01 RX ADMIN — ASPIRIN 81 MG 81 MG: 81 TABLET ORAL at 09:10

## 2020-01-01 RX ADMIN — POTASSIUM CHLORIDE 40 MEQ: 1500 TABLET, EXTENDED RELEASE ORAL at 12:35

## 2020-01-01 RX ADMIN — TRAMADOL HYDROCHLORIDE 50 MG: 50 TABLET, FILM COATED ORAL at 15:56

## 2020-01-01 RX ADMIN — POTASSIUM CHLORIDE 20 MEQ: 20 TABLET, EXTENDED RELEASE ORAL at 08:30

## 2020-01-01 RX ADMIN — SACUBITRIL AND VALSARTAN 1 TABLET: 24; 26 TABLET, FILM COATED ORAL at 21:02

## 2020-01-01 RX ADMIN — SENNOSIDES 8.6 MG: 8.6 TABLET, FILM COATED ORAL at 21:11

## 2020-01-01 RX ADMIN — POTASSIUM CHLORIDE AND SODIUM CHLORIDE: 900; 150 INJECTION, SOLUTION INTRAVENOUS at 06:40

## 2020-01-01 RX ADMIN — INSULIN LISPRO 2 UNITS: 100 INJECTION, SOLUTION INTRAVENOUS; SUBCUTANEOUS at 17:45

## 2020-01-01 RX ADMIN — PANTOPRAZOLE SODIUM 40 MG: 40 TABLET, DELAYED RELEASE ORAL at 06:28

## 2020-01-01 RX ADMIN — POTASSIUM CHLORIDE 20 MEQ: 20 TABLET, EXTENDED RELEASE ORAL at 09:06

## 2020-01-01 RX ADMIN — TRAMADOL HYDROCHLORIDE 50 MG: 50 TABLET, FILM COATED ORAL at 21:22

## 2020-01-01 RX ADMIN — COLCHICINE 0.6 MG: 0.6 TABLET ORAL at 09:07

## 2020-01-01 RX ADMIN — TRAMADOL HYDROCHLORIDE 50 MG: 50 TABLET, FILM COATED ORAL at 22:10

## 2020-01-01 RX ADMIN — PANTOPRAZOLE SODIUM 40 MG: 40 TABLET, DELAYED RELEASE ORAL at 06:29

## 2020-01-01 RX ADMIN — SACUBITRIL AND VALSARTAN 1 TABLET: 24; 26 TABLET, FILM COATED ORAL at 09:34

## 2020-01-01 RX ADMIN — CARVEDILOL 6.25 MG: 6.25 TABLET, FILM COATED ORAL at 16:50

## 2020-01-01 RX ADMIN — INSULIN GLARGINE 34 UNITS: 100 INJECTION, SOLUTION SUBCUTANEOUS at 08:14

## 2020-01-01 RX ADMIN — PREDNISONE 5 MG: 5 TABLET ORAL at 08:15

## 2020-01-01 RX ADMIN — COLCHICINE 0.6 MG: 0.6 TABLET ORAL at 08:52

## 2020-01-01 RX ADMIN — ASPIRIN 81 MG 81 MG: 81 TABLET ORAL at 08:12

## 2020-01-01 RX ADMIN — METOCLOPRAMIDE 2.5 MG: 5 TABLET ORAL at 16:26

## 2020-01-01 RX ADMIN — SODIUM CHLORIDE 20 ML: 9 INJECTION, SOLUTION INTRAVENOUS at 11:28

## 2020-01-01 RX ADMIN — CLOPIDOGREL 75 MG: 75 TABLET, FILM COATED ORAL at 08:13

## 2020-01-01 RX ADMIN — SACUBITRIL AND VALSARTAN 1 TABLET: 24; 26 TABLET, FILM COATED ORAL at 08:52

## 2020-01-01 RX ADMIN — METOCLOPRAMIDE 2.5 MG: 5 TABLET ORAL at 11:32

## 2020-01-01 RX ADMIN — SENNOSIDES 8.6 MG: 8.6 TABLET, FILM COATED ORAL at 21:40

## 2020-01-01 RX ADMIN — ALLOPURINOL 100 MG: 100 TABLET ORAL at 09:19

## 2020-01-01 RX ADMIN — SACUBITRIL AND VALSARTAN 1 TABLET: 24; 26 TABLET, FILM COATED ORAL at 09:07

## 2020-01-01 RX ADMIN — INSULIN LISPRO 2 UNITS: 100 INJECTION, SOLUTION INTRAVENOUS; SUBCUTANEOUS at 12:36

## 2020-01-01 RX ADMIN — MORPHINE SULFATE 4 MG: 4 INJECTION, SOLUTION INTRAMUSCULAR; INTRAVENOUS at 23:13

## 2020-01-01 RX ADMIN — TRAMADOL HYDROCHLORIDE 50 MG: 50 TABLET, FILM COATED ORAL at 11:32

## 2020-01-01 RX ADMIN — DOCUSATE SODIUM 100 MG: 100 CAPSULE, LIQUID FILLED ORAL at 21:40

## 2020-01-01 RX ADMIN — METOCLOPRAMIDE 2.5 MG: 5 TABLET ORAL at 16:02

## 2020-01-01 RX ADMIN — PANTOPRAZOLE SODIUM 40 MG: 40 TABLET, DELAYED RELEASE ORAL at 06:38

## 2020-01-01 RX ADMIN — SACUBITRIL AND VALSARTAN 1 TABLET: 24; 26 TABLET, FILM COATED ORAL at 20:41

## 2020-01-01 RX ADMIN — GLIPIZIDE 5 MG: 5 TABLET ORAL at 06:41

## 2020-01-01 RX ADMIN — SODIUM CHLORIDE: 9 INJECTION, SOLUTION INTRAVENOUS at 15:24

## 2020-01-01 RX ADMIN — ASPIRIN 81 MG 81 MG: 81 TABLET ORAL at 09:07

## 2020-01-01 RX ADMIN — DOCUSATE SODIUM 100 MG: 100 CAPSULE, LIQUID FILLED ORAL at 08:12

## 2020-01-01 RX ADMIN — SENNOSIDES 8.6 MG: 8.6 TABLET, FILM COATED ORAL at 21:39

## 2020-01-01 RX ADMIN — SACUBITRIL AND VALSARTAN 1 TABLET: 24; 26 TABLET, FILM COATED ORAL at 21:19

## 2020-01-01 RX ADMIN — POTASSIUM CHLORIDE 20 MEQ: 20 TABLET, EXTENDED RELEASE ORAL at 09:25

## 2020-01-01 RX ADMIN — CARVEDILOL 3.12 MG: 3.12 TABLET, FILM COATED ORAL at 09:42

## 2020-01-01 RX ADMIN — PANTOPRAZOLE SODIUM 40 MG: 40 TABLET, DELAYED RELEASE ORAL at 06:42

## 2020-01-01 RX ADMIN — GLIPIZIDE 5 MG: 5 TABLET ORAL at 06:37

## 2020-01-01 RX ADMIN — COLCHICINE 0.6 MG: 0.6 TABLET ORAL at 07:48

## 2020-01-01 RX ADMIN — INSULIN LISPRO 2 UNITS: 100 INJECTION, SOLUTION INTRAVENOUS; SUBCUTANEOUS at 16:00

## 2020-01-01 RX ADMIN — TRAMADOL HYDROCHLORIDE 50 MG: 50 TABLET, FILM COATED ORAL at 06:28

## 2020-01-01 RX ADMIN — Medication 10 ML: at 08:01

## 2020-01-01 RX ADMIN — SACUBITRIL AND VALSARTAN 1 TABLET: 24; 26 TABLET, FILM COATED ORAL at 09:00

## 2020-01-01 RX ADMIN — GLIPIZIDE 5 MG: 5 TABLET ORAL at 06:48

## 2020-01-01 RX ADMIN — CARVEDILOL 6.25 MG: 6.25 TABLET, FILM COATED ORAL at 16:01

## 2020-01-01 RX ADMIN — ACETAMINOPHEN 650 MG: 325 TABLET ORAL at 08:54

## 2020-01-01 RX ADMIN — SACUBITRIL AND VALSARTAN 1 TABLET: 24; 26 TABLET, FILM COATED ORAL at 08:43

## 2020-01-01 RX ADMIN — METOCLOPRAMIDE 2.5 MG: 5 TABLET ORAL at 15:33

## 2020-01-01 RX ADMIN — ACETAMINOPHEN 650 MG: 325 TABLET ORAL at 00:22

## 2020-01-01 RX ADMIN — PREDNISONE 5 MG: 5 TABLET ORAL at 09:07

## 2020-01-01 RX ADMIN — HEPARIN SODIUM 5000 UNITS: 10000 INJECTION INTRAVENOUS; SUBCUTANEOUS at 06:11

## 2020-01-01 RX ADMIN — LEVOFLOXACIN 250 MG: 500 TABLET, FILM COATED ORAL at 08:26

## 2020-01-01 RX ADMIN — TRAMADOL HYDROCHLORIDE 50 MG: 50 TABLET, FILM COATED ORAL at 11:30

## 2020-01-01 RX ADMIN — CLOPIDOGREL 75 MG: 75 TABLET, FILM COATED ORAL at 08:51

## 2020-01-01 RX ADMIN — SACUBITRIL AND VALSARTAN 1 TABLET: 24; 26 TABLET, FILM COATED ORAL at 11:16

## 2020-01-01 RX ADMIN — INSULIN LISPRO 2 UNITS: 100 INJECTION, SOLUTION INTRAVENOUS; SUBCUTANEOUS at 17:43

## 2020-01-01 RX ADMIN — METOCLOPRAMIDE 2.5 MG: 5 TABLET ORAL at 16:23

## 2020-01-01 RX ADMIN — ASPIRIN 81 MG 81 MG: 81 TABLET ORAL at 08:14

## 2020-01-01 RX ADMIN — TRAMADOL HYDROCHLORIDE 50 MG: 50 TABLET, FILM COATED ORAL at 14:07

## 2020-01-01 RX ADMIN — PREDNISONE 5 MG: 5 TABLET ORAL at 08:28

## 2020-01-01 RX ADMIN — CARVEDILOL 3.12 MG: 6.25 TABLET, FILM COATED ORAL at 17:48

## 2020-01-01 RX ADMIN — METOCLOPRAMIDE 2.5 MG: 5 TABLET ORAL at 06:18

## 2020-01-01 RX ADMIN — DOCUSATE SODIUM 100 MG: 100 CAPSULE, LIQUID FILLED ORAL at 19:36

## 2020-01-01 RX ADMIN — SACUBITRIL AND VALSARTAN 1 TABLET: 24; 26 TABLET, FILM COATED ORAL at 08:16

## 2020-01-01 RX ADMIN — METOCLOPRAMIDE 2.5 MG: 5 TABLET ORAL at 06:53

## 2020-01-01 RX ADMIN — BUMETANIDE 1 MG: 0.25 INJECTION INTRAMUSCULAR; INTRAVENOUS at 21:02

## 2020-01-01 RX ADMIN — BUMETANIDE 1 MG: 0.25 INJECTION INTRAMUSCULAR; INTRAVENOUS at 20:21

## 2020-01-01 RX ADMIN — SODIUM CHLORIDE: 9 INJECTION, SOLUTION INTRAVENOUS at 06:17

## 2020-01-01 RX ADMIN — ALLOPURINOL 100 MG: 100 TABLET ORAL at 08:15

## 2020-01-01 RX ADMIN — SACUBITRIL AND VALSARTAN 1 TABLET: 24; 26 TABLET, FILM COATED ORAL at 08:41

## 2020-01-01 RX ADMIN — BUMETANIDE 1 MG: 1 TABLET ORAL at 08:43

## 2020-01-01 RX ADMIN — PANTOPRAZOLE SODIUM 40 MG: 40 TABLET, DELAYED RELEASE ORAL at 06:18

## 2020-01-01 RX ADMIN — POTASSIUM CHLORIDE 20 MEQ: 20 TABLET, EXTENDED RELEASE ORAL at 15:54

## 2020-01-01 RX ADMIN — INSULIN LISPRO 2 UNITS: 100 INJECTION, SOLUTION INTRAVENOUS; SUBCUTANEOUS at 17:10

## 2020-01-01 RX ADMIN — CLOPIDOGREL 75 MG: 75 TABLET, FILM COATED ORAL at 09:07

## 2020-01-01 RX ADMIN — POTASSIUM CHLORIDE 20 MEQ: 20 TABLET, EXTENDED RELEASE ORAL at 16:19

## 2020-01-01 RX ADMIN — BUMETANIDE 1 MG: 1 TABLET ORAL at 11:16

## 2020-01-01 RX ADMIN — ASPIRIN 81 MG 81 MG: 81 TABLET ORAL at 08:01

## 2020-01-01 RX ADMIN — COLCHICINE 0.6 MG: 0.6 TABLET ORAL at 08:54

## 2020-01-01 RX ADMIN — METOCLOPRAMIDE 5 MG: 5 TABLET ORAL at 17:07

## 2020-01-01 RX ADMIN — BUMETANIDE 1 MG: 1 TABLET ORAL at 08:55

## 2020-01-01 RX ADMIN — METOCLOPRAMIDE 2.5 MG: 5 TABLET ORAL at 07:31

## 2020-01-01 RX ADMIN — DOCUSATE SODIUM 100 MG: 100 CAPSULE, LIQUID FILLED ORAL at 08:41

## 2020-01-01 RX ADMIN — SENNOSIDES 8.6 MG: 8.6 TABLET, FILM COATED ORAL at 20:52

## 2020-01-01 RX ADMIN — CARVEDILOL 3.12 MG: 6.25 TABLET, FILM COATED ORAL at 09:34

## 2020-01-01 RX ADMIN — PANTOPRAZOLE SODIUM 40 MG: 40 TABLET, DELAYED RELEASE ORAL at 07:30

## 2020-01-01 RX ADMIN — PREDNISONE 5 MG: 5 TABLET ORAL at 08:40

## 2020-01-01 RX ADMIN — PREDNISONE 2.5 MG: 5 TABLET ORAL at 08:15

## 2020-01-01 RX ADMIN — SENNOSIDES 8.6 MG: 8.6 TABLET, FILM COATED ORAL at 21:49

## 2020-01-01 RX ADMIN — HEPARIN SODIUM 5000 UNITS: 10000 INJECTION INTRAVENOUS; SUBCUTANEOUS at 17:09

## 2020-01-01 RX ADMIN — PREDNISONE 5 MG: 5 TABLET ORAL at 08:54

## 2020-01-01 RX ADMIN — POTASSIUM CHLORIDE 20 MEQ: 20 TABLET, EXTENDED RELEASE ORAL at 09:19

## 2020-01-01 RX ADMIN — SACUBITRIL AND VALSARTAN 1 TABLET: 24; 26 TABLET, FILM COATED ORAL at 20:20

## 2020-01-01 RX ADMIN — CARVEDILOL 6.25 MG: 6.25 TABLET, FILM COATED ORAL at 18:25

## 2020-01-01 RX ADMIN — INSULIN LISPRO 2 UNITS: 100 INJECTION, SOLUTION INTRAVENOUS; SUBCUTANEOUS at 20:42

## 2020-01-01 RX ADMIN — COLCHICINE 0.6 MG: 0.6 TABLET ORAL at 08:26

## 2020-01-01 RX ADMIN — PANTOPRAZOLE SODIUM 40 MG: 40 TABLET, DELAYED RELEASE ORAL at 06:39

## 2020-01-01 RX ADMIN — POTASSIUM CHLORIDE 20 MEQ: 20 TABLET, EXTENDED RELEASE ORAL at 20:51

## 2020-01-01 RX ADMIN — CARVEDILOL 3.12 MG: 6.25 TABLET, FILM COATED ORAL at 18:12

## 2020-01-01 RX ADMIN — ACETAMINOPHEN 650 MG: 325 TABLET ORAL at 09:07

## 2020-01-01 RX ADMIN — SACUBITRIL AND VALSARTAN 1 TABLET: 24; 26 TABLET, FILM COATED ORAL at 20:26

## 2020-01-01 RX ADMIN — CARVEDILOL 6.25 MG: 6.25 TABLET, FILM COATED ORAL at 08:25

## 2020-01-01 RX ADMIN — SENNOSIDES 8.6 MG: 8.6 TABLET, FILM COATED ORAL at 21:19

## 2020-01-01 RX ADMIN — SENNOSIDES 8.6 MG: 8.6 TABLET, FILM COATED ORAL at 21:03

## 2020-01-01 RX ADMIN — TRAMADOL HYDROCHLORIDE 50 MG: 50 TABLET, FILM COATED ORAL at 19:39

## 2020-01-01 RX ADMIN — POTASSIUM CHLORIDE 40 MEQ: 20 TABLET, EXTENDED RELEASE ORAL at 06:40

## 2020-01-01 RX ADMIN — GLIPIZIDE 5 MG: 5 TABLET ORAL at 06:38

## 2020-01-01 RX ADMIN — ASPIRIN 81 MG 81 MG: 81 TABLET ORAL at 08:24

## 2020-01-01 RX ADMIN — POTASSIUM CHLORIDE 20 MEQ: 20 TABLET, EXTENDED RELEASE ORAL at 16:49

## 2020-01-01 RX ADMIN — POTASSIUM CHLORIDE 20 MEQ: 20 TABLET, EXTENDED RELEASE ORAL at 09:08

## 2020-01-01 RX ADMIN — BUMETANIDE 1 MG: 1 TABLET ORAL at 08:01

## 2020-01-01 RX ADMIN — INSULIN GLARGINE 30 UNITS: 100 INJECTION, SOLUTION SUBCUTANEOUS at 20:57

## 2020-01-01 RX ADMIN — ALLOPURINOL 100 MG: 100 TABLET ORAL at 20:20

## 2020-01-01 RX ADMIN — POTASSIUM CHLORIDE 20 MEQ: 20 TABLET, EXTENDED RELEASE ORAL at 17:02

## 2020-01-01 RX ADMIN — ALLOPURINOL 100 MG: 100 TABLET ORAL at 08:53

## 2020-01-01 RX ADMIN — SENNOSIDES 8.6 MG: 8.6 TABLET, FILM COATED ORAL at 22:35

## 2020-01-01 RX ADMIN — POLYETHYLENE GLYCOL 3350 17 G: 17 POWDER, FOR SOLUTION ORAL at 09:00

## 2020-01-01 RX ADMIN — PREDNISONE 5 MG: 5 TABLET ORAL at 07:48

## 2020-01-01 RX ADMIN — TRAMADOL HYDROCHLORIDE 50 MG: 50 TABLET, FILM COATED ORAL at 06:46

## 2020-01-01 RX ADMIN — INSULIN GLARGINE 34 UNITS: 100 INJECTION, SOLUTION SUBCUTANEOUS at 19:45

## 2020-01-01 RX ADMIN — POTASSIUM CHLORIDE 20 MEQ: 20 TABLET, EXTENDED RELEASE ORAL at 15:53

## 2020-01-01 RX ADMIN — SENNOSIDES 8.6 MG: 8.6 TABLET, FILM COATED ORAL at 20:26

## 2020-01-01 RX ADMIN — SACUBITRIL AND VALSARTAN 1 TABLET: 24; 26 TABLET, FILM COATED ORAL at 07:48

## 2020-01-01 RX ADMIN — TRAMADOL HYDROCHLORIDE 50 MG: 50 TABLET, FILM COATED ORAL at 21:39

## 2020-01-01 RX ADMIN — ALLOPURINOL 100 MG: 100 TABLET ORAL at 08:02

## 2020-01-01 RX ADMIN — DOCUSATE SODIUM 100 MG: 100 CAPSULE, LIQUID FILLED ORAL at 20:59

## 2020-01-01 RX ADMIN — CARVEDILOL 6.25 MG: 6.25 TABLET, FILM COATED ORAL at 17:48

## 2020-01-01 RX ADMIN — ALLOPURINOL 100 MG: 100 TABLET ORAL at 20:28

## 2020-01-01 RX ADMIN — ACETAMINOPHEN 650 MG: 325 TABLET ORAL at 08:53

## 2020-01-01 RX ADMIN — METOCLOPRAMIDE 2.5 MG: 5 TABLET ORAL at 15:54

## 2020-01-01 RX ADMIN — QUETIAPINE FUMARATE 75 MG: 25 TABLET ORAL at 20:56

## 2020-01-01 RX ADMIN — DOCUSATE SODIUM 100 MG: 100 CAPSULE, LIQUID FILLED ORAL at 09:07

## 2020-01-01 RX ADMIN — SACUBITRIL AND VALSARTAN 1 TABLET: 24; 26 TABLET, FILM COATED ORAL at 08:00

## 2020-01-01 RX ADMIN — CARVEDILOL 6.25 MG: 6.25 TABLET, FILM COATED ORAL at 09:07

## 2020-01-01 RX ADMIN — CARVEDILOL 3.12 MG: 3.12 TABLET, FILM COATED ORAL at 11:32

## 2020-01-01 RX ADMIN — TRAMADOL HYDROCHLORIDE 50 MG: 50 TABLET, FILM COATED ORAL at 09:08

## 2020-01-01 RX ADMIN — INSULIN LISPRO 2 UNITS: 100 INJECTION, SOLUTION INTRAVENOUS; SUBCUTANEOUS at 11:10

## 2020-01-01 RX ADMIN — PREDNISONE 5 MG: 5 TABLET ORAL at 08:02

## 2020-01-01 RX ADMIN — DOCUSATE SODIUM 100 MG: 100 CAPSULE, LIQUID FILLED ORAL at 07:48

## 2020-01-01 RX ADMIN — ALLOPURINOL 100 MG: 100 TABLET ORAL at 09:07

## 2020-01-01 RX ADMIN — ALLOPURINOL 100 MG: 100 TABLET ORAL at 09:38

## 2020-01-01 RX ADMIN — DOCUSATE SODIUM 100 MG: 100 CAPSULE, LIQUID FILLED ORAL at 21:39

## 2020-01-01 RX ADMIN — ALLOPURINOL 100 MG: 100 TABLET ORAL at 08:41

## 2020-01-01 RX ADMIN — SODIUM CHLORIDE 125 MG: 9 INJECTION, SOLUTION INTRAVENOUS at 19:18

## 2020-01-01 RX ADMIN — HEPARIN SODIUM 5000 UNITS: 10000 INJECTION INTRAVENOUS; SUBCUTANEOUS at 05:58

## 2020-01-01 RX ADMIN — ALLOPURINOL 100 MG: 100 TABLET ORAL at 07:39

## 2020-01-01 RX ADMIN — TRAMADOL HYDROCHLORIDE 50 MG: 50 TABLET, FILM COATED ORAL at 07:52

## 2020-01-01 RX ADMIN — CARVEDILOL 6.25 MG: 6.25 TABLET, FILM COATED ORAL at 16:27

## 2020-01-01 RX ADMIN — CARVEDILOL 6.25 MG: 6.25 TABLET, FILM COATED ORAL at 15:53

## 2020-01-01 RX ADMIN — POTASSIUM CHLORIDE 20 MEQ: 20 TABLET, EXTENDED RELEASE ORAL at 16:43

## 2020-01-01 RX ADMIN — DOCUSATE SODIUM 100 MG: 100 CAPSULE, LIQUID FILLED ORAL at 09:33

## 2020-01-01 RX ADMIN — METOCLOPRAMIDE 5 MG: 5 TABLET ORAL at 11:38

## 2020-01-01 RX ADMIN — METOCLOPRAMIDE 2.5 MG: 5 TABLET ORAL at 15:50

## 2020-01-01 RX ADMIN — COLCHICINE 0.6 MG: 0.6 TABLET ORAL at 08:00

## 2020-01-01 RX ADMIN — ALLOPURINOL 100 MG: 100 TABLET ORAL at 11:32

## 2020-01-01 RX ADMIN — SACUBITRIL AND VALSARTAN 1 TABLET: 24; 26 TABLET, FILM COATED ORAL at 20:28

## 2020-01-01 RX ADMIN — CLOPIDOGREL BISULFATE 75 MG: 75 TABLET ORAL at 11:32

## 2020-01-01 RX ADMIN — ALLOPURINOL 100 MG: 100 TABLET ORAL at 08:54

## 2020-01-01 RX ADMIN — ALLOPURINOL 100 MG: 100 TABLET ORAL at 09:26

## 2020-01-01 RX ADMIN — CARVEDILOL 6.25 MG: 6.25 TABLET, FILM COATED ORAL at 07:48

## 2020-01-01 RX ADMIN — HEPARIN SODIUM 5000 UNITS: 10000 INJECTION INTRAVENOUS; SUBCUTANEOUS at 15:02

## 2020-01-01 RX ADMIN — DOCUSATE SODIUM 100 MG: 100 CAPSULE, LIQUID FILLED ORAL at 20:52

## 2020-01-01 RX ADMIN — SACUBITRIL AND VALSARTAN 1 TABLET: 24; 26 TABLET, FILM COATED ORAL at 19:12

## 2020-01-01 RX ADMIN — COLCHICINE 0.6 MG: 0.6 TABLET ORAL at 08:41

## 2020-01-01 RX ADMIN — CARVEDILOL 6.25 MG: 6.25 TABLET, FILM COATED ORAL at 08:48

## 2020-01-01 RX ADMIN — COLCHICINE 0.6 MG: 0.6 TABLET ORAL at 09:19

## 2020-01-01 RX ADMIN — METOCLOPRAMIDE 2.5 MG: 5 TABLET ORAL at 17:09

## 2020-01-01 RX ADMIN — BUMETANIDE 1 MG: 1 TABLET ORAL at 08:25

## 2020-01-01 RX ADMIN — METOCLOPRAMIDE 2.5 MG: 5 TABLET ORAL at 06:28

## 2020-01-01 RX ADMIN — GLIPIZIDE 5 MG: 5 TABLET ORAL at 06:01

## 2020-01-01 RX ADMIN — ERGOCALCIFEROL 50000 UNITS: 1.25 CAPSULE ORAL at 08:41

## 2020-01-01 RX ADMIN — SENNOSIDES 8.6 MG: 8.6 TABLET, FILM COATED ORAL at 20:41

## 2020-01-01 RX ADMIN — BUMETANIDE 1 MG: 0.25 INJECTION INTRAMUSCULAR; INTRAVENOUS at 20:28

## 2020-01-01 RX ADMIN — SACUBITRIL AND VALSARTAN 1 TABLET: 24; 26 TABLET, FILM COATED ORAL at 20:55

## 2020-01-01 RX ADMIN — DOCUSATE SODIUM 100 MG: 100 CAPSULE, LIQUID FILLED ORAL at 20:41

## 2020-01-01 RX ADMIN — POTASSIUM CHLORIDE 20 MEQ: 20 TABLET, EXTENDED RELEASE ORAL at 16:07

## 2020-01-01 RX ADMIN — CARVEDILOL 3.12 MG: 6.25 TABLET, FILM COATED ORAL at 09:00

## 2020-01-01 RX ADMIN — SACUBITRIL AND VALSARTAN 1 TABLET: 24; 26 TABLET, FILM COATED ORAL at 19:36

## 2020-01-01 RX ADMIN — CLOPIDOGREL BISULFATE 75 MG: 75 TABLET ORAL at 08:16

## 2020-01-01 RX ADMIN — CARVEDILOL 3.12 MG: 3.12 TABLET, FILM COATED ORAL at 16:08

## 2020-01-01 RX ADMIN — CARVEDILOL 6.25 MG: 6.25 TABLET, FILM COATED ORAL at 08:41

## 2020-01-01 RX ADMIN — BUMETANIDE 1 MG: 1 TABLET ORAL at 08:52

## 2020-01-01 RX ADMIN — POTASSIUM CHLORIDE 20 MEQ: 20 TABLET, EXTENDED RELEASE ORAL at 07:49

## 2020-01-01 RX ADMIN — CLOPIDOGREL 75 MG: 75 TABLET, FILM COATED ORAL at 08:43

## 2020-01-01 RX ADMIN — AMOXICILLIN AND CLAVULANATE POTASSIUM 1 TABLET: 875; 125 TABLET, FILM COATED ORAL at 08:54

## 2020-01-01 RX ADMIN — TRAMADOL HYDROCHLORIDE 50 MG: 50 TABLET, FILM COATED ORAL at 13:04

## 2020-01-01 RX ADMIN — CARVEDILOL 6.25 MG: 6.25 TABLET, FILM COATED ORAL at 08:29

## 2020-01-01 RX ADMIN — ASPIRIN 81 MG 81 MG: 81 TABLET ORAL at 07:38

## 2020-01-01 RX ADMIN — CARVEDILOL 3.12 MG: 6.25 TABLET, FILM COATED ORAL at 17:44

## 2020-01-01 RX ADMIN — METOCLOPRAMIDE 2.5 MG: 5 TABLET ORAL at 11:40

## 2020-01-01 RX ADMIN — TRAMADOL HYDROCHLORIDE 50 MG: 50 TABLET, FILM COATED ORAL at 15:57

## 2020-01-01 RX ADMIN — POTASSIUM CHLORIDE 20 MEQ: 20 TABLET, EXTENDED RELEASE ORAL at 08:43

## 2020-01-01 RX ADMIN — CARVEDILOL 6.25 MG: 6.25 TABLET, FILM COATED ORAL at 16:42

## 2020-01-01 RX ADMIN — MORPHINE SULFATE 4 MG: 4 INJECTION, SOLUTION INTRAMUSCULAR; INTRAVENOUS at 21:48

## 2020-01-01 RX ADMIN — TRAMADOL HYDROCHLORIDE 50 MG: 50 TABLET, FILM COATED ORAL at 15:13

## 2020-01-01 RX ADMIN — PANTOPRAZOLE SODIUM 40 MG: 40 TABLET, DELAYED RELEASE ORAL at 07:20

## 2020-01-01 RX ADMIN — INSULIN GLARGINE 30 UNITS: 100 INJECTION, SOLUTION SUBCUTANEOUS at 09:43

## 2020-01-01 RX ADMIN — METOCLOPRAMIDE: 5 TABLET ORAL at 11:43

## 2020-01-01 RX ADMIN — METOCLOPRAMIDE 5 MG: 5 TABLET ORAL at 06:11

## 2020-01-01 RX ADMIN — PREDNISONE 5 MG: 5 TABLET ORAL at 09:26

## 2020-01-01 RX ADMIN — BUMETANIDE 1 MG: 1 TABLET ORAL at 08:31

## 2020-01-01 RX ADMIN — PETROLATUM: 420 OINTMENT TOPICAL at 16:02

## 2020-01-01 RX ADMIN — POTASSIUM CHLORIDE 20 MEQ: 20 TABLET, EXTENDED RELEASE ORAL at 15:33

## 2020-01-01 RX ADMIN — PANTOPRAZOLE SODIUM 40 MG: 40 TABLET, DELAYED RELEASE ORAL at 06:50

## 2020-01-01 RX ADMIN — BUMETANIDE 1 MG: 1 TABLET ORAL at 09:27

## 2020-01-01 RX ADMIN — DOCUSATE SODIUM 100 MG: 100 CAPSULE, LIQUID FILLED ORAL at 20:26

## 2020-01-01 RX ADMIN — SACUBITRIL AND VALSARTAN 1 TABLET: 24; 26 TABLET, FILM COATED ORAL at 08:27

## 2020-01-01 RX ADMIN — METOCLOPRAMIDE 5 MG: 5 TABLET ORAL at 06:08

## 2020-01-01 RX ADMIN — TRAMADOL HYDROCHLORIDE 50 MG: 50 TABLET, FILM COATED ORAL at 11:43

## 2020-01-01 RX ADMIN — ACETAMINOPHEN 650 MG: 325 TABLET ORAL at 02:53

## 2020-01-01 RX ADMIN — METOCLOPRAMIDE 2.5 MG: 5 TABLET ORAL at 06:43

## 2020-01-01 RX ADMIN — INSULIN GLARGINE 34 UNITS: 100 INJECTION, SOLUTION SUBCUTANEOUS at 11:38

## 2020-01-01 RX ADMIN — HEPARIN SODIUM 5000 UNITS: 10000 INJECTION INTRAVENOUS; SUBCUTANEOUS at 15:58

## 2020-01-01 RX ADMIN — METOCLOPRAMIDE 5 MG: 5 TABLET ORAL at 20:52

## 2020-01-01 RX ADMIN — TRAMADOL HYDROCHLORIDE 50 MG: 50 TABLET, FILM COATED ORAL at 12:50

## 2020-01-01 RX ADMIN — TRAMADOL HYDROCHLORIDE 50 MG: 50 TABLET, FILM COATED ORAL at 04:10

## 2020-01-01 RX ADMIN — METOCLOPRAMIDE 2.5 MG: 5 TABLET ORAL at 11:00

## 2020-01-01 ASSESSMENT — PAIN DESCRIPTION - PAIN TYPE
TYPE: ACUTE PAIN
TYPE: SURGICAL PAIN
TYPE: ACUTE PAIN;SURGICAL PAIN
TYPE: ACUTE PAIN;SURGICAL PAIN
TYPE: SURGICAL PAIN
TYPE: SURGICAL PAIN
TYPE: ACUTE PAIN;SURGICAL PAIN
TYPE: ACUTE PAIN
TYPE: ACUTE PAIN
TYPE: ACUTE PAIN;SURGICAL PAIN
TYPE: CHRONIC PAIN
TYPE: CHRONIC PAIN
TYPE: SURGICAL PAIN
TYPE: SURGICAL PAIN
TYPE: ACUTE PAIN
TYPE: CHRONIC PAIN
TYPE: ACUTE PAIN;SURGICAL PAIN
TYPE: ACUTE PAIN
TYPE: SURGICAL PAIN
TYPE: SURGICAL PAIN
TYPE: ACUTE PAIN;SURGICAL PAIN
TYPE: ACUTE PAIN
TYPE: CHRONIC PAIN
TYPE: CHRONIC PAIN
TYPE: SURGICAL PAIN
TYPE: ACUTE PAIN
TYPE: SURGICAL PAIN
TYPE: ACUTE PAIN;SURGICAL PAIN
TYPE: SURGICAL PAIN
TYPE: ACUTE PAIN
TYPE: SURGICAL PAIN
TYPE: CHRONIC PAIN
TYPE: ACUTE PAIN;SURGICAL PAIN

## 2020-01-01 ASSESSMENT — PAIN DESCRIPTION - DESCRIPTORS
DESCRIPTORS: ACHING;SORE;DISCOMFORT
DESCRIPTORS: ACHING;DISCOMFORT
DESCRIPTORS: ACHING;DISCOMFORT
DESCRIPTORS: ACHING;DISCOMFORT;SORE
DESCRIPTORS: ACHING;DISCOMFORT;THROBBING
DESCRIPTORS: ACHING;DISCOMFORT;DULL
DESCRIPTORS: ACHING;DISCOMFORT
DESCRIPTORS: ACHING;DISCOMFORT;SORE
DESCRIPTORS: ACHING;CONSTANT;DISCOMFORT
DESCRIPTORS: ACHING;DISCOMFORT
DESCRIPTORS: ACHING;DISCOMFORT
DESCRIPTORS: ACHING;DISCOMFORT;DULL;SORE
DESCRIPTORS: ACHING;CONSTANT;DISCOMFORT
DESCRIPTORS: ACHING;DISCOMFORT
DESCRIPTORS: ACHING;DISCOMFORT;SORE
DESCRIPTORS: ACHING;DISCOMFORT;SORE
DESCRIPTORS: DISCOMFORT
DESCRIPTORS: ACHING;DISCOMFORT;SORE
DESCRIPTORS: ACHING;SHARP
DESCRIPTORS: CONSTANT;ACHING;DISCOMFORT;SORE
DESCRIPTORS: ACHING;DISCOMFORT;DULL
DESCRIPTORS: ACHING;DISCOMFORT
DESCRIPTORS: ACHING;THROBBING;SORE
DESCRIPTORS: ACHING;CONSTANT;DISCOMFORT
DESCRIPTORS: ACHING;CONSTANT;DISCOMFORT
DESCRIPTORS: ACHING;DISCOMFORT;SORE
DESCRIPTORS: DISCOMFORT
DESCRIPTORS: ACHING;DISCOMFORT
DESCRIPTORS: ACHING;DISCOMFORT;NAGGING
DESCRIPTORS: ACHING;DISCOMFORT
DESCRIPTORS: ACHING;DISCOMFORT
DESCRIPTORS: ACHING;DISCOMFORT;SORE
DESCRIPTORS: ACHING;DISCOMFORT

## 2020-01-01 ASSESSMENT — PAIN DESCRIPTION - PROGRESSION
CLINICAL_PROGRESSION: GRADUALLY IMPROVING
CLINICAL_PROGRESSION: NOT CHANGED
CLINICAL_PROGRESSION: GRADUALLY IMPROVING
CLINICAL_PROGRESSION: NOT CHANGED
CLINICAL_PROGRESSION: GRADUALLY IMPROVING
CLINICAL_PROGRESSION: NOT CHANGED
CLINICAL_PROGRESSION: GRADUALLY IMPROVING
CLINICAL_PROGRESSION: NOT CHANGED
CLINICAL_PROGRESSION: GRADUALLY WORSENING
CLINICAL_PROGRESSION: GRADUALLY IMPROVING
CLINICAL_PROGRESSION: NOT CHANGED
CLINICAL_PROGRESSION: NOT CHANGED
CLINICAL_PROGRESSION: GRADUALLY IMPROVING
CLINICAL_PROGRESSION: NOT CHANGED
CLINICAL_PROGRESSION: GRADUALLY WORSENING
CLINICAL_PROGRESSION: NOT CHANGED
CLINICAL_PROGRESSION: GRADUALLY IMPROVING
CLINICAL_PROGRESSION: GRADUALLY IMPROVING
CLINICAL_PROGRESSION: NOT CHANGED
CLINICAL_PROGRESSION: GRADUALLY IMPROVING
CLINICAL_PROGRESSION: NOT CHANGED
CLINICAL_PROGRESSION: GRADUALLY IMPROVING
CLINICAL_PROGRESSION: GRADUALLY IMPROVING
CLINICAL_PROGRESSION: NOT CHANGED
CLINICAL_PROGRESSION: NOT CHANGED

## 2020-01-01 ASSESSMENT — PAIN DESCRIPTION - ORIENTATION
ORIENTATION: LEFT
ORIENTATION: LEFT;UPPER
ORIENTATION: LEFT
ORIENTATION: LEFT;UPPER
ORIENTATION: LEFT
ORIENTATION: LOWER
ORIENTATION: LEFT

## 2020-01-01 ASSESSMENT — PAIN DESCRIPTION - ONSET
ONSET: ON-GOING
ONSET: GRADUAL
ONSET: ON-GOING
ONSET: ON-GOING
ONSET: GRADUAL
ONSET: ON-GOING
ONSET: GRADUAL
ONSET: ON-GOING

## 2020-01-01 ASSESSMENT — PAIN SCALES - GENERAL
PAINLEVEL_OUTOF10: 3
PAINLEVEL_OUTOF10: 0
PAINLEVEL_OUTOF10: 7
PAINLEVEL_OUTOF10: 0
PAINLEVEL_OUTOF10: 6
PAINLEVEL_OUTOF10: 8
PAINLEVEL_OUTOF10: 8
PAINLEVEL_OUTOF10: 0
PAINLEVEL_OUTOF10: 9
PAINLEVEL_OUTOF10: 5
PAINLEVEL_OUTOF10: 0
PAINLEVEL_OUTOF10: 0
PAINLEVEL_OUTOF10: 4
PAINLEVEL_OUTOF10: 8
PAINLEVEL_OUTOF10: 8
PAINLEVEL_OUTOF10: 0
PAINLEVEL_OUTOF10: 0
PAINLEVEL_OUTOF10: 3
PAINLEVEL_OUTOF10: 0
PAINLEVEL_OUTOF10: 10
PAINLEVEL_OUTOF10: 0
PAINLEVEL_OUTOF10: 0
PAINLEVEL_OUTOF10: 8
PAINLEVEL_OUTOF10: 5
PAINLEVEL_OUTOF10: 7
PAINLEVEL_OUTOF10: 0
PAINLEVEL_OUTOF10: 5
PAINLEVEL_OUTOF10: 6
PAINLEVEL_OUTOF10: 8
PAINLEVEL_OUTOF10: 8
PAINLEVEL_OUTOF10: 7
PAINLEVEL_OUTOF10: 0
PAINLEVEL_OUTOF10: 0
PAINLEVEL_OUTOF10: 2
PAINLEVEL_OUTOF10: 8
PAINLEVEL_OUTOF10: 7
PAINLEVEL_OUTOF10: 0
PAINLEVEL_OUTOF10: 5
PAINLEVEL_OUTOF10: 4
PAINLEVEL_OUTOF10: 0
PAINLEVEL_OUTOF10: 7
PAINLEVEL_OUTOF10: 4
PAINLEVEL_OUTOF10: 6
PAINLEVEL_OUTOF10: 8
PAINLEVEL_OUTOF10: 0
PAINLEVEL_OUTOF10: 0
PAINLEVEL_OUTOF10: 4
PAINLEVEL_OUTOF10: 10
PAINLEVEL_OUTOF10: 6
PAINLEVEL_OUTOF10: 7
PAINLEVEL_OUTOF10: 0
PAINLEVEL_OUTOF10: 0
PAINLEVEL_OUTOF10: 4
PAINLEVEL_OUTOF10: 0
PAINLEVEL_OUTOF10: 10
PAINLEVEL_OUTOF10: 6
PAINLEVEL_OUTOF10: 0
PAINLEVEL_OUTOF10: 0
PAINLEVEL_OUTOF10: 2
PAINLEVEL_OUTOF10: 0
PAINLEVEL_OUTOF10: 8
PAINLEVEL_OUTOF10: 8
PAINLEVEL_OUTOF10: 0
PAINLEVEL_OUTOF10: 8
PAINLEVEL_OUTOF10: 0
PAINLEVEL_OUTOF10: 9
PAINLEVEL_OUTOF10: 5
PAINLEVEL_OUTOF10: 5
PAINLEVEL_OUTOF10: 10
PAINLEVEL_OUTOF10: 2
PAINLEVEL_OUTOF10: 8
PAINLEVEL_OUTOF10: 8
PAINLEVEL_OUTOF10: 9
PAINLEVEL_OUTOF10: 0
PAINLEVEL_OUTOF10: 3
PAINLEVEL_OUTOF10: 10
PAINLEVEL_OUTOF10: 8
PAINLEVEL_OUTOF10: 0
PAINLEVEL_OUTOF10: 7
PAINLEVEL_OUTOF10: 0
PAINLEVEL_OUTOF10: 0
PAINLEVEL_OUTOF10: 2
PAINLEVEL_OUTOF10: 5
PAINLEVEL_OUTOF10: 0
PAINLEVEL_OUTOF10: 5
PAINLEVEL_OUTOF10: 8
PAINLEVEL_OUTOF10: 5
PAINLEVEL_OUTOF10: 7
PAINLEVEL_OUTOF10: 5
PAINLEVEL_OUTOF10: 6
PAINLEVEL_OUTOF10: 0
PAINLEVEL_OUTOF10: 7
PAINLEVEL_OUTOF10: 6
PAINLEVEL_OUTOF10: 3
PAINLEVEL_OUTOF10: 8
PAINLEVEL_OUTOF10: 0
PAINLEVEL_OUTOF10: 8
PAINLEVEL_OUTOF10: 10
PAINLEVEL_OUTOF10: 0
PAINLEVEL_OUTOF10: 2
PAINLEVEL_OUTOF10: 0
PAINLEVEL_OUTOF10: 3
PAINLEVEL_OUTOF10: 0
PAINLEVEL_OUTOF10: 6
PAINLEVEL_OUTOF10: 0
PAINLEVEL_OUTOF10: 0
PAINLEVEL_OUTOF10: 7
PAINLEVEL_OUTOF10: 10
PAINLEVEL_OUTOF10: 3
PAINLEVEL_OUTOF10: 8
PAINLEVEL_OUTOF10: 8
PAINLEVEL_OUTOF10: 1
PAINLEVEL_OUTOF10: 6
PAINLEVEL_OUTOF10: 0
PAINLEVEL_OUTOF10: 0
PAINLEVEL_OUTOF10: 3
PAINLEVEL_OUTOF10: 0
PAINLEVEL_OUTOF10: 0
PAINLEVEL_OUTOF10: 7
PAINLEVEL_OUTOF10: 8
PAINLEVEL_OUTOF10: 3
PAINLEVEL_OUTOF10: 10
PAINLEVEL_OUTOF10: 8
PAINLEVEL_OUTOF10: 5
PAINLEVEL_OUTOF10: 0
PAINLEVEL_OUTOF10: 3
PAINLEVEL_OUTOF10: 5
PAINLEVEL_OUTOF10: 8

## 2020-01-01 ASSESSMENT — PAIN - FUNCTIONAL ASSESSMENT
PAIN_FUNCTIONAL_ASSESSMENT: PREVENTS OR INTERFERES SOME ACTIVE ACTIVITIES AND ADLS
PAIN_FUNCTIONAL_ASSESSMENT: ACTIVITIES ARE NOT PREVENTED
PAIN_FUNCTIONAL_ASSESSMENT: PREVENTS OR INTERFERES SOME ACTIVE ACTIVITIES AND ADLS

## 2020-01-01 ASSESSMENT — PAIN DESCRIPTION - LOCATION
LOCATION: HIP
LOCATION: HIP;LEG
LOCATION: HIP
LOCATION: HIP
LOCATION: HIP;LEG
LOCATION: GENERALIZED
LOCATION: HIP
LOCATION: HIP
LOCATION: FOOT
LOCATION: HIP;LEG
LOCATION: HIP
LOCATION: HIP
LOCATION: HIP;LEG
LOCATION: HIP
LOCATION: HIP
LOCATION: HIP;LEG
LOCATION: FOOT
LOCATION: HIP
LOCATION: HIP
LOCATION: HIP;LEG
LOCATION: HIP
LOCATION: FOOT
LOCATION: HIP
LOCATION: HIP;LEG
LOCATION: HIP
LOCATION: HIP;LEG
LOCATION: HIP
LOCATION: BACK
LOCATION: HIP
LOCATION: HIP
LOCATION: GENERALIZED
LOCATION: HIP
LOCATION: HIP;LEG
LOCATION: HIP

## 2020-01-01 ASSESSMENT — PAIN DESCRIPTION - FREQUENCY
FREQUENCY: INTERMITTENT
FREQUENCY: INTERMITTENT
FREQUENCY: CONTINUOUS
FREQUENCY: CONTINUOUS
FREQUENCY: INTERMITTENT
FREQUENCY: INTERMITTENT
FREQUENCY: CONTINUOUS
FREQUENCY: INTERMITTENT
FREQUENCY: CONTINUOUS
FREQUENCY: INTERMITTENT
FREQUENCY: CONTINUOUS
FREQUENCY: CONTINUOUS
FREQUENCY: INTERMITTENT
FREQUENCY: CONTINUOUS
FREQUENCY: INTERMITTENT
FREQUENCY: CONTINUOUS
FREQUENCY: INTERMITTENT
FREQUENCY: CONTINUOUS
FREQUENCY: INTERMITTENT

## 2020-06-04 NOTE — ED PROVIDER NOTES
Department of Emergency Medicine   ED  Provider Note  Admit Date/RoomTime: 6/3/2020  9:16 PM  ED Room: 14/14          History of Present Illness:  6/3/20, Time: 11:37 PM EDT  Chief Complaint   Patient presents with    Fall     fixing the rug in front of the shower and fell into tub. c/o left hip pain                Nikolai Allen is a 80 y.o. female presenting to the ED for left hip pain after a fall at home, beginning 1 hour. The complaint has been persistent, moderate in severity, and worsened by movement and palpation of the area. Patient presents via EMS for left hip pain after sustaining a mechanical fall at home. She was preparing to take a shower, she was laying down a rug in from the shower when she lost her balance fell forward did strike the back of her head and fell on her left hip. Denies loss of consciousness. Her son is a physician did call in advance of this patient. There was high concern for hip fracture so she was brought in via EMS. She denies chest pain shortness of breath numbness tingling weakness, does complain of persistent left hip pain,    Review of Systems:   Pertinent positives and negatives are stated within HPI, all other systems reviewed and are negative.        --------------------------------------------- PAST HISTORY ---------------------------------------------  Past Medical History:  has a past medical history of Arthritis, CHF (congestive heart failure) ([facility] CKD (chronic kidney disease), Diabetic eye exam ([facility] [facility] Hyperlipidemia, Hypertension, Iron deficiency anemia, Type II or unspecified type diabetes mellitus without mention of complication, not stated as uncontrolled, Ulcer of foot ([address] and Valvular heart disease. Past Surgical History:  has a past surgical history that includes Appendectomy; Cholecystectomy; Carpal tunnel release; Cataract removal; Rotator cuff repair; Tonsillectomy; vascular surgery (4/18/2013);  Upper gastrointestinal endoscopy --------------------------------- IMPRESSION AND DISPOSITION ---------------------------------    IMPRESSION  1. Closed comminuted intertrochanteric fracture of left femur, [facility]    2. Left hip pain    3. Fall at home, initial encounter        DISPOSITION  Disposition: Transfer to [facility] to IM/Ortho  Patient condition is stable        NOTE: This report was transcribed using voice recognition software.  Every effort was made to ensure accuracy; however, inadvertent computerized transcription errors may be present       Mckenna Cody, DO  06/03/20 [address] [address] Gloria Workman, DO  06/04/20 0929

## 2020-06-16 PROBLEM — S72.002A CLOSED LEFT HIP FRACTURE, INITIAL ENCOUNTER (HCC): Status: ACTIVE | Noted: 2020-01-01

## 2020-06-16 NOTE — LETTER
PORTABLE PATIENT PROFILE  Shikha Xavier  [id number]    MEDICAL DIAGNOSIS/CONDITION:   Patient Active Problem List   Diagnosis    PVD (peripheral vascular disease) with claudication ([facility])    Atherosclerosis of native arteries of the extremities with ulceration ([facility]    Ulcer of foot ([facility]    Abnormal nuclear stress test    [facility]    VHD (valvular heart disease)    Severe mitral regurgitation    Nonrheumatic aortic valve stenosis    Pulmonary HTN (HCC)    [facility] systolic [facility]ive [facility] failure [facility]    Essential hypertension    Mixed hyperlipidemia    CKD (chronic kidney disease) stage 3, GFR 30-59 ml/min ([facility])    Type 2 diabetes mellitus with complication, without long-term current use of insulin ([facility])    Arthritis    H/O: gout    Hiatal hernia    Gastroesophageal reflux disease    Gastric ulcer    Gastric polyps    Pyloric stenosis    Chronic blood loss anemia    OA (osteoarthritis)    Vitamin D deficiency    Anemia    Closed left hip fracture, initial encounter ([facility]       INSURANCE INFORMATION:  Payor: Roque Abel /  /  /     ADVANCED DIRECTIVES:   Advance Directives (For Healthcare)  Healthcare Directive: Unknown, patient unable to respond due to medical condition  [unfilled]     EMERGENCY CONTACT:       RISK FACTORS:   Social History     Tobacco Use    Smoking status: Never Smoker    Smokeless tobacco: Never Used   Substance Use Topics    Alcohol use: Yes     Comment: rare wine;  drinks 1 cup of coffee daily;  occas hot tea       ALLERGIES:  Allergies   Allergen Reactions    Iodine     Iv Dye [Iodides]        IMMUNIZATIONS:  Immunization History   Administered Date(s) Administered    DTaP 06/11/2008       SWALLOWING:   Difficulty Chewing or Swallowing Food: No    VISION AND HEARING:   Sensory Problems  Visual impairment: Glasses  Hearing impairment: Hard of hearing    PHYSICIANS INVOLVED WITH CARE:    Rose Machuca MD  No ref.  provider found MD David Magana MD

## 2020-06-17 NOTE — PROGRESS NOTES
Physical Therapy  Initial Evaluation  Evaluating Therapist: Treri Amin DPT [id number]    NAME: Greg Bernard  ROOM: 6563A  DIAGNOSIS: Closed L hip fracture  PRECAUTIONS: Falls, WBAT LLE, [place]   PROCEDURE(S): L hip closed reduction and cephalomedullary nail fixation 6/4/20  HPI: Pt suffered a fall at home when reaching towards the floor to adjust a rug. Pt was admitted to ED and had the above procedure performed. Significant PMHX of DM, HTN, arthritis, and CHF. Social:  Pt lives with her daughter in a ranch style floor plan with 2 steps and 2 rails to enter. Prior to admission pt ambulated with [address] independently. Pt has 5 children who are local.     Initial Evaluation  6/17/20          Short Term Goals Long Term Goals    Was pt agreeable to Eval/treatment? yes Initial Evaluation Initial Evaluation     Does pt have pain? 7/10 L knee pain       Bed Mobility  Rolling: Mod A  Supine to sit: Mod A  Sit to supine: Mod A  Scooting: Mod A   SBA Supervision   Transfers Sit to stand: Mod A  Stand to sit: Mod A  Stand pivot: Mod A with [address]   Min A SBA   Ambulation   10 feet with [address] with Mod A   50 feet with WW with Min A 75 feet with WW with SBA   Walking 10 feet on uneven surface NT   10 feet with [address] with Min A 10 feet with [address] with SBA   Wheel Chair Mobility NT       Car Transfers NT   Min A SBA   Stair negotiation: ascended and descended Attempted, unable to complete. Completed 2 inch step in parallel bars with backward descending   2 steps with 2 rails with Min A 4 steps with 2 rails with SBA   Curb Step:   ascended and descended NT       Picking up object off the floor NT       BLE ROM WFL       BLE Strength RLE: 4-/5  L hip: 3-/5  L knee/ankle: 3+/5       Balance  Sitting: SBA  Standing: Mod A with [address]       Date Family Teach Completed TBA       Is additional Family Teaching Needed?   Y or N Y       Hindering Progress Pain, weakness       PT recommended ELOS 2-3 weeks       Team's Discharge Plan        Therapist at Team devices or modalities to obtain goals. Patient and family education will also be administered as needed. Frequency of treatments will be 2x/day M-F and 1x/day Sat or Sun x  2-3 weeks.     Time in: 1045  Time out: [address] [place], [place]  [id number]

## 2020-06-17 NOTE — PROGRESS NOTES
Physical Therapy  Treatment Note  Evaluating Therapist: Simone Jarvis DPT [id number]    NAME: Radha Hernadez  ROOM: 0655N  DIAGNOSIS: Closed L hip fracture  PRECAUTIONS: Falls, WBAT LLE, [place]   PROCEDURE(S): L hip closed reduction and cephalomedullary nail fixation 6/4/20  HPI: Pt suffered a fall at home when reaching towards the floor to adjust a rug. Pt was admitted to ED and had the above procedure performed. Significant PMHX of DM, HTN, arthritis, and CHF. Social:  Pt lives with her daughter in a ranch style floor plan with 2 steps and 2 rails to enter. Prior to admission pt ambulated with [employer] independently. Pt has 5 children who are local.     Initial Evaluation  6/17/20      PM    Short Term Goals Long Term Goals    Was pt agreeable to Eval/treatment? yes Initial Evaluation Yes     Does pt have pain? 7/10 L knee pain  L hip pain with movement 10/10     Bed Mobility  Rolling: Mod A  Supine to sit: Mod A  Sit to supine: Mod A  Scooting: Mod A  Rolling: Mod A  Supine to sit: Mod A  Sit to supine: Mod A  Scooting: Mod A SBA Supervision   Transfers Sit to stand: Mod A  Stand to sit: Mod A  Stand pivot: Mod A with [employer]  Sit to stand: Mod A  Stand to sit: Mod A  Stand pivot: Mod A with [employer] Min A SBA   Ambulation   10 feet with [employer] with Mod A  12 feet with [employer] with Mod A 50 feet with [employer] with Min A 75 feet with WW with SBA   Walking 10 feet on uneven surface NT  NT 10 feet with [employer] with Min A 10 feet with [employer] with SBA   Wheel Chair Mobility NT  NT     Car Transfers NT  NT Min A SBA   Stair negotiation: ascended and descended Attempted, unable to complete.  Completed 2 inch step in parallel bars with backward descending  NT 2 steps with 2 rails with Min A 4 steps with 2 rails with SBA   Curb Step:   ascended and descended NT  NT     Picking up object off the floor NT  NT     BLE ROM [facility]  WF     BLE Strength RLE: 4-/5  L hip: 3-/5  L knee/ankle: 3+/5  RLE: 4-/5  L hip: 3-/5  L knee/ankle: 3+/5     Balance  Sitting: SBA  Standing: Mod A with [employer]  Sitting: SBA  Standing: Mod A with [employer]     Date Family Teach Completed TBA  Pt's granddaughter present for hands-on 6/17     Is additional Family Teaching Needed? Y or N Y  Yes     Hindering Progress Pain, weakness  Pain, weakness     PT recommended ELOS 2-3 weeks       Team's Discharge Plan        Therapist at Team Meeting          Therapeutic Exercise:   PM:   Ambulation: 2x12 feet with [employer] with Mod A  Stand pivot transfer x2 with [employer] with Mod A  Supine L hip flexion AAROM (pt self-assisted with sheet) x10  Sit<>stand x3 at [facility] with Min A    Additional Comments: The pt required multiple attempts to stand but was able. The pt's granddaughter was present for hands-on for transfers and short distance ambulation. The pt improved her sit to stand transfer technique with repetition, she required cues for hand placement for nearly all transfers. Patient/family education  Pt/family educated on bed mobility, transfer technique, guarding technique. Patient/family response to education:   Pt/family verbalized understanding Pt/family demonstrated skill Pt/family requires further education in this area   Yes Yes Reinforce      PM  Time in: 1300  Time out: 1345    Pt is making good progress toward established Physical Therapy goals. Continue with physical therapy current plan of care. Rosemarie Nevarez.  Radha Millard, [facility]  License Number: [id number]

## 2020-06-17 NOTE — PROGRESS NOTES
Occupational Therapy   Occupational Therapy Initial Assessment  Date: 2020   Patient Name: Estella Levin  MRN: [id number]     : 1927  Room: 5515A    Referring Practitioner: Liya Short MD  Diagnosis: s/p fall- L hip fx- s/p closed reduction cephalomedullary nail fixation  Additional Pertinent Hx: DM, HTN, HLD, CHF, CKD, hx BTKR, arthritis    Date of Service: 2020    Discharge Recommendations:  Home with assist PRN, Home with Home health OT     Restrictions   Fall Risk, WBAT LLE, [employer] & has hearing aides    Subjective   Chart Reviewed: Yes  Family / Caregiver Present: No  Subjective: Pt presents supine in bed & was agreeable to OT intervention  Pain Comments: Pt did c/o 10/10 L hip pain @ rest & with activity however pt was able to move dispite level of pain reported    Social/Functional History  Lives With: Family  Type of Home: House  Home Layout: One level  Home Access: Stairs to enter with rails  Entrance Stairs - Number of Steps: 2 SOCRATES 1HR  Bathroom Shower/Tub: Walk-in shower, Doors, Shower chair with back  [employer]: [address]: Rolling walker  ADL Assistance: Independent  Homemaking Assistance: Independent  Ambulation Assistance: Independent  Transfer Assistance: Independent  IADL Comments: PLOF pt independent in all areas including ADLs, transfers, mobility & IADLs     Objective   Vision: Impaired  Vision Exceptions: Wears glasses at all times  Hearing: Exceptions to [facility]  Hearing Exceptions: Hard of hearing/hearing concerns;Bilateral hearing aid      Orientation  Overall Orientation Status: Within Functional Limits    Observation/Palpation  Posture: Fair     Balance  Sitting Balance: Stand by assistance  Standing Balance: Minimal assistance  Functional Mobility  Functional - Mobility Device: Rolling Walker  Activity: Other  Assist Level: Minimal assistance  Functional Mobility Comments: Pt require vc's for sequencing & walker management as well as handplacement sit<>stand  Toilet Transfers  Toilet - Technique: Ambulating  Equipment Used: Standard toilet  Toilet Transfer: Moderate assistance  Toilet Transfers Comments: vc's for walker amangement & hand placement     ADL  Feeding: Setup  Grooming: Minimal assistance  UE Bathing: Supervision;Setup;Verbal cueing; Increased time to complete  LE Bathing: Moderate assistance; Increased time to complete;Verbal cueing;Setup(sponge bathing both seated & standing)  UE Dressing: Minimal assistance;Setup; Increased time to complete  LE Dressing: Maximum assistance;Setup;Verbal cueing; Increased time to complete(Introduced AE to facilitiate LB dressing)     Coordination  Movements Are Fluid And Coordinated: Yes     Bed mobility  Supine to Sit: Moderate assistance  Scooting: Minimal assistance  Transfers  Stand Pivot Transfers: Minimal assistance  Sit to stand: Minimal assistance  Stand to sit: Minimal assistance  Transfer Comments: vc's for hand placement & safety     Vision - Basic Assessment  Prior Vision: Wears glasses all the time  Visual History: No significant visual history  Patient Visual Report: No visual complaint reported. Cognition  Overall Cognitive Status: Exceptions  Problem Solving: Assistance required to generate solutions;Assistance required to correct errors made     Sensation  Overall Sensation Status:grossly intact to touch      BUE AROM: Age related arthritic changes noted in BUE. AROM BUE <3/4 in all planes  Right & Left Hand General AROM: Pt has full grasp & release.  Pt able to oppose her thumb to each digit    L Hand General: 4/5  LUE Strength Comment: BUE strength 4-/5 in all planes  R Hand General: 4/5     Hand Dominance: Right    Left Hand Strength -    Handle Setting 2: 26# & norm for pt age & gender is 38#  Right Hand Strength -    Handle Setting 2: 30# & norm for pt age & gender is 43#    Fine Motor Skills  Left 9-Hole Peg Test: 30.3 seconds & norm for pt age & gender is 24.1 seconds  Right 9-Hole

## 2020-06-17 NOTE — PROGRESS NOTES
Therapeutic Recreation Assessment     LEISURE INTEREST SURVEY               Key: P = Past Interest C = Current Interest F = Future Interest     Arts/Crafts:  ___Mechanical  ___Woodworking    ___ Painting   ___Floral arranging ___ Ceramics         _ __ Knitting                                                     ___ Crocheting        ___Other: ___________      Cooking:  Ardia Scrivener ___Cooking    ___   Other: _______   Comments:       Games:  C___Cards  ___Darts  ___Board games    ___Word games  ___Crossword puzzles    ___Seek-n-find/jumble                   _C__Other: __Slots / Bingo_______      Horticulture:  ___Vegetables  ___Flowers  ___House plants                                                     ___Other: ___________      House/Yard Care:  ___Ironing  ___Laundry  ___Cleaning                                                      ___Repairs              ___Lawn care                                                     ___Other: ___________      Music Listening/Playing:  ___Classical       ___Big Band       _N__Axnq-x-Cqtb                                                     ___Country          ___R&B             ___Gospel/Hymns                                                     ___Other: ___________      Outdoor Activities:  ___Hunting          ___Fishing          ___Camping                                                     ___Other: ___________      Pets/Animals:  P_Dogs             ___Cats                ___Fish                                                     ___Birds             ___Livestock         ___Other: _________    Reading:   ___Newspapers  ___Magazines ___Other:stories                                                     ___Other: ___________      Presybeterian Activities:  Yazdanism: ___________   Lobato Sciara Activities: ___________      Shopping:   ___Grocery  ___Clothing  ___Flea market     ___Other: ___________      Socialization: C___Family  P___Friends  ___Neighbors       ___Church ___Volunteer ___Club/Organization                                                     ___Other: ___________    Sports/Exercises:  ___Walking  ___Football  ___Basketball     ___Golf  ___Baseball  ___Tennis       ___Bowling  ___Other: ___________      Traveling:   ___Global  _P__Stateside  _C__Local       ___Other: ___________      TV/Radio:   ___Mysteries  ___Comedies ___Romance                                                     ___Game show       ___Sports       ___News                                                      ___Talk show          _C  Other: __Hallmark_________      Other:     Personal Leisure Profile:   Question Response   Attributes Identify a positive quality: []Ambitious  []Appreciative  [x]Caring  []Courteous  []Considerate  []Compassionate  []Creative  []Dependable   []Generous  []Honest  []Hard working  [employer]  []Kind  []Dickey   []Artesian  []Mannerly  []Patient  []Polite  []Respectful  []Sociable  []Sense of humor  []Thoughtful  []Other: ___________    Milka Exon are very good at Cooking and baking   Awareness Why do you participate in your leisure interest: []Competition  []Creativity  []Concentration  []Exercise  []Enjoyment  []Fun  []Hand/eye Coordination  []Increase confidence  []Learning a new skill  []Relaxation  []Social Interaction  []Supporting one another  []Thinking  [x]Other: _\"Keep the family together\"__________    Are your leisure activities limited at this time? [x]Yes  []No  Comments: _________    How often do you participate in your leisure interest? I just watch TV   Resources Name a restaurant, store or business you frequent? [employer]   Personal When are you most happy?  Family     Barriers to Leisure Participation:  []Visual   [x]Ramona  []Cognition/Communication  []Motivation  []Financial  []Transportation  []Time Constraints  [x]Physical Ability  []Leisure Awareness  []Drug/Alcohol  []Other: ___________    Recommendations:  []Group Session  [x]Individual Session  []Client Refused

## 2020-06-17 NOTE — PROGRESS NOTES
Occupational Therapy  OCCUPATIONAL THERAPY DAILY NOTE    Date:2020  Patient Name: Yessi Ambrosio  MRN: [id number]  : 1927  Room: [address] [place], [place] [address]     Diagnosis:s/p fall- L hip fx- s/p closed reduction cephalomedullary nail fixation   Additional Pertinent Hx: DM, HTN, HLD, CHF, CKD, hx BTKR, arthritis    Precautions: falls,WBAT LLE,[place] has hearing aides    Functional Assessment:   Date Status AE  Comments   Feeding 20  Set up      Grooming 20  Minimal Assist      Bathing 20  Moderate Assist      UB Dressing 20  Minimal Assist      LB Dressing 20  Maximal Assist       Homemaking 20   TBA       Functional Transfers / Balance:   Date Status DME  Comments   Sit Balance 20  Supervision      Stand Balance 20  Minimal Assist  ww    [] Tub  [] Shower   Transfer 20  TBA     Commode   Transfer 20  Moderate Assist      Functional   Mobility 20   Minimal Assist  ww Short distance in pt's room    Other:w/c to bed  20  Moderate Assist  ww Assist to lift LLE onto bed      Functional Exercises / Activity:   BUE ROM and strength exercises: 1 # dowel christina 3 sets 10 reps in all planes                                                              Wheel and medium resistive theraputty on table top surface     Sensory / Neuromuscular Re-Education:      Cognitive Skills:   Status Comments   Problem   Solving fair     Memory fair     Sequencing fair     Safety fair       Visual Perception:    Education:  Pt was educated on safe functional transfers with ww     [] Family teach completed on:    Pain Level: 8 /10 LLE     Additional Notes:       Patient has made fair  progress during treatment sessions toward set goals.  Therapy emphasis to obtain goals:Current Treatment Recommendations: Strengthening, Positioning, Gait Training, Safety Education & Training, Balance Training, Patient/Caregiver Education & Training, Self-Care / ADL, Functional Mobility Training, Equipment Evaluation, Education, & procurement, Home Management Training, Endurance Training      [x] Continue with current OT Plan of care.   [] Prepare for Discharge     DISCHARGE RECOMMENDATIONS  Recommended DME:    Post Discharge Care:   []Home Independently  [x]Home with 24hr Care / Supervision []Home with Partial Supervision []Home with Home Health OT []Home with Out Pt OT []Other: ___   Comments:         Time in Time out Tx Time Breakdown  Variance:   First Session    [] Individual Tx-   [] Concurrent Tx -  [] Co-Tx -   [] Group Tx -   [] Time Missed -     Second Session [id number] [x] Individual Tx- 30   [] Concurrent Tx -  [] Co-Tx -   [] Group Tx -   [] Time Missed -     Third Session    [] Individual Tx-   [] Concurrent Tx -  [] Co-Tx -   [] Group Tx -   [] Time Missed -         Total Tx Time:30 mins        Janee Fierro OTR/L [id number]

## 2020-06-18 NOTE — PROGRESS NOTES
Physical Therapy  Weekly Note  Evaluating Therapist: Shari Liriano DPT [id number]    NAME: Khalif Griffith  ROOM: 7805D  DIAGNOSIS: Closed L hip fracture  PRECAUTIONS: Falls, WBAT LLE, [place]   PROCEDURE(S): L hip closed reduction and cephalomedullary nail fixation 6/4/20  HPI: Pt suffered a fall at home when reaching towards the floor to adjust a rug. Pt was admitted to ED and had the above procedure performed. Significant PMHX of DM, HTN, arthritis, and CHF. Social:  Pt lives with her daughter in a ranch style floor plan with 2 steps and 2 rails to enter. Prior to admission pt ambulated with [employer] independently. Pt has 5 children who are local.     Initial Evaluation  6/17/20 6/18/20 Comments Short Term Goals Long Term Goals    Was pt agreeable to Eval/treatment? yes Yes      Does pt have pain? 7/10 L knee pain L hip pain with movement 10/10      Bed Mobility  Rolling: Mod A  Supine to sit: Mod A  Sit to supine: Mod A  Scooting: Mod A Rolling: Mod A  Supine to sit: Mod A  Sit to supine: Mod A  Scooting: Mod A Requires assist with LLE, rolling is painful  (twin bed) SBA Supervision   Transfers Sit to stand: Mod A  Stand to sit: Mod A  Stand pivot: Mod A with [employer] Sit to stand: Mod A  Stand to sit: Mod A  Stand pivot: Mod A with [employer] Able to stand with CGA/Min A after repetitive standing Min A SBA   Ambulation   10 feet with WW with Mod A 12 feet with WW with Min/Mod A Antalgic and slow gait, limited by pain 50 feet with WW with Min A 75 feet with [employer] with SBA   Wheel Chair Mobility NT NT      Car Transfers NT Max A Assist with BLE in and out of car Min A SBA   Stair negotiation: ascended and descended Attempted, unable to complete.  Completed 2 inch step in parallel bars with backward descending 2 steps with 2 rails with Max A (descended retrograde) Assist needed to clear the RLE onto the step 2 steps with 2 rails with Min A 4 steps with 2 rails with SBA   BLE ROM [facility] WFL      BLE Strength RLE: 4-/5  L hip: 3-/5  L knee/ankle:

## 2020-06-18 NOTE — PROGRESS NOTES
off the floor NT NT NT     BLE ROM [facility]  WFL     BLE Strength RLE: 4-/5  L hip: 3-/5  L knee/ankle: 3+/5  RLE: 4-/5  L hip: 3-/5  L knee/ankle: 3+/5     Balance  Sitting: SBA  Standing: Mod A with [address]  Sitting: SBA  Standing: Mod A with [address]     Date Family Teach Completed TBA  Pt's granddaughter present for hands-on 6/17     Is additional Family Teaching Needed? Y or N Y  Yes     Hindering Progress Pain, weakness  Pain, weakness     PT recommended ELOS 2-3 weeks       Team's Discharge Plan        Therapist at Team Meeting          Therapeutic Exercise:   AM:   Ambulation: 4x12 feet with [address] with Mod A that improved to Min A  Stand pivot transfer x4 with [address] with Mod A  Sit<>stand 2x5 at EOM with CGA  PM:   Ambulation: 2x8 feet with WW with Min A  Stand pivot transfer x4 with WW with Min A    Additional Comments: The pt ambulated with a severely antalgic gait with slow gait speed, she improved her technique with cues to stand up straight and use her arms to take weight off the L hip during [address] The pt's sit to stand transfer improved with repetition and cuing, she is most limited by her pain. The pt's daughter was present for the afternoon session and was assisted with setting up a family teach session with social work. The pt was able to complete a car transfer with significant assistance, she was able to stand from the car well but had difficulty bringing her legs into and out of the vehicle. Patient/family education  Pt/family educated on transfer technique, proper use of the [address]. Patient/family response to education:   Pt/family verbalized understanding Pt/family demonstrated skill Pt/family requires further education in this area   Yes Yes Reinforce     AM  Time in: 1045  Time out: 1130    PM  Time in: 1300  Time out: 1345    Pt is making good progress toward established Physical Therapy goals. Continue with physical therapy current plan of care. Ruben Gomez.  Yulia Kevin, [facility]  License Number: [id number]

## 2020-06-18 NOTE — PROGRESS NOTES
Occupational Therapy  OCCUPATIONAL THERAPY DAILY NOTE    Date:2020  Patient Name: Yessi Ambrosio  MRN: [id number]  : 1927  Room: [address] [place], [place] [address]A     Diagnosis:s/p fall- L hip fx- s/p closed reduction cephalomedullary nail fixation   Additional Pertinent Hx: DM, HTN, HLD, CHF, CKD, hx BTKR, arthritis    Precautions: falls,WBAT LLE,[place] has hearing aides    Functional Assessment:   Date Status AE  Comments   Feeding 20  Set up      Grooming 20  Minimal Assist      Bathing 20  Moderate Assist      UB Dressing 20  Minimal Assist      LB Dressing 20  Maximal Assist   Reacher  Pt needed Mod A to thread pants on BLE's with reacher    Homemaking 20   TBA       Functional Transfers / Balance:   Date Status DME  Comments   Sit Balance 20  Supervision      Stand Balance 20  Minimal Assist  ww    [] Tub  [] Shower   Transfer 20  TBA     Commode   Transfer      toileting      20  Moderate Assist       Moderate Assist  Ww, BSC placed over commode          Assist to pull pants up and down. Pt able to complete hygiene following voiding while seated on commode    Functional   Mobility 20   Minimal Assist  ww Short distance in  OT clinic.  Verbal cues to problem solve turning and backing up to seated surfaces    Other:w/c to bed  20  Moderate Assist  ww Assist to lift LLE onto bed      Functional Exercises / Activity:  BUE strength exercises: lynda box with 2 # dowel christina 3 sets 10 reps in all planes                                            Green can do bar 3 sets 15 reps   Pt used reacher to  items off of floor at SBA in preparation for using reacher for LB dressing     Sensory / Neuromuscular Re-Education:      Cognitive Skills:   Status Comments   Problem   Solving fair     Memory fair     Sequencing fair     Safety fair       Visual Perception:    Education:  Pt was educated on use of reacher for LB dressing     [] Family teach completed

## 2020-06-19 NOTE — PROGRESS NOTES
[facility]  Comprehensive Progress Note    Subjective:      George Bennett is a 80 y.o. female admitted to inpatient rehabilitation for impairments and activities limitations in ADLs and mobility secondary to left hip fracture, s/p closed reduction and cephalomedullary nail fixation . No acute events overnight. Endorses left hip pain, which she states is improved with PRN tramadol. No cp, sob, n/v. LLE US negative for DVT. The patient's medical records have been reviewed. Scheduled Meds:metoclopramide, 2.5 mg, TID AC  docusate sodium, 100 mg, BID  senna, 1 tablet, Nightly  aspirin, 81 mg, Daily  carvedilol, 6.25 mg, BID WC  bumetanide, 1 mg, Daily  colchicine, 0.6 mg, Daily  clopidogrel, 75 mg, Daily  potassium chloride, 20 mEq, BID WC  allopurinol, 100 mg, Daily  predniSONE, 5 mg, Daily  glipiZIDE, 5 mg, QAM AC  pantoprazole, 40 mg, QAM AC  vitamin D, 50,000 Units, Once per day on Wed  sacubitril-valsartan, 1 tablet, BID      Continuous Infusions:   dextrose       PRN Meds:hydrALAZINE, 25 mg, Q6H PRN  traMADol, 50 mg, Q6H PRN  calcium carbonate, 500 mg, BID PRN  acetaminophen, 650 mg, Q4H PRN  polyethylene glycol, 17 g, Daily PRN  glucose, 15 g, PRN  dextrose, 12.5 g, PRN  glucagon (rDNA), 1 mg, PRN  dextrose, 100 mL/hr, PRN         Objective:      Vitals:    06/18/20 0655 06/18/20 1451 06/18/20 1730 06/19/20 0850   BP: 135/69 (!) 96/50 110/60 124/62   Pulse: 76 71  74   Resp: 16 16  16   Temp: 98.8 °F (37.1 °C) 97.6 °F (36.4 °C)  98.1 °F (36.7 °C)   TempSrc: Temporal Oral  Temporal   SpO2:  94%     Weight:       Height:           General appearance: Alert, NAD, seen in PT  Lungs: CTA b/l, no w/r/r  Heart: RRR  Abdomen: soft, non-tender, normal bowel sounds  Extremities: LLE mild edema--improved as compared to yesterday, No calf tenderness. Skin: Area of prior blister on L hip now appears as small area of ecchymosis, no longer open and no drainage.    L hip surgical incision c/d/i with staples in place. Neurologic: Alert, oriented. Strength 5/5 BUE. Strength 2/3 b/l HF and left KE, 4/5 right KE, bilateral DF/PF. LLE dependent edema improved today as compared to yesterday-- I saw her later in the afternoon yesterday after she was up all day in therapy, expect this is why edema was increased when I saw her yesterday. Laboratory:    Lab Results   Component Value Date    WBC 6.6 06/19/2020    HGB 8.7 (L) 06/19/2020    HCT 28.0 (L) 06/19/2020    MCV 87.0 06/19/2020     06/19/2020     Lab Results   Component Value Date     06/19/2020    K 4.2 06/19/2020     06/19/2020    CO2 22 06/19/2020    BUN 25 06/19/2020    CREATININE 1.5 06/19/2020    GLUCOSE 86 06/19/2020    CALCIUM 8.3 06/19/2020      Lab Results   Component Value Date    ALT 17 02/20/2020    AST 24 02/20/2020    ALKPHOS 99 02/20/2020    BILITOT 0.7 02/20/2020       Lab Results   Component Value Date    COLORU YELLOW 12/08/2016    GLUCOSEU NEG 12/08/2016    KETUA NEG 12/08/2016    BILIRUBINUR NEG 12/08/2016     Lab Results   Component Value Date    LABA1C 7.7 (H) 11/21/2019       Functional Status:   Physical Therapy:  Bed mobility: Mod A  Transfers: Mod A  Ambulation: 12 ft [employer] Min/Mod A     Occupational Therapy:  Feeding: Setup  Grooming: SBA  UB dressing: Min A  LB dressing: Max A     Assessment/Plan:     80 y.o. female admitted to inpatient rehabilitation for impairments and activities limitations in ADLs and mobility secondary to left hip fracture, s/p closed reduction and cephalomedullary nail fixation .    -Intertrochanteric fracture of the left femur: s/p  left hip closed reduction and cephalomedullary nail fixation on 6/4/2020 by Dr. Stu Hanson. WBAT LLE. Continue acute rehab program.  -Acute pain: Tramadol or tylenol PRN  -Acute blood loss anemia: received 2 units pRBC at [facility] (6/5, 6/7). H&H improved post transfusion.  Monitor H&H.   --Aspiration pneumonia: seen by ID at OSH, treated with Unasyn then switched to oral Levaquin and Augmentin to complete course (stop date 6/19/2020)  -DM: continue glipizide, BG adequately controlled, monitor  -HTN: continue home medications, monitor  -CKD- baseline Cr appears to be 1.4. --Cr at baseline, monitor  -DVT prophylaxis: Home dose Aspirin and Plavix per Ortho recommendations.      -LLE doppler negative for DVT  -Labs noted  -Team conference today      Electronically signed by Dottie Benitez MD on 6/19/2020 at 10:03 AM

## 2020-06-19 NOTE — CARE COORDINATION
Team : Team meeting this date. Plan discussed with patient and granddaughter at bedside and with daughter Mame on phone. Patient making slow progress. Pain has been a barrier as well as LE weakness and endurance. RN and MD following anemia and wound issues. She remains motivatedand understands the plan but is anxious to return home. Patient is at min/ mod levels at present with goals to contact stand by assist.  Per daughter ; this is level needed for her to manage at home as daughter has a shoulder injury. Estimated LOS is 2 more weeks. DC Plan  To home with daughter. Daughter and family plan to provide 24 hour supervision and are will provide dressing and bathing assist as well as all homemaking . Patient will be referred for home health services at discharge. DME TBD    Family teach:   Will schedule closer to dc per team recommendation

## 2020-06-19 NOTE — PROGRESS NOTES
Physical Therapy  Treatment Note  Supervising Therapist: Galina Gaines. Mira Frances, [id number]    NAME: Magy Reynoso  ROOM: 1136I  DIAGNOSIS: Closed L hip fracture  PRECAUTIONS: Falls, WBAT LLE, [place]   PROCEDURE(S): L hip closed reduction and cephalomedullary nail fixation 6/4/20  HPI: Pt suffered a fall at home when reaching towards the floor to adjust a rug. Pt was admitted to ED and had the above procedure performed. Significant PMHX of DM, HTN, arthritis, and CHF. Social:  Pt lives with her daughter in a ranch style floor plan with 2 steps and 2 rails to enter. Prior to admission pt ambulated with [address] independently. Pt has 5 children who are local.     Initial Evaluation  6/17/20 AM     PM    Short Term Goals Long Term Goals    Was pt agreeable to Eval/treatment? yes yes Yes     Does pt have pain? 7/10 L knee pain L hip pain, nauseous L hip pain     Bed Mobility  Rolling: Mod A  Supine to sit: Mod A  Sit to supine: Mod A  Scooting: Mod A NT Rolling: Mod A  Supine to sit: Mod A  Sit to supine: Mod A  Scooting: Mod A SBA Supervision   Transfers Sit to stand: Mod A  Stand to sit: Mod A  Stand pivot: Mod A with [address] Sit to stand: Mod A  Stand to sit: Mod A  Stand pivot: Mod A with [address] Sit to stand: Mod A  Stand to sit: Mod A  Stand pivot: Mod A with [address] Min A SBA   Ambulation   10 feet with [address] with Mod A 12 feet with [address] with Min/Mod A 10 feet with [address] with Min/Mod A 50 feet with WW with Min A 75 feet with WW with SBA   Walking 10 feet on uneven surface NT NT NT 10 feet with [address] with Min A 10 feet with [address] with SBA   Wheel Chair Mobility NT NT NT     Car Transfers NT NT NT Min A SBA   Stair negotiation: ascended and descended Attempted, unable to complete.  Completed 2 inch step in parallel bars with backward descending NT NT 2 steps with 2 rails with Min A 4 steps with 2 rails with SBA   Curb Step:   ascended and descended NT NT NT     Picking up object off the floor NT NT NT     BLE ROM [facility] WFL      BLE Strength RLE: 4-/5  L hip: 3-/5  L knee/ankle: 3+/5 RLE: 4-/5  L hip: 3-/5  L knee/ankle: 3+/5      Balance  Sitting: SBA  Standing: Mod A with [employer] Sitting: SBA  Standing: Mod A with [employer]      Date Family Teach Completed TBA Pt's granddaughter present for hands-on 6/17      Is additional Family Teaching Needed? Y or N Y Y      Hindering Progress Pain, weakness Pain, weakness      PT recommended ELOS 2-3 weeks       Team's Discharge Plan        Therapist at Team Meeting          Therapeutic Exercise:   AM:   - Functional mobility as noted above in chart  - Sit<>stand x3 reps  - Band-resisted ankle pumps x15/side  - LAQ x10/side  - Seated hip abduction x10    Additional Comments: Pt reported significant amounts of nausea during session that increased with activity. Pt at one point reported feeling dizzy. Pt ambulated with a very slow antalgic gait pattern with decreased stance time on LLE. Pt required moderate levels of cueing during stand pivots in order to properly sequence the movement. Pt was not able to tolerate her entire PT session as she requested multiple times to return back to her room so she can rest due to her nausea and dizziness. Pt was escorted back to her room and was left sitting in chair with family member present and all needs met. Patient/family education  Pt/family educated on sit<>stand and stand pivot sequencing    Patient/family response to education:   Pt/family verbalized understanding Pt/family demonstrated skill Pt/family requires further education in this area   Yes Yes Reinforce     AM  Time in: 1045  Time out: 1115      Pt is making good progress toward established Physical Therapy goals. Continue with physical therapy current plan of care.     [place], [place]  [id number]    Therapeutic Exercise:   PM:   Supine AAROM hip/knee flexion 3x5 (pt self assisted with green MFB)  Supine PROM to L hip/knee x20 (limited ROM due to pain)  Supine LLE Manual Resisted Exercises:   -Hip/knee extension x8  -Hip/knee flexion x8  -Straight leg hip extension x8  -Hip abduction: x8  Ambulation: 2x10 feet with WW with Min/Mod A  Stand pivot transfer x2 with WW with Mod A    Additional Comments: The pt has difficulty following instructions at times due to hesitancy to place weight through the LLE. The pt also needs motivation and encouragement as she is often self-limiting. The pt's gait remains antalgic with short B step length, however she uses the walker well and only requires light assistance initially, she begins to require more assistance as she fatigues. Patient/family education  Pt/family educated on importance of daily activity. Patient/family response to education:   Pt/family verbalized understanding Pt/family demonstrated skill Pt/family requires further education in this area   Yes Yes Reinforce     PM  Time in: 1300  Time out: 1345    Pt is making good progress toward established Physical Therapy goals. Continue with physical therapy current plan of care. Agustina Mistry.  Lady Daley, [facility]  License Number: [id number]

## 2020-06-19 NOTE — PATIENT CARE CONFERENCE
[address] [place], [place] [address] [facility] ACUTE REHABILITATION  TEAM CONFERENCE NOTE/PATIENT PLAN OF CARE    Date: 2020  Admission date: 2020  Patient Name: Donnell Wang        MRN: [id number]    : 1927  (80 y.o.)  Gender: female   Rehab diagnosis/surgery with date:  Left intertrochanteric hip fracture 6/3/20 due to fall at home, CM nailing 20 in [facility]  Impairment Group Code:  8.11      MEDICAL/FUNCTIONAL HISTORY/STATUS:  allowed wt. bearing as tolerated to left lower,  on ASA and plavix, recent aspiration pneumonia in the acute hospital , edema of lower extremities, hospital course also complicated by acute blood loss anemia and she was transfused prior to admission    Consultations/Labs/X-rays:   Results for Addis Hickey (MRN [id number]) as of 2020 11:16   Ref. Range 2020 05:46   Hemoglobin Quant Latest Ref Range: 11.5 - 15.5 g/dL 8.7 (L)   Hematocrit Latest Ref Range: 34.0 - 48.0 % 28.0 (L)   20 ultrasound:  No evidence to suggest deep venous thrombosis of the left lower   extremity.        MEDICATION UPDATE:  no recent changes, on Augmentin po for recent aspiration pneumonia, last dose today      NURSING FIMS:    Bowel:   Current level: continent    Bladder:   Current level: continent    Toilet Hygiene:   Current level : mod assist  Short term Toilet hygiene goal: min assist  Long term toilet hygiene goal:  supervision    Skin integrity: left hip incsion with staples  Pain: let hip pain, ultram effective    NUTRITION    Diet  carb controlled  Liquid consistency   thin    SOCIAL INFORMATION:  Lives with: daughter  Prior community services:  none  Home Architecture:  ranch, 2 entry  rail from garage, walk in shower  Prior Level of function:  independent, no driving, used a wheeled walker outside  DME:  wheeled walker , rollator, shower chair, lift chair    FAMILY / PATIENT EDUCATION:  safety and self care are ongoing    PHYSICAL THERAPY    Bed mobility:   Current level: mod assist  Short term bed mobility goal: standby assist  Long term bed mobility goal: supervision    Chair/bed transfers:  Current level: mod assist with wheeled walker  Short term Chair/bed transfers goal: min assist  Long term Chair/bed transfers goal: standby assist      Ambulation:   Current level: 12 ft wheeled walker at min assist-mod assist, antalgic slow gait  Short term ambulation goal: 50 ft.  at min assist  Long term ambulation goal: 75 ft.  at standby assist    Car transfers:   Current level: max assist, help with both lowers  Short term car transfers goal: min assist  Long term car transfers goal:standby assist    Stairs:   Current level : 2 with 2 rails at max assist  Short term stairs goal: 2 at min assist  Long term stairs goal: 4 at standby assist      OCCUPATIONAL THERAPY:      Tub/shower:   Current level: to be assessed    Feeding:  Current level: supervision  Short term feeding goal: Modified independent  Long term feeding goal: Modified independent    Grooming:   Current level: min assist  Short term grooming goal: supervision  Long term grooming goal: Modified independent    Bathing:  Current level: mod assist  Short term bathing goal: min assist  Long term bathing goal: supervision    Homemaking:   Current level: to be assessed    Upper body dressing:  Current level: min assist  Short term upper body dressing goal: supervision  Long term upper body dressing goal: independent    Lower body dressing:  Current level: max assist  Short term lower body dressing goal: mod assist  Long term lower body dressing goal: min assist, decreased goal    Toilet transfer:   Current level: mod assist  Short term toilet transfer goal: min assist  Long term toilet transfer goal: Modified independent    Patient/family's personal goals: \"go back home\"  Factors supporting goal achievement:  good family support  Factors hindering goal achievement:  pain      Discharge Plan   Estimated Length of Stay: 2 weeks Destination: home  Services at Discharge: to be assessed  Equipment at Discharge: to be assessed      INTERDISCIPLINARY TEAM/PHYSICIAN Marcos Mcnair:  continue working towards goals      I approve the established interdisciplinary plan of care as documented within the medical record of Padmini Linton. Electronically signed by Kiana Zhang RN  on 6/19/2020 at 9:22 AM  The following interdisciplinary team members were present:  ELIU Neumann LSW Richard S. Karyn Manifold, JUDIE Finney OTR

## 2020-06-19 NOTE — PROGRESS NOTES
Concurrent Tx -  [] Co-Tx -   [] Group Tx -   [] Time Missed -         Total Tx Time:75 mins        Daksha Azul GONSALEZ/L [id number]   Josef Gates OTR/L [id number]

## 2020-06-20 NOTE — PROGRESS NOTES
Ingris Fan MD   50,000 Units at 06/17/20 0841    sacubitril-valsartan (ENTRESTO) 24-26 MG per tablet 1 tablet  1 tablet Oral BID Kristi Rodríguez MD   1 tablet at 06/19/20 2026    acetaminophen (TYLENOL) tablet 650 mg  650 mg Oral Q4H PRN Kristi Rodríguez MD   650 mg at 06/19/20 0854    polyethylene glycol (GLYCOLAX) packet 17 g  17 g Oral Daily PRN Kristi Rodríguez MD        glucose (GLUTOSE) 40 % oral gel 15 g  15 g Oral PRN Kristi Rodríguez MD        dextrose 50 % IV solution  12.5 g Intravenous PRN Kristi Rodríguez MD        glucagon (rDNA) injection 1 mg  1 mg Intramuscular PRN Kristi Rodríguez MD        dextrose 5 % solution  100 mL/hr Intravenous PRN Kristi Rodríguez MD         Allergies   Allergen Reactions    Iodine     Iv Dye [Iodides]      Principal Problem:    Closed left hip fracture, initial encounter [facility])  Active Problems:    Essential hypertension    Mixed hyperlipidemia    CKD (chronic kidney disease) stage 3, GFR 30-59 ml/min ([facility])    Type 2 diabetes mellitus with complication, without long-term current use of insulin ([facility] [address]    H/O: gout  Resolved Problems:    * No resolved hospital problems. *    Blood pressure (!) 142/88, pulse 66, temperature 98.1 °F (36.7 °C), temperature source Temporal, resp. rate 16, height 5' 3\" (1.6 m), weight 163 lb (73.9 kg), SpO2 94 %. Subjective:  Symptoms:  Stable. She reports weakness. Diet:  Adequate intake. Activity level: Impaired due to weakness. Pain:  She complains of pain that is moderate. Objective:  General Appearance: In no acute distress. Vital signs: (most recent): Blood pressure (!) 142/88, pulse 66, temperature 98.1 °F (36.7 °C), temperature source Temporal, resp. rate 16, height 5' 3\" (1.6 m), weight 163 lb (73.9 kg), SpO2 94 %. Vital signs are normal.    Output: Producing urine and minimal stool output. Lungs:  Normal effort and normal respiratory rate. Breath sounds clear to auscultation. Heart: Normal rate.

## 2020-06-20 NOTE — PLAN OF CARE
Problem: Falls - Risk of:  Goal: Will remain free from falls  Description: Will remain free from falls  Outcome: Met This Shift  Goal: Absence of physical injury  Description: Absence of physical injury  Outcome: Met This Shift     Problem: Pain:  Goal: Pain level will decrease  Description: Pain level will decrease  Outcome: Not Met This Shift  Goal: Control of acute pain  Description: Control of acute pain  Outcome: Not Met This Shift  Goal: Control of chronic pain  Description: Control of chronic pain  Outcome: Not Met This Shift

## 2020-06-20 NOTE — PROGRESS NOTES
Physical Therapy  Treatment Note  Supervising Therapist: Harvinder Eric. Calvin Pfeiffer, [id number]    NAME: Khalif Griffith  ROOM: 4051  DIAGNOSIS: Closed L hip fracture  PRECAUTIONS: Falls, WBAT LLE, [place]   PROCEDURE(S): L hip closed reduction and cephalomedullary nail fixation 6/4/20  HPI: Pt suffered a fall at home when reaching towards the floor to adjust a rug. Pt was admitted to ED and had the above procedure performed. Significant PMHX of DM, HTN, arthritis, and CHF. Social:  Pt lives with her daughter in a ranch style floor plan with 2 steps and 2 rails to enter. Prior to admission pt ambulated with [employer] independently. Pt has 5 children who are local.     Initial Evaluation  6/17/20 AM         Short Term Goals Long Term Goals    Was pt agreeable to Eval/treatment? yes Yes      Does pt have pain? 7/10 L knee pain L hip pain with activity      Bed Mobility  Rolling: Mod A  Supine to sit: Mod A  Sit to supine: Mod A  Scooting: Mod A Supine to sit: Mod A     (hospital bed)  SBA Supervision   Transfers Sit to stand: Mod A  Stand to sit: Mod A  Stand pivot: Mod A with [employer] Sit to stand: Min A  Stand to sit: Min A  Stand pivot: Min A with [employer]  Min A SBA   Ambulation   10 feet with [employer] with Mod A 12 feet with [employer] with CGA/Min A 10 feet with [employer] with Min/Mod A 50 feet with [employer] with Min A 75 feet with WW with SBA   Walking 10 feet on uneven surface NT NT NT 10 feet with [employer] with Min A 10 feet with [employer] with SBA   Wheel Chair Mobility NT NT NT     Car Transfers NT NT NT Min A SBA   Stair negotiation: ascended and descended Attempted, unable to complete.  Completed 2 inch step in parallel bars with backward descending 4 steps with 2 rails with Mod A (descended retrograde) NT 2 steps with 2 rails with Min A 4 steps with 2 rails with SBA   Curb Step:   ascended and descended NT NT NT     Picking up object off the floor NT NT NT     BLE ROM [facility] WFL      BLE Strength RLE: 4-/5  L hip: 3-/5  L knee/ankle: 3+/5 RLE: 4-/5  L hip: 3-/5  L knee/ankle: 3+/5      Balance  Sitting: SBA  Standing: Mod A with [employer] Sitting: Supervision  Standing: Min A with [employer]      Date Family Teach Completed TBA Pt's granddaughter present for hands-on 6/17      Is additional Family Teaching Needed? Y or N Y Y      Hindering Progress Pain, weakness Pain, weakness      PT recommended ELOS 2-3 weeks       Team's Discharge Plan        Therapist at Team Meeting          Therapeutic Exercise:   AM:   Ambulation: 2x12 feet with [employer] with CGA/Min A  Stand pivot transfer x6 with [employer] with Min A that became CGA with repetition    Additional Comments: The pt required less assistance to transfer and ambulate, her gait remains antalgic with less L stance phase. The pt had difficulty advancing the RLE as she fatigued, improved with VCs to push through the walker and stand up straight. Patient/family education  Pt/family educated on MALLIKA LOCKWOOD [facility], posture within [employer]. Patient/family response to education:   Pt/family verbalized understanding Pt/family demonstrated skill Pt/family requires further education in this area   Yes Yes Reinforce     AM  Time in: 0900  Time out: 0930    Pt is making good progress toward established Physical Therapy goals. Continue with physical therapy current plan of care. Cash Guadarrama.  Jennifer Campo, [place]  License Number: [id number]

## 2020-06-21 NOTE — PROGRESS NOTES
06/21/20 1135   Attendance   Activity Games  (Slots)   Participation Active participation   Therapeutic Recreation   Community Reintegration Demonstrates ability to complete social goals   Social Skills Cooperates with others in group activity   Leisure Education Demonstrates knowledge of benefits of leisure involvement  Roni Nice identified exercised my arms as a benefit.)   Time Spent With Patient   Minutes 30

## 2020-06-21 NOTE — PROGRESS NOTES
Occupational Therapy  OCCUPATIONAL THERAPY DAILY NOTE    Date:2020  Patient Name: Charis Blizzard  MRN: [id number]  : 1927  Room: [address] [place] [address]     Diagnosis:s/p fall- L hip fx- s/p closed reduction cephalomedullary nail fixation   Additional Pertinent Hx: DM, HTN, HLD, CHF, CKD, hx BTKR, arthritis    Precautions: falls,WBAT LLE,[place] has hearing aides    Functional Assessment:   Date Status AE  Comments   Feeding 20  Set up      Grooming 20  SBA     Bathing 20  maxA     UB Dressing 20  Minimal Assist      LB Dressing 20  Mod A Reacher, sock aid  Pt managed B socks using reacher/ sock aid with Mod A. Homemaking 20   TBA       Functional Transfers / Balance:   Date Status DME  Comments   Sit Balance 20  Supervision   Pt sat supported in w/c during LE ADL   Stand Balance 20  Minimal Assist  ww Pt stood during functional activity   [] Tub  [] Shower   Transfer 20  TBA     Commode   Transfer      toileting      20  Moderate Assist       Moderate Assist  Ww, BSC placed over commode     Functional   Mobility 20   Minimal Assist  ww    Other:w/c to bed       Sit to stand  20  Moderate Assist       Moderate Assist  ww        ww         From w/c      Functional Exercises / Activity:  Pt performed functional activity seated with S for balance wearing 2# ww. Task was also performed to improve sequencing skills to maximize safety during future ADLs. Pt required max vc to facilitate proper sequencing to efficiently perform task. Pt stood during functional activity with Min A at ww. Sensory / Neuromuscular Re-Education:      Cognitive Skills:   Status Comments   Problem   Solving fair     Memory fair     Sequencing fair     Safety fair       Visual Perception:    Education:  AE, ADL retraining, safe transfer training and techniques to improve standing balance.     [] Family teach completed on:    Pain Level: L LE- Moderate    Additional Notes:       Patient has made fair  progress during treatment sessions toward set goals. Therapy emphasis to obtain goals:Current Treatment Recommendations: Strengthening, Positioning, Gait Training, Safety Education & Training, Balance Training, Patient/Caregiver Education & Training, Self-Care / ADL, Functional Mobility Training, Equipment Evaluation, Education, & procurement, Home Management Training, Endurance Training      [x] Continue with current OT Plan of care.   [] Prepare for Discharge     DISCHARGE RECOMMENDATIONS  Recommended DME:    Post Discharge Care:   []Home Independently  [x]Home with 24hr Care / Supervision []Home with Partial Supervision []Home with Home Health OT []Home with Out Pt OT []Other: ___   Comments:         Time in Time out Tx Time Breakdown  Variance:   First Session  10:35 11:10 [x] Individual Tx- 35mins  [] Concurrent Tx -  [] Co-Tx -   [] Group Tx -   [] Time Missed -     Second Session   [] Individual Tx-  [] Concurrent Tx -  [] Co-Tx -   [] Group Tx -   [] Time Missed -            Third Session    [] Individual Tx-   [] Concurrent Tx -  [] Co-Tx -   [] Group Tx -   [] Time Missed -         Total Tx Time:35 mins        [facility] GONSALEZ/JHONNY [address], [address] [facility], OTR/L [id number]

## 2020-06-22 NOTE — PLAN OF CARE
Problem: Falls - Risk of:  Goal: Will remain free from falls  Description: Will remain free from falls  Outcome: Met This Shift     Problem: Falls - Risk of:  Goal: Absence of physical injury  Description: Absence of physical injury  Outcome: Met This Shift     Problem: Pain:  Goal: Pain level will decrease  Description: Pain level will decrease  Outcome: Ongoing

## 2020-06-22 NOTE — PROGRESS NOTES
Physical Therapy  Treatment Note  Supervising Therapist: Eddy Laughlin. Yeni Colvin, [id number]    NAME: Ashu Arvizu  ROOM: 4009Y  DIAGNOSIS: Closed L hip fracture  PRECAUTIONS: Falls, WBAT LLE, [place]   PROCEDURE(S): L hip closed reduction and cephalomedullary nail fixation 6/4/20  HPI: Pt suffered a fall at home when reaching towards the floor to adjust a rug. Pt was admitted to ED and had the above procedure performed. Significant PMHX of DM, HTN, arthritis, and CHF. Social:  Pt lives with her daughter in a ranch style floor plan with 2 steps and 2 rails to enter. Prior to admission pt ambulated with [employer] independently. Pt has 5 children who are local.     Initial Evaluation  6/17/20 AM     PM    Short Term Goals Long Term Goals    Was pt agreeable to Eval/treatment? yes Yes Yes     Does pt have pain? 7/10 L knee pain Mild- mod L hip pain with activity L hip pain with activity     Bed Mobility  Rolling: Mod A  Supine to sit: Mod A  Sit to supine: Mod A  Scooting: Mod A Rolling: SBA  Supine to sit: SBA  Sit to supine: Elida  Scooting: SBA NT SBA Supervision   Transfers Sit to stand: Mod A  Stand to sit: Mod A  Stand pivot: Mod A with [employer] Sit to stand: SBA from WC and EOB, Min A from other chairs  Stand to sit: SBA  Stand pivot: CGA with [employer] Sit<>stand: Min A  Stand to sit: SBA  Stand pivot: CGA with [employer] Min A SBA   Ambulation   10 feet with [employer] with Mod A 20 feet x 2 with [employer] with CGA 15 feet with WW with SBA 50 feet with [employer] with Min A 75 feet with WW with SBA   Walking 10 feet on uneven surface NT NT NT 10 feet with [employer] with Min A 10 feet with [employer] with SBA   Wheel Chair Mobility NT NT NT     Car Transfers NT NT Mod A Min A SBA   Stair negotiation: ascended and descended Attempted, unable to complete.  Completed 2 inch step in parallel bars with backward descending NT 4 steps with 2 rails with Min A (descended retrograde) 2 steps with 2 rails with Min A 4 steps with 2 rails with SBA   Curb Step:   ascended and descended NT NT NT     Picking up object off the floor NT NT NT     BLE ROM [facility] WFL      BLE Strength RLE: 4-/5  L hip: 3-/5  L knee/ankle: 3+/5 RLE: 4-/5  L hip: 3-/5  L knee/ankle: 3+/5      Balance  Sitting: SBA  Standing: Mod A with [employer] Sitting: Supervision  Standing: CGA/Min A with [employer]      Date Family Teach Completed TBA Pt's granddaughter present for hands-on 6/17      Is additional Family Teaching Needed? Y or N Y Y      Hindering Progress Pain, weakness Pain, weakness      PT recommended ELOS 2-3 weeks       Team's Discharge Plan        Therapist at Team Meeting            Therapeutic Exercise:   AM:   Sit to stand 2 x 4 with [employer] and SBA from [facility]  Sit to stand, 2 steps forward, pivot transfer to EOB x 4 with [employer] and SBA  Bed mobility performed as listed in chart above  PM:   Sit<>stand x4 with Min A to and from [facility]  Ambulation: 2x15 feet with WW with SBA    Additional Comments:   AM:  Pt increased ambulation distance to 20 feet with pt having antalgic gait on LLE during L stance phase of gait. Pt had significantly increased difficulty standing from low chair with bilateral arm rests requiring Carmen with cues for transfer. Pt was able to perform most bed mobility with SBA with pt requiring Carmen x 1 with final step of sit to supine to assist with getting BLE's up onto bed. Plan to progress functional mobility and balance to increase pt independence prior to DC. PM:  The pt required encouragement to perform functional mobility in the afternoon, however she was able to perform all activity with little physical assistance with the exception of bringing her BLE into the vehicle. The pt is quick to say she can't do something, discussed importance of committing to progressive mobility. Patient/family education  Pt/family educated on hand placement on arm rests for STS transfers. Maintaining COG within center of [employer] with standing activities.      Patient/family response to education:   Pt/family verbalized understanding Pt/family

## 2020-06-22 NOTE — PROGRESS NOTES
Occupational Therapy  OCCUPATIONAL THERAPY DAILY NOTE    Date:2020  Patient Name: Nathan Chavez  MRN: [id number]  : 1927  Room: [address] [place] [address]     Diagnosis:s/p fall- L hip fx- s/p closed reduction cephalomedullary nail fixation   Additional Pertinent Hx: DM, HTN, HLD, CHF, CKD, hx BTKR, arthritis    Precautions: falls,WBAT [place] has hearing aides    Functional Assessment:   Date Status AE  Comments   Feeding 20  Mod I      Grooming 20   Set up   Seated at sink pt completed oral care and brushed hair    Bathing 20  UB- Min A   LB- Mod A   Sponge bath completed. Assist to wash lower legs and feet and to stand and wash buttocks    UB Dressing 20  Minimal Assist   Pt donned bra and pull over shirt. Assist to fasten bra    LB Dressing 20  Mod A Reacher, sock aid  Pt needed encouragement to try and complete LB dressing. Pt donned depends and pants with reacher. Pt donned socks with sock aid.  Assist to don slip on shoes due to them being tight fitting    Homemaking 20   TBA       Functional Transfers / Balance:   Date Status DME  Comments   Sit Balance 20  Supervision      Stand Balance 20  Minimal Assist  ww Standing to pull up pants    [] Tub  [] Shower   Transfer 20  TBA     Commode   Transfer      toileting      20  Minimal Assist         Moderate Assist  Ww, BSC placed over commode     Functional   Mobility 20   Minimal Assist  ww Back and forth to there bathroom    Other:w/c to bed       Sit to stand  20  Moderate Assist       Minimal Assist  ww        ww              Functional Exercises / Activity:  BUE strength exercises: lynda box with 8 # wt 3 sets 10 reps on table top surface     Sensory / Neuromuscular Re-Education:      Cognitive Skills:   Status Comments   Problem   Solving fair     Memory fair     Sequencing fair     Safety fair       Visual Perception:    Education:  Pt was educated on AE for LB dressing     [] Family teach completed on:    Pain Level: LLE 8 /10     Additional Notes:       Patient has made fair  progress during treatment sessions toward set goals. Therapy emphasis to obtain goals:Current Treatment Recommendations: Strengthening, Positioning, Gait Training, Safety Education & Training, Balance Training, Patient/Caregiver Education & Training, Self-Care / ADL, Functional Mobility Training, Equipment Evaluation, Education, & procurement, Home Management Training, Endurance Training      [x] Continue with current OT Plan of care.   [] Prepare for Discharge     DISCHARGE RECOMMENDATIONS  Recommended DME:    Post Discharge Care:   []Home Independently  [x]Home with 24hr Care / Supervision []Home with Partial Supervision []Home with Home Health OT []Home with Out Pt OT []Other: ___   Comments:         Time in Time out Tx Time Breakdown  Variance:   First Session  [phone]  [x] Individual Tx- 75   [] Concurrent Tx -  [] Co-Tx -   [] Group Tx -   [] Time Missed -     Second Session   [] Individual Tx-  [] Concurrent Tx -  [] Co-Tx -   [] Group Tx -   [] Time Missed -            Third Session    [] Individual Tx-   [] Concurrent Tx -  [] Co-Tx -   [] Group Tx -   [] Time Missed -         Total Tx Time: 75 mins        Braden Bowers OTR/L [id number]

## 2020-06-22 NOTE — PROGRESS NOTES
[facility]  Comprehensive Progress Note    Subjective:      Tammie Ortiz is a 80 y.o. female admitted to inpatient rehabilitation for impairments and activities limitations in ADLs and mobility secondary to left hip fracture, s/p closed reduction and cephalomedullary nail fixation . No acute events overnight. No cp, sob, n/v. Tolerating therapy, progressing. H&H improving. Cr stable. The patient's medical records have been reviewed. Scheduled Meds:metoclopramide, 2.5 mg, TID AC  docusate sodium, 100 mg, BID  senna, 1 tablet, Nightly  aspirin, 81 mg, Daily  carvedilol, 6.25 mg, BID WC  bumetanide, 1 mg, Daily  colchicine, 0.6 mg, Daily  clopidogrel, 75 mg, Daily  potassium chloride, 20 mEq, BID WC  allopurinol, 100 mg, Daily  predniSONE, 5 mg, Daily  glipiZIDE, 5 mg, QAM AC  pantoprazole, 40 mg, QAM AC  vitamin D, 50,000 Units, Once per day on Wed  sacubitril-valsartan, 1 tablet, BID      Continuous Infusions:   dextrose       PRN Meds:hydrALAZINE, 25 mg, Q6H PRN  traMADol, 50 mg, Q6H PRN  calcium carbonate, 500 mg, BID PRN  acetaminophen, 650 mg, Q4H PRN  polyethylene glycol, 17 g, Daily PRN  glucose, 15 g, PRN  dextrose, 12.5 g, PRN  glucagon (rDNA), 1 mg, PRN  dextrose, 100 mL/hr, PRN         Objective:      Vitals:    06/20/20 0850 06/20/20 1626 06/21/20 0843 06/22/20 1138   BP: 124/77 (!) 142/66 (!) 170/85 (!) 150/77   Pulse: 72 64 85 84   Resp: 17  17 16   Temp: 98.5 °F (36.9 °C)  98.7 °F (37.1 °C) 98 °F (36.7 °C)   TempSrc: Oral  Temporal    SpO2: 97%  97% 97%   Weight:       Height:           General appearance: Alert, NAD, seen in PT  Lungs: CTA b/l, no w/r/r  Heart: RRR  Abdomen: soft, non-tender, normal bowel sounds  Extremities: Mild LLE edema, improving. No calf tenderness. Skin:    L hip surgical incision c/d/i   Neurologic: Alert, oriented. Strength 5/5 BUE. Strength 2-3/3 b/l HF and left KE, 4/5 right KE, bilateral DF/PF. Laboratory:    Lab Results   Component Value Date    WBC 5.4 06/22/2020    HGB 9.2 (L) 06/22/2020    HCT 29.6 (L) 06/22/2020    MCV 87.8 06/22/2020     06/22/2020     Lab Results   Component Value Date     06/22/2020    K 4.4 06/22/2020     06/22/2020    CO2 22 06/22/2020    BUN 23 06/22/2020    CREATININE 1.4 06/22/2020    GLUCOSE 90 06/22/2020    CALCIUM 8.1 06/22/2020      Lab Results   Component Value Date    ALT 17 02/20/2020    AST 24 02/20/2020    ALKPHOS 99 02/20/2020    BILITOT 0.7 02/20/2020       Lab Results   Component Value Date    COLORU YELLOW 12/08/2016    GLUCOSEU NEG 12/08/2016    KETUA NEG 12/08/2016    BILIRUBINUR NEG 12/08/2016     Lab Results   Component Value Date    LABA1C 7.7 (H) 11/21/2019       Functional Status:   Physical Therapy:  Bed mobility: Mod A  Transfers: Mod A  Ambulation: 12 ft [facility] Min/Mod A     Occupational Therapy:  Feeding: Setup  Grooming: SBA  UB dressing: Min A  LB dressing: Max A     Assessment/Plan:     80 y.o. female admitted to inpatient rehabilitation for impairments and activities limitations in ADLs and mobility secondary to left hip fracture, s/p closed reduction and cephalomedullary nail fixation .    -Intertrochanteric fracture of the left femur: s/p  left hip closed reduction and cephalomedullary nail fixation on 6/4/2020 by Dr. Stu Hanson. WBAT LLE. Continue acute rehab program.  -Acute pain: Tramadol or tylenol PRN  -Acute blood loss anemia: received 2 units pRBC at [facility] (6/5, 6/7). H&H improved post transfusion. Monitor H&H--improving.   --Aspiration pneumonia: seen by ID at OSH, treated with Unasyn then switched to oral Levaquin and Augmentin to complete course (stop date 6/19/2020)  -DM: continue glipizide, BG adequately controlled, monitor  -HTN: continue home medications, monitor  -CKD- baseline Cr appears to be 1.4. --Cr at baseline, monitor  -DVT prophylaxis: Home dose Aspirin and Plavix per Ortho recommendations. Podiatry consulted      Electronically signed by Sunny Aguillon MD on 6/22/2020 at 1:39 PM

## 2020-06-23 NOTE — PLAN OF CARE
Problem: Pain:  Goal: Pain level will decrease  Description: Pain level will decrease  Outcome: Met This Shift     Problem: Falls - Risk of:  Goal: Will remain free from falls  Description: Will remain free from falls  Outcome: Met This Shift  Goal: Absence of physical injury  Description: Absence of physical injury  Outcome: Met This Shift     Problem: SAFETY  Goal: STG - patient locks brakes on wheelchair  Outcome: Met This Shift  Goal: STG - Patient uses call light consistently to request assistance with transfers  Outcome: Met This Shift

## 2020-06-23 NOTE — PROGRESS NOTES
Physical Therapy  Treatment Note  Supervising Therapist: Cash Guadarrama. Ethel , [id number]    NAME: Merilee Galeazzi  ROOM: 1733Y  DIAGNOSIS: Closed L hip fracture  PRECAUTIONS: Falls, WBAT LLE, [place]   PROCEDURE(S): L hip closed reduction and cephalomedullary nail fixation 6/4/20  HPI: Pt suffered a fall at home when reaching towards the floor to adjust a rug. Pt was admitted to ED and had the above procedure performed. Significant PMHX of DM, HTN, arthritis, and CHF. Social:  Pt lives with her daughter in a ranch style floor plan with 2 steps and 2 rails to enter. Prior to admission pt ambulated with [facility] independently. Pt has 5 children who are local.     Initial Evaluation  6/17/20 AM     PM    Short Term Goals Long Term Goals    Was pt agreeable to Eval/treatment? yes Yes Yes     Does pt have pain? 7/10 L knee pain L hip pain with activity L hip pain with activity     Bed Mobility  Rolling: Mod A  Supine to sit: Mod A  Sit to supine: Mod A  Scooting: Mod A Rolling: SBA  Supine to sit: SBA  Sit to supine: Min A  Scooting: SBA Rolling: SBA  Supine to sit: Min A  Sit to supine: Min A  Scooting: SBA SBA Supervision   Transfers Sit to stand: Mod A  Stand to sit: Mod A  Stand pivot: Mod A with [facility] Sit to stand: SBA  Stand to sit: SBA  Stand pivot: SBA with [facility] Sit to stand: SBA  Stand to sit: SBA  Stand pivot: SBA with [facility] Min A SBA   Ambulation   10 feet with [facility] with Mod A 35 feet with WW with SBA 15 feet with WW with SBA 50 feet with WW with Min A 75 feet with WW with SBA   Walking 10 feet on uneven surface NT NT NT 10 feet with [facility] with Min A 10 feet with [facility] with SBA   Wheel Chair Mobility NT NT NT     Car Transfers NT Max A NT Min A SBA   Stair negotiation: ascended and descended Attempted, unable to complete.  Completed 2 inch step in parallel bars with backward descending NT 4 steps with 2 rails with SBA (descended backward) 2 steps with 2 rails with Min A 4 steps with 2 rails with SBA   Curb Step:   ascended and descended NT NT NT     Picking up object off the floor NT NT NT     BLE ROM [facility] WFL      BLE Strength RLE: 4-/5  L hip: 3-/5  L knee/ankle: 3+/5 RLE: 4-/5  L hip: 3-/5  L knee/ankle: 3+/5      Balance  Sitting: SBA  Standing: Mod A with [employer] Sitting: Supervision  Standing: SBA with [employer]      Date Family Teach Completed TBA Pt's granddaughter present for hands-on 6/17, daughter Isael Gaffney present for hands-on 6/23      Is additional Family Teaching Needed? Y or N Y Y      Hindering Progress Pain, weakness Pain, weakness      PT recommended ELOS 2-3 weeks       Team's Discharge Plan        Therapist at Team Meeting          Therapeutic Exercise:   AM:   Sit<>stand x5 at [facility] with Supervision  Ambulation: 2x15 feet with [employer] with Supervision  PM:   Ambulation: 4x10 feet with WW with SBA  Stair negotiation: 4 steps with 2 rails with Min A for final step (descending forward)    Additional Comments: The pt ambulated for greater distances with the [employer], she fatigued near the end of the walker and her gait became more antalgic, the pt required no physical assistance while transferring or walking. The pt did require light assistance to clear the LLE into the bed when performing bed mobility, attempted multiple times with a leg  and the pt was unable due to pain. The pt continues to require encouragement to perform activity. The pt's daughter was present in the afternoon for hands-on instruction and assisted the pt with transfers and ambulation as well as toileting. The pt's daughter demonstrated good understanding into the pt's deficits and asked insightful questions, she is a physician. The pt descended steps backward without assistance required, attempted stair negotiation once again and descended forward requiring assistance to clear the final step, instruction to always descend backward.  The pt's daughter was glad to see how her mother was doing in therapy and the session was held in a gym with no other pt's or staff members to maintain social distancing requirements. Patient/family education  Pt/family educated on bed mobility, stair negotiation, guarding technique. Patient/family response to education:   Pt/family verbalized understanding Pt/family demonstrated skill Pt/family requires further education in this area   Yes Yes, with assist Yes     AM  Time in: 1045  Time out: 1130    PM  Time in: 1430  Time out: 1515    Pt is making good progress toward established Physical Therapy goals. Continue with physical therapy current plan of care. Lynn Rubin.  Laurel Smallwood, [address] [place] [address]  License Number: [id number]

## 2020-06-23 NOTE — PROGRESS NOTES
06/23/20 1444   Attendance   Activity Cards   Participation Active participation   [place] Demonstrates ability to complete social goals   Leisure Education Demonstrates knowledge of benefits of leisure involvement  (Jaimie Lung identifed winning and fun as benefits.)   Leisure Attitude/Participation Participates in 1:1 structured activity   Time Spent With Patient   Minutes 40

## 2020-06-23 NOTE — PROGRESS NOTES
Occupational Therapy  OCCUPATIONAL THERAPY DAILY NOTE    Date:2020  Patient Name: Eboni Catalan  MRN: [id number]  : 1927  Room: [address] [place] [address]     Diagnosis:s/p fall- L hip fx- s/p closed reduction cephalomedullary nail fixation   Additional Pertinent Hx: DM, HTN, HLD, CHF, CKD, hx BTKR, arthritis    Precautions: falls,WBAT LLE,[place] has hearing aides    Functional Assessment:   Date Status AE  Comments   Feeding 20  Mod I      Grooming 20   Set up      Bathing 20  UB- Min A   LB- Mod A      UB Dressing 20  Minimal Assist      LB Dressing 20  Mod A Reacher, sock aid  Assist to don slip on shoes using long shoe horn    Homemaking 20   CGA/SBA   Standing at table top level folding laundry      Functional Transfers / Balance:   Date Status DME  Comments   Sit Balance 20  Supervision      Stand Balance 20   CGA  ww Table top level    [] Tub  [] Shower   Transfer 20  TBA     Commode   Transfer      toileting      20  Minimal Assist         Moderate Assist  Ww, BSC placed over commode     Functional   Mobility 20   Minimal Assist  ww    Other:w/c <>bed       Sit to stand  20  Minimal Assist         Minimal Assist  ww        ww Assist to lift LLE off of bed              Functional Exercises / Activity:  BUE strength exercises:  Arm bike 5 mins for 3 reps                                             Light red can do bar 3 sets 10 reps     Pt participated in therapeutic activity with focus on standing balance/endurance, UB ROM , fine motor coordination, direction following and problem solving. Pt completed activity at [facility] level.  Pt stood 8 mins x1 rep, 5 mins x 2 reps    B hand strength with 5 # digi flex 3 sets 15 reps     Sensory / Neuromuscular Re-Education:      Cognitive Skills:   Status Comments   Problem   Solving fair     Memory fair     Sequencing fair     Safety fair       Visual Perception:    Education:  Pt was educated on hand placement during sit to stand transfers at  level    [] Family teach completed on:    Pain Level: LLE 8 /10     Additional Notes:       Patient has made fair  progress during treatment sessions toward set goals. Therapy emphasis to obtain goals:Current Treatment Recommendations: Strengthening, Positioning, Gait Training, Safety Education & Training, Balance Training, Patient/Caregiver Education & Training, Self-Care / ADL, Functional Mobility Training, Equipment Evaluation, Education, & procurement, Home Management Training, Endurance Training      [x] Continue with current OT Plan of care.   [] Prepare for Discharge     DISCHARGE RECOMMENDATIONS  Recommended DME:    Post Discharge Care:   []Home Independently  [x]Home with 24hr Care / Supervision []Home with Partial Supervision []Home with Home Health OT []Home with Out Pt OT []Other: ___   Comments:         Time in Time out Tx Time Breakdown  Variance:   First Session  [id number] [x] Individual Tx- 45   [] Concurrent Tx -  [] Co-Tx -   [] Group Tx -   [] Time Missed -     Second Session [id number]  [] Individual Tx-  [x] Concurrent Tx -45  [] Co-Tx -   [] Group Tx -   [] Time Missed -            Third Session    [] Individual Tx-   [] Concurrent Tx -  [] Co-Tx -   [] Group Tx -   [] Time Missed -         Total Tx Time:  90 mins        Abdullahi Young OTR/L [id number]

## 2020-06-23 NOTE — CONSULTS
Consult dictated  Assessment:  1) Left foot pain  2) Left foot contusion  3) Onychomycosis  4) PAD    Plan:  X-rays left foot ordered  Surgical shoe left foot  Debrided toenails 1-5 bilaterally  Thank you for consulting my service    MARILEE Soto [facility]  Fellowship-Trained Foot and Ankle Surgeon  [phone]

## 2020-06-24 NOTE — PROGRESS NOTES
Spoke with Binh Quinn at Dr. Harrison Burton office, they received the x-rays, will give to Dr. Emerald García to review.  Sikp Oleary, RN

## 2020-06-24 NOTE — PROGRESS NOTES
Functional Exercises / Activity:  BUE strength exercises:  2 # weighted ball 3 sets 10 reps in all planes          Sensory / Neuromuscular Re-Education:      Cognitive Skills:   Status Comments   Problem   Solving fair     Memory fair     Sequencing fair     Safety fair       Visual Perception:    Education:  Pt was educated on use of AE for LB dressing   Pt was educated on safe transfers in and out of walk in shower    [] Family teach completed on:    Pain Level: LLE 8 /10     Additional Notes:       Patient has made fair  progress during treatment sessions toward set goals. Therapy emphasis to obtain goals:Current Treatment Recommendations: Strengthening, Positioning, Gait Training, Safety Education & Training, Balance Training, Patient/Caregiver Education & Training, Self-Care / ADL, Functional Mobility Training, Equipment Evaluation, Education, & procurement, Home Management Training, Endurance Training      [x] Continue with current OT Plan of care.   [] Prepare for Discharge     DISCHARGE RECOMMENDATIONS  Recommended DME:    Post Discharge Care:   []Home Independently  [x]Home with 24hr Care / Supervision []Home with Partial Supervision []Home with Home Health OT []Home with Out Pt OT []Other: ___   Comments:         Time in Time out Tx Time Breakdown  Variance:   First Session  0815 0900  [x] Individual Tx- 45   [] Concurrent Tx -  [] Co-Tx -   [] Group Tx -   [] Time Missed -     Second Session 1000  5309 [x] Individual Tx-45  [] Concurrent Tx -  [] Co-Tx -   [] Group Tx -   [] Time Missed -            Third Session    [] Individual Tx-   [] Concurrent Tx -  [] Co-Tx -   [] Group Tx -   [] Time Missed -         Total Tx Time:  90 mins        Mainor Osborne OTR/L [id number]

## 2020-06-24 NOTE — PROGRESS NOTES
Physical Therapy  Treatment Note  Supervising Therapist: Masha Melgarna Aide, [id number]    NAME: Dc Spann  ROOM: [facility]  DIAGNOSIS: Closed L hip fracture  PRECAUTIONS: Falls, WBAT LLE, [facility]   PROCEDURE(S): L hip closed reduction and cephalomedullary nail fixation 6/4/20  HPI: Pt suffered a fall at home when reaching towards the floor to adjust a rug. Pt was admitted to ED and had the above procedure performed. Significant PMHX of DM, HTN, arthritis, and CHF. Social:  Pt lives with her daughter in a ranch style floor plan with 2 steps and 2 rails to enter. Prior to admission pt ambulated with [employer] independently. Pt has 5 children who are local.     Initial Evaluation  6/17/20 AM     PM    Short Term Goals Long Term Goals    Was pt agreeable to Eval/treatment? yes Yes Yes     Does pt have pain? 7/10 L knee pain L hip pain with activity L hip pain, B shoulder pain     Bed Mobility  Rolling: Mod A  Supine to sit: Mod A  Sit to supine: Mod A  Scooting: Mod A Rolling: SBA  Supine to sit: SBA after hand placement to initiate transfer (see comments)  Sit to supine: Carmen   Scooting: SBA Rolling: SBA  Supine to sit: Min A  Sit to supine: SBA  Scooting: SBA  (mat table with shoes off) SBA Supervision   Transfers Sit to stand: Mod A  Stand to sit: Mod A  Stand pivot: Mod A with [employer] Sit to stand: SBA  Stand to sit: SBA  Stand pivot: SBA with [employer] Sit to stand: SBA  Stand to sit: SBA  Stand pivot: SBA with [employer] Min A SBA   Ambulation   10 feet with [employer] with Mod A 20 feet x 2 feet with WW with SBA 40 feet with WW with SBA 50 feet with WW with Min A 75 feet with WW with SBA   Walking 10 feet on uneven surface NT NT NT 10 feet with [employer] with Min A 10 feet with [employer] with SBA   Wheel Chair Mobility NT NT NT     Car Transfers NT Carmen to enter with pt unable to bring legs into car due to pain/fatigue. Max A to exit. (see comments) NT Min A SBA   Stair negotiation: ascended and descended Attempted, unable to complete.  Completed 2 inch step in parallel bars with backward descending NT NT 2 steps with 2 rails with Min A 4 steps with 2 rails with SBA   Curb Step:   ascended and descended NT NT NT     Picking up object off the floor NT NT NT     BLE ROM [facility] WFL      BLE Strength RLE: 4-/5  L hip: 3-/5  L knee/ankle: 3+/5 RLE: 4-/5  L hip: 3-/5  L knee/ankle: 3+/5      Balance  Sitting: SBA  Standing: Mod A with [employer] Sitting: Supervision  Standing: SBA with [employer]      Date Family Teach Completed TBA Pt's granddaughter present for hands-on 6/17, daughter Garrick Robles present for hands-on 6/23      Is additional Family Teaching Needed? Y or N Y Y      Hindering Progress Pain, weakness Pain, weakness      PT recommended ELOS 2-3 weeks       Team's Discharge Plan        Therapist at Team Meeting          Therapeutic Exercise: Forward ambulation 10 feet, 180* turn, 10 feet ambulation to sit in WC x 2 with [employer] and SBA   Standing pivot transfers x 4 from WC to EOB and EOB to WC, SBA  Bed mobility x 2 focusing on lifting LLE up onto bed with sit to supine transfer and focusing on supine to sit transfer. Patient education  Pt educated on hand placement for bed mobility and car transfers. Pt educated on increasing forward lean for sit to stand transfers to allow for easier stand. Patient response to education:   Pt verbalized understanding Pt demonstrated skill Pt requires further education in this area   yes partial yes     Additional Comments:   AM:  Pt continues to have mild antalgic gait on LLE with ambulation with pt able to compensate and maintain balance with BUE's on [employer]. Pt performed bed mobility with SBA with the exception of needing physical hand placement and verbal cues to utilize RUE to initiate supine to sit transfer, and use of leg  to bring LLE onto bed for sit to supine transfer.  Pt has most difficulty with initiating transfers for bed mobility requiring external motivation and verbal/tactile cues to begin with pt able to perform remainder of

## 2020-06-24 NOTE — PROGRESS NOTES
[facility]  Comprehensive Progress Note    Subjective:      Kristen Deshpande is a 80 y.o. female admitted to inpatient rehabilitation for impairments and activities limitations in ADLs and mobility secondary to left hip fracture, s/p closed reduction and cephalomedullary nail fixation . No acute events overnight. No cp, sob, n/v. L foot pain, xray noted, to review with Dr. Damon Cruz. Progressing in therapy, now ambulating 40 ft with standby assist. No other complaints. The patient's medical records have been reviewed. Scheduled Meds:metoclopramide, 2.5 mg, TID AC  docusate sodium, 100 mg, BID  senna, 1 tablet, Nightly  aspirin, 81 mg, Daily  carvedilol, 6.25 mg, BID WC  bumetanide, 1 mg, Daily  colchicine, 0.6 mg, Daily  clopidogrel, 75 mg, Daily  potassium chloride, 20 mEq, BID WC  allopurinol, 100 mg, Daily  predniSONE, 5 mg, Daily  glipiZIDE, 5 mg, QAM AC  pantoprazole, 40 mg, QAM AC  vitamin D, 50,000 Units, Once per day on Wed  sacubitril-valsartan, 1 tablet, BID      Continuous Infusions:   dextrose       PRN Meds:hydrALAZINE, 25 mg, Q6H PRN  traMADol, 50 mg, Q6H PRN  calcium carbonate, 500 mg, BID PRN  acetaminophen, 650 mg, Q4H PRN  polyethylene glycol, 17 g, Daily PRN  glucose, 15 g, PRN  dextrose, 12.5 g, PRN  glucagon (rDNA), 1 mg, PRN  dextrose, 100 mL/hr, PRN         Objective:      Vitals:    06/23/20 1520 06/23/20 2148 06/24/20 0909 06/24/20 1143   BP: (!) 143/73 (!) 149/74 (!) 98/51 132/62   Pulse: 81 72 75 71   Resp: 16 17 16    Temp: 97.8 °F (36.6 °C) 97.8 °F (36.6 °C) 97.6 °F (36.4 °C)    TempSrc: Temporal Oral Temporal    SpO2: 92%      Weight:       Height:           General appearance: Alert, NAD, seen in PT  Lungs: CTA b/l, no w/r/r  Heart: RRR  Abdomen: soft, non-tender, normal bowel sounds  Extremities: LLE ROM impaired, hip precautions  Skin:    L hip surgical incision c/d/i   Neurologic: Alert, oriented. Strength 5/5 BUE.  Strength 2-3/3 b/l HF and left KE, 4/5 right KE, bilateral DF/PF. Laboratory:    Lab Results   Component Value Date    WBC 5.4 06/22/2020    HGB 9.2 (L) 06/22/2020    HCT 29.6 (L) 06/22/2020    MCV 87.8 06/22/2020     06/22/2020     Lab Results   Component Value Date     06/22/2020    K 4.4 06/22/2020     06/22/2020    CO2 22 06/22/2020    BUN 23 06/22/2020    CREATININE 1.4 06/22/2020    GLUCOSE 90 06/22/2020    CALCIUM 8.1 06/22/2020      Lab Results   Component Value Date    ALT 17 02/20/2020    AST 24 02/20/2020    ALKPHOS 99 02/20/2020    BILITOT 0.7 02/20/2020       Lab Results   Component Value Date    COLORU YELLOW 12/08/2016    GLUCOSEU NEG 12/08/2016    KETUA NEG 12/08/2016    BILIRUBINUR NEG 12/08/2016     Lab Results   Component Value Date    LABA1C 7.7 (H) 11/21/2019       Functional Status:   Physical Therapy:  Bed mobility: SBA  Transfers: SBA  Ambulation: 40 ft [employer] SBA     Occupational Therapy:  Feeding: Set up  Grooming: Set up  UB dressing: Min A  LB dressing: Mod A     Assessment/Plan:     80 y.o. female admitted to inpatient rehabilitation for impairments and activities limitations in ADLs and mobility secondary to left hip fracture, s/p closed reduction and cephalomedullary nail fixation .    -Intertrochanteric fracture of the left femur: s/p  left hip closed reduction and cephalomedullary nail fixation on 6/4/2020 by Dr. Maureen Box. WBAT LLE. Continue acute rehab program.  -Acute pain: Tramadol or tylenol PRN  -Acute blood loss anemia: received 2 units pRBC at [facility] (6/5, 6/7). H&H improved post transfusion.  Monitor H&H--improving.   --Aspiration pneumonia: seen by ID at OSH, treated with Unasyn then switched to oral Levaquin and Augmentin to complete course (stop date 6/19/2020)--RESOLVED  -DM: continue glipizide, BG adequately controlled, monitor  -HTN: continue home medications, monitor  -CKD- baseline Cr appears to be 1.4. --Cr at baseline, monitor  -DVT prophylaxis: Home dose Aspirin and Plavix per Ortho recommendations. Left foot pain, xrays reviewed, no fracture or infectious process. Xrays to be reviewed with Dr. Marvin Middleton as well--appreciate podiatry recommendations.  Surgical shoe to L foot per Dr. Jonathan Adrian labs Friday  Team conference Friday       Electronically signed by Derrell Harrison MD on 6/24/2020 at 12:46 PM

## 2020-06-24 NOTE — PROGRESS NOTES
Nutrition Assessment (Low Risk)    Type and Reason for Visit: Initial    Nutrition Recommendations: Continue current diet as ordered. No nutritional risk at this time. Nutrition Assessment:  Patient assessed for nutritional risk. Deemed to be at low risk at this time. Will continue to monitor for changes in status. pt w/ no documented wt changes & consuming % of most meals. Pt denied ONS w/ no additional questions.      Malnutrition Assessment:  · Malnutrition Status: No malnutrition    Nutrition Risk Level   Risk Level: Low    Nutrition Diagnosis:   · Problem: No nutrition diagnosis at this time    Nutrition Intervention:  Food and/or Delivery: Continue current diet  Nutrition Education/Counseling/Coordination of Care:  Continued Inpatient Monitoring, No recommendations at this time      Electronically signed by Castro Lovelace RD, LD on 6/24/20 at 2:26 PM EDT    Contact Number: x [phone]

## 2020-06-24 NOTE — CONSULTS
[address]                  [address] [place],  [facility]                                  CONSULTATION    PATIENT NAME: Key Adam                  :        1927  MED REC NO:   [id number]                            ROOM:       4830  ACCOUNT NO:   [de-identified]                           ADMIT DATE: 2020  PROVIDER:     Kailey Marinelli DPM    CONSULT DATE:  2020    CONSULTANT:  Kailey Marinelli DPM    PRIMARY CARE PROVIDER:  Jeni Green MD    CHIEF COMPLAINT:  Left foot pain. HISTORY OF PRESENT ILLNESS:  This is a pleasant 80-year-old female who  was seen today at the acute rehab floor secondary to recovery from left  hip fracture closed reduction and cephalomedullary nail fixation on  2020. The patient is recovering well from her hip fracture as  expected given her age and activity level. The patient notably also had  complaints of pain to her left foot. I was consulted for  aforementioned. PAST MEDICAL HISTORY:  Significant for osteoarthritis, congestive heart  failure, chronic kidney disease, insulin-dependent diabetes mellitus  with neuropathy, peripheral vascular disease, hypertension,  hyperlipidemia. PAST SURGICAL HISTORY:  Cataract removal, appendectomy, tonsillectomy,  total knee arthroplasty, transesophageal echocardiogram, right leg  arteriogram by Dr. Sangeeta Reagan in , and most recently the closed  reduction and cephalomedullary nail fixation by orthopedic service on  2020 for her left hip fracture. FAMILY HISTORY:  Noncontributory. SOCIAL HISTORY:  The patient is a . Denies any alcohol, tobacco,  or illicit drug use at this time. Currently lives with her daughter in  one-level home with two steps to enter. REVIEW OF SYSTEMS:  Unremarkable outside of chief complaint and history  of present illness. PHYSICAL EXAMINATION:  VITAL SIGNS:  Stable.   GENERAL:  The patient is awake, alert, and oriented x3, in no acute  distress. FOCUSED LOWER EXTREMITY EXAMINATION:  NEUROLOGIC:  Protective sensation absent to bilateral lower extremities. Epicritic sensation is otherwise intact. VASCULAR:  DP and PT pulses are palpable. Biphasic ultrasound signals  noted for the left side secondary to the edema. There is edema noted in  the left side and entire left lower extremity. Skin temperature is warm  to cool, proximal and distal.  Capillary refill is brisk at the toes. DERMATOLOGIC:  Absent hair growth noted bilaterally. No  hyperpigmentation seen at this time. Nonpitting edema noted to the left  foot and leg. There is mild ecchymosis noted to the distal third toe  and the left great toe as well too. No signs of infection. There is no  cellulitis, lymphangitis, or lymphadenopathy noted at this time. Toenails 1 through 5 bilaterally thick, discolored, painful with  elongation as well. MUSCULOSKELETAL:  Pain on palpation to the left mid foot area. Range of  motion in the first MTP joint and the lesser MTP joint limited, but  there is pain noted on range of motion. No pain on palpation to the  plantar foot. Most of the pain on palpation is to the central  metatarsal bones at this time; however, there is no clinical evidence of  any instability. LABORATORY DATA:  Reviewed as per chart. ASSESSMENT:  1. Left foot pain. 2.  Left foot contusion. 3.  Onychomycosis. 4. PAD. 5.  Insulin-dependent diabetes mellitus with neuropathy. TREATMENT PLAN AND RECOMMENDATION:  Lengthy discussion with the patient  and her daughter in detail regarding management and care. At this  point, I ordered three-view x-ray of the left foot. Also ordered  surgical shoes to the left foot. I feel _____ this would help her with  her rehab given that she has to be partial weightbearing to the  extremity, be much more comfortable given the pain to her left foot.    Also, debrided toenails 1 through 5

## 2020-06-25 NOTE — PROGRESS NOTES
Foot and Ankle Surgery note  Subjective:  Patient seen. NAD. No overnight events. SOB:no  Fever: no  Chills: no  Chest Pain: no  Bowel Movement:  no  Urination: no  Calf Pain: no    Focused Lower Extremity Physical Exam:  Vitals:    06/24/20 1513   BP: 119/73   Pulse: 69   Resp: 18   Temp: 97.6 °F (36.4 °C)   SpO2: 93%      NEUROLOGIC:  Protective sensation absent to bilateral lower extremities. Epicritic sensation is otherwise intact.     VASCULAR:  DP and PT pulses are palpable. Biphasic ultrasound signals  noted for the left side secondary to the edema. There is edema noted in  the left side and entire left lower extremity. Skin temperature is warm  to cool, proximal and distal.  Capillary refill is brisk at the toes.     DERMATOLOGIC:  Absent hair growth noted bilaterally. No  hyperpigmentation seen at this time. Nonpitting edema noted to the left  foot and leg. There is mild ecchymosis noted to the distal third toe  and the left great toe as well too. No signs of infection. There is no  cellulitis, lymphangitis, or lymphadenopathy noted at this time. Toenails 1 through 5 bilaterally thick, discolored.     MUSCULOSKELETAL:  Pain on palpation to the left mid foot area. Range of  motion in the first MTP joint and the lesser MTP joint limited, but  there is pain noted on range of motion. No pain on palpation to the  plantar foot. Most of the pain on palpation is to the central  metatarsal bones at this time; however, there is no clinical evidence of  any instability.     Laboratory:    CBC with Differential:    Lab Results   Component Value Date    WBC 5.4 06/22/2020    RBC 3.37 06/22/2020    HGB 9.2 06/22/2020    HCT 29.6 06/22/2020     06/22/2020    MCV 87.8 06/22/2020    MCH 27.3 06/22/2020    MCHC 31.1 06/22/2020    RDW 18.2 06/22/2020    LYMPHOPCT 9.8 06/17/2020    MONOPCT 9.2 06/17/2020    EOSPCT 1.2 02/20/2020    BASOPCT 0.1 06/17/2020    MONOSABS 0.89 06/17/2020    LYMPHSABS 0.95

## 2020-06-25 NOTE — PROGRESS NOTES
Physical Therapy  Treatment Note  Supervising Therapist: Ruben Gomez. Colin Ball, [id number]    NAME: Jarrod Clark  ROOM: 0148R  DIAGNOSIS: Closed L hip fracture  PRECAUTIONS: Falls, WBAT LLE, [place], L post-op shoe  PROCEDURE(S): L hip closed reduction and cephalomedullary nail fixation 6/4/20  HPI: Pt suffered a fall at home when reaching towards the floor to adjust a rug. Pt was admitted to ED and had the above procedure performed. Significant PMHX of DM, HTN, arthritis, and CHF. Social:  Pt lives with her daughter in a ranch style floor plan with 2 steps and  1 rail (L side going up) to enter. Prior to admission pt ambulated with [employer] independently. Pt has 5 children who are local.     Initial Evaluation  6/17/20 AM     PM    Short Term Goals Long Term Goals    Was pt agreeable to Eval/treatment? yes Yes Yes     Does pt have pain? 7/10 L knee pain L hip pain with activity L hip pain/knee pain     Bed Mobility  Rolling: Mod A  Supine to sit: Mod A  Sit to supine: Mod A  Scooting: Mod A Rolling: SBA  Supine to sit: SBA  Sit to supine: SBA  Scooting: SBA  (leg ) Rolling: SBA  Supine to sit: SBA  Sit to supine: SBA  Scooting: SBA  (mat table with shoes off, leg ) SBA Supervision   Transfers Sit to stand: Mod A  Stand to sit: Mod A  Stand pivot: Mod A with [employer] Sit to stand: SBA  Stand to sit: SBA  Stand pivot: SBA with [employer] Sit to stand: SBA  Stand to sit: SBA  Stand pivot: SBA with [employer] Min A SBA   Ambulation   10 feet with [employer] with Mod A 30 feet with WW with SBA NT 50 feet with WW with Min A 75 feet with WW with SBA   Walking 10 feet on uneven surface NT NT NT 10 feet with [employer] with Min A 10 feet with [employer] with SBA   Wheel Chair Mobility NT NT NT     Car Transfers NT NT Mod A (pt's vehicle) Min A SBA   Stair negotiation: ascended and descended Attempted, unable to complete.  Completed 2 inch step in parallel bars with backward descending NT NT 2 steps with 2 rails with Min A 4 steps with 2 rails with SBA   Curb Step: ascended and descended NT NT NT     Picking up object off the floor NT NT NT     [facility]      BLE Strength RLE: 4-/5  L hip: 3-/5  L knee/ankle: 3+/5 RLE: 4-/5  L hip: 3-/5  L knee/ankle: 3+/5      Balance  Sitting: SBA  Standing: Mod A with [employer] Sitting: Supervision  Standing: SBA with [employer]      Date Family Teach Completed TBA Pt's granddaughter present for hands-on 6/17, daughter Natalie Khan present for hands-on 6/23, daughter Joon Lane present 6/25 for hands-on      Is additional Family Teaching Needed? Y or N Y Y      Hindering Progress Pain, weakness Pain, weakness      PT recommended ELOS 2-3 weeks       Team's Discharge Plan        Therapist at Team Meeting          Therapeutic Exercise:   AM:   Ambulation: 2x30 feet with WW with SBA  Stand pivot transfer x6 to and from WC<>low mat table with [employer] with SBA/Supervision  Bed mobility: x2 sets with leg  to and from the L side with SBA  PM:   Ambulation: short distances outside and within the PT gym with [employer] with SBA  Car transfer x2 with Mod A at the pt's daughter's vehicle    Additional Comments: The pt's gait improved, however distance was limited and pt performed 2 repetitions with a seated rest break to recover between sets, her gait speed is improving but remains antalgic with the [employer]. The pt's bed mobility improved when given a leg  as the pt was able to manage her LLE and used the leg  to pull her trunk to upright when returning to a sitting position, discussed procuring a leg  for the pt with OT. The pt's daughter was present for a family teach session in the afternoon, she required assistance with car transfer and the pt's daughter was able to assist well. However, the pt's daughter reported there is only a single railing on the L side going up and the pt was unable to perform stair negotiation with a single rail despite multiple attempts and therapist assistance.  Stair negotiation with a single rail will need to be emphasized moving forward. Patient/family education  Pt/family educated on bed mobility, correct guarding technique for transfers and ambulation, car transfer technique, negotiation of stairs with a single rail. Patient/family response to education:   Pt/family verbalized understanding Pt/family demonstrated skill Pt/family requires further education in this area   Yes With cues/assist Yes     AM  Time in: 0915  Time out: 1000    PM  Time in: 1300  Time out: 1345    Pt is making good progress toward established Physical Therapy goals. Continue with physical therapy current plan of care. Cash Guadarrama.  Jennifer Campo [place]  License Number: [id number]

## 2020-06-25 NOTE — PROGRESS NOTES
Occupational Therapy  OCCUPATIONAL THERAPY DAILY NOTE    Date:2020  Patient Name: Lizzeth Chou  MRN: [id number]  : 1927  Room: [address] [place] [address]     Diagnosis:s/p fall- L hip fx- s/p closed reduction cephalomedullary nail fixation   Additional Pertinent Hx: DM, HTN, HLD, CHF, CKD, hx BTKR, arthritis    Precautions: falls,WBAT LLE,[place] has hearing aides    Functional Assessment:   Date Status AE  Comments   Feeding 20  Mod I      Grooming 20  Set up   Standing at sink for hand hygiene   Bathing 20  UB- set up   LB- Mod A      UB Dressing 20  Minimal Assist      LB Dressing 20  Mod A Reacher, sock aid ,long shoe horn     Homemaking 20   CGA/SBA        Functional Transfers / Balance:   Date Status DME  Comments   Sit Balance 20  Supervision      Stand Balance 20   CGA  ww    [] Tub  [x] Shower   Transfer 20  Min A  Ww, shower seat  Step in method into walk in shower in PM   Commode   Transfer      toileting      20  Minimal Assist         Mod Assist  Ww, BSC placed over commode  While ambulating into bathroom in PM, assist due to urgency and fatigue      Assist with balance with frontal theo hygiene in PM   Functional   Mobility 20  Minimal Assist  ww To/from bathroom in PM   Other:w/c <>bed       Sit to stand       On/off recliner  20    Minimal Assist         Minimal Assist       Minimal Assist  ww        ww      ww      Functional Exercises / Activity:  Pt sitting in chair upon arrival to OT gym. Participated in armbike exercise, seated, 2x5 mins, to increase endurance for ease with ADLs. Engaged in therex using red liliane, 3x15 reps (supination/pronation) and 2# weighted ball, 3x15 reps (elbow/shoulder flex) to increase BUE strength for ease with functional transfers. Completed family teach during 2nd session, see below for assessment.    Sensory / Neuromuscular Re-Education:      Cognitive Skills: [x] Continue with current OT Plan of care.   [] Prepare for Discharge     DISCHARGE RECOMMENDATIONS  Recommended DME:    Post Discharge Care:   []Home Independently  [x]Home with 24hr Care / Supervision []Home with Partial Supervision []Home with Home Health OT []Home with Out Pt OT []Other: ___   Comments:         Time in Time out Tx Time Breakdown  Variance:   First Session  10:00 10:45 [x] Individual Tx- 15  [x] Concurrent Tx -30  [] Co-Tx -   [] Group Tx -   [] Time Missed -     Second Session 1:55 2:40 [x] Individual Tx-45  [] Concurrent Tx -  [] Co-Tx -   [] Group Tx -   [] Time Missed -            Third Session    [] Individual Tx-   [] Concurrent Tx -  [] Co-Tx -   [] Group Tx -   [] Time Missed -         Total Tx Time:  [address] [facility], OTR/L [id number]

## 2020-06-25 NOTE — PROGRESS NOTES
Foot and Ankle Surgery note  Subjective:  Patient seen. NAD. No overnight events. SOB:no  Fever: no  Chills: no  Chest Pain: no  Bowel Movement:  no  Urination: no  Calf Pain: no    Focused Lower Extremity Physical Exam:  Vitals:    06/25/20 0730   BP: (!) 165/76   Pulse: 87   Resp: 18   Temp: 97.3 °F (36.3 °C)   SpO2: 94%      NEUROLOGIC:  Protective sensation absent to bilateral lower extremities. Epicritic sensation is otherwise intact.     VASCULAR:  DP and PT pulses are palpable. Biphasic ultrasound signals  noted for the left side secondary to the edema. There is edema noted in  the left side and entire left lower extremity. Skin temperature is warm  to cool, proximal and distal.  Capillary refill is brisk at the toes.     DERMATOLOGIC:  Absent hair growth noted bilaterally. No  hyperpigmentation seen at this time. Nonpitting edema noted to the left  foot and leg. There is mild ecchymosis noted to the distal third toe  and the left great toe as well too. No signs of infection. There is no  cellulitis, lymphangitis, or lymphadenopathy noted at this time. Toenails 1 through 5 bilaterally thick, discolored.     MUSCULOSKELETAL:  Pain on palpation to the left mid foot area. Range of  motion in the first MTP joint and the lesser MTP joint limited, but  there is pain noted on range of motion. No pain on palpation to the  plantar foot. Most of the pain on palpation is to the central  metatarsal bones at this time; however, there is no clinical evidence of  any instability.     Laboratory:    CBC with Differential:    Lab Results   Component Value Date    WBC 5.4 06/22/2020    RBC 3.37 06/22/2020    HGB 9.2 06/22/2020    HCT 29.6 06/22/2020     06/22/2020    MCV 87.8 06/22/2020    MCH 27.3 06/22/2020    MCHC 31.1 06/22/2020    RDW 18.2 06/22/2020    LYMPHOPCT 9.8 06/17/2020    MONOPCT 9.2 06/17/2020    EOSPCT 1.2 02/20/2020    BASOPCT 0.1 06/17/2020    MONOSABS 0.89 06/17/2020    LYMPHSABS 0.95 06/17/2020    EOSABS 0.04 06/17/2020    BASOSABS 0.01 06/17/2020        Assessment:  1. Left foot pain. 2.  Left foot contusion. 3.  Onychomycosis. 4. PAD. 5.  Insulin-dependent diabetes mellitus with neuropathy. Plan:  X-rays reviewed. No infectious process. Continue surgical shoe. No plans for invasive intervention.     Viet Torres DPM  Fellowship-Trained Foot and Ankle Surgeon  [phone]        Electronically signed by Ignacia Loya DPM on 6/25/2020 at 12:01 PM

## 2020-06-25 NOTE — PLAN OF CARE
Problem: Pain:  Goal: Pain level will decrease  Description: Pain level will decrease  Outcome: Met This Shift     Problem: Pain:  Goal: Control of acute pain  Description: Control of acute pain  Outcome: Met This Shift     Problem: Pain:  Goal: Control of chronic pain  Description: Control of chronic pain  Outcome: Met This Shift     Problem: Falls - Risk of:  Goal: Will remain free from falls  Description: Will remain free from falls  Outcome: Met This Shift     Problem: Falls - Risk of:  Goal: Absence of physical injury  Description: Absence of physical injury  Outcome: Met This Shift     Problem: SAFETY  Goal: STG - Patient uses call light consistently to request assistance with transfers  Outcome: Met This Shift

## 2020-06-25 NOTE — PROGRESS NOTES
1.4. --Cr at baseline, monitor  -DVT prophylaxis: Home dose Aspirin and Plavix per Ortho recommendations. Left foot pain, xrays reviewed, no fracture or infectious process.  No invasive intervention per podiatry    Interval labs in am  Team conference tomorrow       Electronically signed by Gene Aguilar MD on 6/25/2020 at 2:34 PM

## 2020-06-25 NOTE — PROGRESS NOTES
Physical Therapy  Weekly Note  Evaluating Therapist: Shaquille Cleary DPT [id number]    NAME: Vahid Davis  ROOM: 0492C  DIAGNOSIS: Closed L hip fracture  PRECAUTIONS: Falls, WBAT LLE, [place], L post-op shoe  PROCEDURE(S): L hip closed reduction and cephalomedullary nail fixation 6/4/20  HPI: Pt suffered a fall at home when reaching towards the floor to adjust a rug. Pt was admitted to ED and had the above procedure performed. Significant PMHX of DM, HTN, arthritis, and CHF. Social:  Pt lives with her daughter in a ranch style floor plan with 2 steps and  1 rail (L side going up) to enter. Prior to admission pt ambulated with [employer] independently. Pt has 5 children who are local.     Initial Evaluation  6/17/20 6/18/20 6/25/20 Comments Short Term Goals Long Term Goals    Was pt agreeable to Eval/treatment? yes Yes Yes      Does pt have pain? 7/10 L knee pain L hip pain with movement 10/10 L hip pain, B shoulder pain      Bed Mobility  Rolling: Mod A  Supine to sit: Mod A  Sit to supine: Mod A  Scooting: Mod A Rolling: Mod A  Supine to sit: Mod A  Sit to supine: Mod A  Scooting: Mod A Rolling: SBA  Supine to sit: SBA   Sit to supine: SBA   Scooting: SBA Uses leg  to manage the LLE and bring her trunk up when sitting, asked for leg  from OT SBA Supervision   Transfers Sit to stand: Mod A  Stand to sit: Mod A  Stand pivot: Mod A with [employer] Sit to stand: Mod A  Stand to sit: Mod A  Stand pivot:  Mod A with [employer] Sit to stand: SBA  Stand to sit: SBA  Stand pivot: SBA with [employer] Remains inconsistent with performance, when given time she is always able to stand from her chair or bed/mat table (low) Min A Supervision    Ambulation   10 feet with WW with Mod A 12 feet with WW with Min/Mod A 40 feet with WW with SBA Antalgic and slow gait, limited by pain 50 feet with WW with Min A 75 feet with [employer] with SBA   Wheel Chair Mobility NT NT NT      Car Transfers NT Max A Mod A (pt's daughter's vehicle) Assist for BLE management into and out of vehicle Min A SBA   Stair negotiation: ascended and descended Attempted, unable to complete. Completed 2 inch step in parallel bars with backward descending 2 steps with 2 rails with Max A (descended retrograde) 4 steps with 2 rails with SBA (descended retrograde) Attempted forward and the pt had increased pain and required assist to clear the final step 2 steps with 2 rails with Min A 4 steps with 2 rails with SBA   BLE ROM [facility] WFL      BLE Strength RLE: 4-/5  L hip: 3-/5  L knee/ankle: 3+/5 RLE: 4-/5  L hip: 3-/5  L knee/ankle: 3+/5 RLE: 4-/5  L hip: 3-/5  L knee/ankle: 3+/5      Balance  Sitting: SBA  Standing: Mod A with [employer] Sitting: SBA  Standing: Mod A with [employer] Sitting: Supervision  Standing: SBA with [employer]      Date Family Teach Completed TBA Pt's granddaughter present for hands-on 6/17 Pt's granddaughter present for hands-on 6/17, daughter Cruzito Fry present for hands-on 6/23, daughter Amina Meyers present 6/25 for hands-on      Is additional Family Teaching Needed? Y or N Y Yes Yes Prior to discharge to review car and stairs     Hindering Progress Pain, weakness Pain, weakness Pain, weakness      PT recommended ELOS 2-3 weeks 2 weeks 1 week      Team's Discharge Plan  2 weeks Discharge Thursday 7/2/20      Therapist at Team Meeting  Albert Pereira PT, DPT [id number]   Albert Pereira PT, DPT [id number]          Date:  6/19/20  Supporting factors: Motivated, good PLOF  Barriers to discharge:  Daughter works, pain  Additional comments: The pt is making some improvement, her progress is inconsistent and doesn't always maintain from one session to the next. The pt has good family support but her primary support source works through the day. The pt's granddaughter came to a session and did help transfer and ambulate the pt with good technique.    DME:  None from PT, pt owns a [employer]  After Care:  HHPT    Date:  6/26/20  Supporting factors:  Good PLOF  Barriers to discharge:  Daughter works, pain, can be self-limiting  Additional comments: The pt is making good progress, she is now able to perform most functional mobility unassisted, her motivation has been deteriorating over the past week and she is now requiring more assistance to attempt activity. The pt's daughter was present for a family teach session and was able to assist the pt with car transfers, the pt required no assistance to transfer, ambulate, or perform bed mobility with a leg . However, the pt was unable to perform stair negotiation with a single railing despite therapist assistance (pt's daughter reported only a single rail to enter her home). Stair negotiation will be emphasized over the next week. The pt is now wearing a L post-op shoe per podiatry for some soft tissue swelling in the L foot. DME:  None from PT, pt owns a [employer]  After Care:  Jovan Foreman, [id number]  number:  [phone]

## 2020-06-26 NOTE — PROGRESS NOTES
[facility] Physical Medicine and Rehabilitation  Comprehensive Progress Note    Subjective:      David Sethi is a 80 y.o. female admitted to inpatient rehabilitation for impairments and activities limitations in ADLs and mobility secondary to left hip fracture, s/p closed reduction and cephalomedullary nail fixation . No acute events overnight. No cp, sob, n/v.  Patient is tolerating therapy program, progressing. Seen in OT making toast this am. Complains of generalized leg pains. The patient's medical records have been reviewed. Scheduled Meds:metoclopramide, 2.5 mg, TID AC  docusate sodium, 100 mg, BID  senna, 1 tablet, Nightly  aspirin, 81 mg, Daily  carvedilol, 6.25 mg, BID WC  bumetanide, 1 mg, Daily  colchicine, 0.6 mg, Daily  clopidogrel, 75 mg, Daily  potassium chloride, 20 mEq, BID WC  allopurinol, 100 mg, Daily  predniSONE, 5 mg, Daily  glipiZIDE, 5 mg, QAM AC  pantoprazole, 40 mg, QAM AC  vitamin D, 50,000 Units, Once per day on Wed  sacubitril-valsartan, 1 tablet, BID      Continuous Infusions:   dextrose       PRN Meds:hydrALAZINE, 25 mg, Q6H PRN  traMADol, 50 mg, Q6H PRN  calcium carbonate, 500 mg, BID PRN  acetaminophen, 650 mg, Q4H PRN  polyethylene glycol, 17 g, Daily PRN  glucose, 15 g, PRN  dextrose, 12.5 g, PRN  glucagon (rDNA), 1 mg, PRN  dextrose, 100 mL/hr, PRN         Objective:      Vitals:    06/24/20 1513 06/25/20 0730 06/25/20 1600 06/26/20 0915   BP: 119/73 (!) 165/76 124/60 (!) 96/56   Pulse: 69 87 71 92   Resp: 18 18 16 20   Temp: 97.6 °F (36.4 °C) 97.3 °F (36.3 °C) 98.2 °F (36.8 °C) 97.7 °F (36.5 °C)   TempSrc: Temporal Temporal Temporal Oral   SpO2: 93% 94% 92%    Weight:       Height:           General appearance: Alert, NAD, up in w/c  Lungs: CTA b/l, no w/r/r  Heart: RRR  Abdomen: soft, non-tender, normal bowel sounds  Extremities: LLE ROM impaired, hip precautions. Mild LLE edema, improved since admission, no calf tenderness.  Good capillary refill home medications, monitor  -CKD- baseline Cr appears to be 1.4. --Cr at baseline, monitor  -DVT prophylaxis: Home dose Aspirin and Plavix per Ortho recommendations. Left foot pain, general leg pains, xrays reviewed, no fracture or infectious process. No invasive intervention per podiatry. Vascular studies reviewed, PAD noted. Appreciate notes from Dr. Matilde Tan. Will consult Vascular surgery. Patient's daughter requests patient to see Dr. Rojelio Irwin --spoke to office, will be available Monday.      Labs reviewed  Team conference today      Electronically signed by Doug Estrada MD on 6/26/2020 at 10:07 AM

## 2020-06-26 NOTE — PATIENT CARE CONFERENCE
[address] [place], [place] [address] [facility] ACUTE REHABILITATION  TEAM CONFERENCE NOTE/PATIENT PLAN OF CARE    Date: 2020  Admission date: 2020  Patient Name: Guru Kellogg        MRN: [id number]    : 1927  (80 y.o.)  Gender: female   Rehab diagnosis/surgery with date: Left intertrochanteric hip fracture 6/3/20 due to fall at home, CM nailing 20 in [place]  Impairment Group Code:  8.11    MEDICAL/FUNCTIONAL HISTORY/STATUS:  completed antibiotics for aspiration pneumonia,  Podiatry saw for foot pain, surgical shoe ordered, arterial studies show significant vascular disease but no invasive interventions to be completed    Consultations/Labs/X-rays: H/H improving 9.7/32.1   Podiatry consulted  20 xray left foot showed degenerative changes   20 lower extremity arterial studies as below:  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       Significantly diminished almost flat pulse volume recordings noted   over the toes of the right foot and decreased pulse volume recordings   noted over the toes of the left foot       MEDICATION UPDATE:  No medicine changes      NURSING FIMS:    Bowel:   Current level: continent, one incontinent episode    Bladder:   Current level: continent    Toilet Hygiene:   Current level :  Moderate Assist   Short term Toilet hygiene goal: Stand by Assist   Long term toilet hygiene goal:  Stand by Assist     Skin integrity: left hip incision approximated and open to air, blister healed  Pain: left hip pain controlled with tramadol and tylenol    NUTRITION    Diet  Carb controlled, dental soft  Liquid consistency   thin    SOCIAL INFORMATION:  Lives with: daughter  Prior community services:  none  Home Architecture:  ranch, 2 entry  rail from garage, walk in shower  Prior Level of function:  independent, no driving, used a wheeled walker outside  DME:  wheeled walker , rollator, shower chair, lift chair    FAMILY / PATIENT EDUCATION:  6/25 daughter in for teaching, she can only do minimal hands on but will be there 24/7  Granddaughter will do bathing and will plan to do teaching with her    PHYSICAL THERAPY    Bed mobility:   Current level: Stand by Assist with leg  for left lower leg  Short term bed mobility goal: met   Long term bed mobility goal: Supervision     Chair/bed transfers:  Current level: Stand by Assist with wheeled walker, inconsistent with performance, less motivated in last week  Short term Chair/bed transfers goal: Supervision   Long term Chair/bed transfers goal: Supervision       Ambulation:   Current level: 40 ft with wheeled walker Stand by Assist, slow antalgic gait  Short term ambulation goal: 50 ft with Minimum assistance   Long term ambulation goal: 75 ft with Stand by Assist     Car transfers:   Current level: Moderate Assist into pt's daughters vehicle  Short term car transfers goal: Minimum assistance   Long term car transfers goal:Stand by Assist     Stairs:   Current level : 4 with 2 rails Stand by Assist, pt only has 1 rail at home and will focus on this until discharge  Short term stairs goal: met   Long term stairs goal: 4 steps with 1 rail with Stand by Assist     Lower Extremity Strength Issues:  RLE: 4-/5, L hip: 3-/5  L knee/ankle: 3+/5    OCCUPATIONAL THERAPY:    Tub/shower:   Current level: walk in shower, Minimum assistance   Short term tub/shower goal: Minimum assistance   Long term tub/shower goal: Minimum assistance       Feeding:  Current level: Modified Independent   Short term feeding goal: met  Long term feeding goal: met     Grooming:   Current level: Supervision   Short term grooming goal: Modified Independent   Long term grooming goal: Modified Independent     Bathing:  Current level:  Moderate Assist   Short term bathing goal: Minimum assistance   Long term bathing goal: Supervision     Homemaking:   Current level: laundry, Minimum assistance   Short term homemaking goal: met   Long term homemaking goal: met     Upper body dressing:  Current level: Minimum assistance   Short term upper body dressing goal: Supervision   Long term upper body dressing goal: Independent     Lower body dressing:  Current level: Moderate Assist   Short term lower body dressing goal: Minimum assistance   Long term lower body dressing goal: Minimum assistance lots of encourangemtn    Toilet transfer:   Current level: Minimum assistance   Short term toilet transfer goal: Supervision   Long term toilet transfer goal: Modified Independent     Other comments: needs assist with bra      Safety awareness: fair      Patient/family's personal goals: decrease pain, go home   Factors supporting goal achievement:  Good family support, making gains  Factors hindering goal achievement:  Pain, motivation          Discharge Plan   Estimated Length of Stay: 7/2            Destination: home with daughter  Services at Discharge: home health PT, OT, nursing   Equipment at Discharge: has cane, rollator, shower chair, lift   May want 3 in 1 commode which family will purchase independently       INTERDISCIPLINARY TEAM/PHYSICIAN [address] [place] [address] Turnpike:  Continue to work toward goals, bring in granddaughter for family teaching for shower       I approve the established interdisciplinary plan of care as documented within the medical record of Nathan Chavez. Electronically signed by Jesus Cazares RN on 6/26/2020 at 9:18 AM  The following interdisciplinary team members were present:  ELIU Montgomery, LSW  Toby Monterroso, DPT  [employer], OTR  Jazzmine Mcdaniel, Elsaite Stuart 87, CCC-SLP

## 2020-06-26 NOTE — PROGRESS NOTES
Physical Therapy  Treatment Note  Supervising Therapist: Gutierrez Monroy, [id number]    NAME: Estrella Caldwell  ROOM: 3749X  DIAGNOSIS: Closed L hip fracture  PRECAUTIONS: Falls, WBAT LLE, [place], L post-op shoe  PROCEDURE(S): L hip closed reduction and cephalomedullary nail fixation 6/4/20  HPI: Pt suffered a fall at home when reaching towards the floor to adjust a rug. Pt was admitted to ED and had the above procedure performed. Significant PMHX of DM, HTN, arthritis, and CHF. Social:  Pt lives with her daughter in a ranch style floor plan with 2 steps and  1 rail (L side going up) to enter. Prior to admission pt ambulated with [employer] independently. Pt has 5 children who are local.     Initial Evaluation  6/17/20 AM     PM    Short Term Goals Long Term Goals    Was pt agreeable to Eval/treatment? yes Yes Yes     Does pt have pain? 7/10 L knee pain L hip pain with activity L hip pain and just medicate prior to treatment session      Bed Mobility  Rolling: Mod A  Supine to sit: Mod A  Sit to supine: Mod A  Scooting: Mod A NT Rolling: SBA  Supine to sit: SBA  Sit to supine: SBA  Scooting: SBA  (mat table with shoes off, leg ) SBA Supervision   Transfers Sit to stand: Mod A  Stand to sit: Mod A  Stand pivot: Mod A with [employer] Sit to stand: SBA  Stand to sit: SBA   Sit to stand: SBA  Stand to sit: SBA  Stand pivot: SBA with [employer] Min A SBA   Ambulation   10 feet with [employer] with Mod A 30 feet x 2 with [employer] with SBA 30 feet x2 with ww with SBA 50 feet with WW with Min A 75 feet with WW with SBA   Walking 10 feet on uneven surface NT NT NT 10 feet with [employer] with Min A 10 feet with [employer] with SBA   Wheel Chair Mobility NT NT NT     Car Transfers NT NT NT Min A SBA   Stair negotiation: ascended and descended Attempted, unable to complete.  Completed 2 inch step in parallel bars with backward descending NT NT 2 steps with 2 rails with Min A 4 steps with 2 rails with SBA   Curb Step:   ascended and descended NT NT NT     Picking up object

## 2020-06-26 NOTE — CARE COORDINATION
Team:  Team meeting this date. Reviewed progress and discharge planning with penelope Tobin on phone and with patient at bedside. Progressing towards all goals this week. Discharge set for July 2. Remaining issues are lower body ADL, stairs , mobility safety and endurance,    D/C plan:  Home with penelope Tobin and assist from melyssa. Patient will have 24 hour care at home. Home health services RN PT  OT recommended . Penelope Tobin will review with the office staff of Dr. Mary Valadez and call SW with choice. DME  Has all needed DME. Patients bathroom is 5 feet from her bed. They will develop a call system at home so patient is not going to bathroom alone at night. FTE.:  Grandsharonaughter will come to therapy again prior to discharge. Penelope Tobin will be the one to assist with bathing and dressing so she will come in the am on Thursday prior to d/c. Staff asked to wait to bathe and dress till OT session   The Plan for Transition of Care is related to the following treatment goals:  Home with home hhealth      The Patient and/or patient representative penelope Tobin was provided with a choice of provider and agrees   with the discharge plan. [x] Yes [] No    Freedom of choice list was provided with basic dialogue that supports the patient's individualized plan of care/goals, treatment preferences and shares the quality data associated with the providers.  [x] Yes [] No

## 2020-06-26 NOTE — PROGRESS NOTES
Notified Dr. Jyoti Dietz that the consult for vascular surgery was noted. Physician requested is not on call until Monday. Dr. Jyoti Dietz stated that the consult could be done as an outpatient. This nurse informed the pts daughter who is currently at the bedside.  Jr Huston RN

## 2020-06-26 NOTE — PROGRESS NOTES
Occupational Therapy  OCCUPATIONAL THERAPY DAILY NOTE    Date:2020  Patient Name: Jose J Cartwright  MRN: [id number]  : 1927  Room: [address] [place] [address]     Diagnosis:s/p fall- L hip fx- s/p closed reduction cephalomedullary nail fixation   Additional Pertinent Hx: DM, HTN, HLD, CHF, CKD, hx BTKR, arthritis    Precautions: falls,WBAT LLE,[place] has hearing aides    Functional Assessment:   Date Status AE  Comments   Feeding 20  Mod I      Grooming 20  Set up      Bathing 20  UB- set up   LB- Mod A      UB Dressing 20  Minimal Assist      LB Dressing 20  Mod A Reacher, sock aid ,long shoe horn     Homemaking 20   CGA  ww Pt completed light meal prep seated and standing at kitchen counter level . Pt required CGA for balance when reaching in to retrieve items from refrigerator.  Pt needed 1 seated rest break due to fatigue      Functional Transfers / Balance:   Date Status DME  Comments   Sit Balance 20  Supervision      Stand Balance 20  CGA/SBA  ww During homemaking activity    [] Tub  [x] Shower   Transfer 20  Min A  Ww, shower seat     Commode   Transfer      toileting      20  Minimal Assist         Mod Assist  Ww, BSC placed over commode     Functional   Mobility 20  CGA  ww Throughout kitchen setting    Other:w/c <>bed       Sit to stand from w/c to ww         On/off recliner  20    SBA         SBA          Minimal Assist  Ww,using leg          ww         ww      Functional Exercises / Activity:  BUE strength exercises: 2 # dowel christina 3 sets 10 reps in all planes                                            Mikhail box with 8 # wt 3 sets 10 reps in all planes on table top surface                                             Thin red can do bar 3 sets 15 reps     Sensory / Neuromuscular Re-Education:      Cognitive Skills:   Status Comments   Problem   Solving fair     Memory fair     Sequencing fair Safety fair       Visual Perception:    Education:  Pt was educated on safe retrieval and transport of items in kitchen setting at ww level     [x] Family teach completed on:   6/25/20: Completed family teach this session. Pt's daughter present. Educated daughter on pt's status with ADLs  and vc's for sequencing/initiation of tasks/hand placement with functional transfers. Daughter made aware of pt's declined of AE, reporting that she will assist pt with ADLs. Educated on energy conservation techniques and DME. Educated on removal of throw rugs. Daughter reports that she does not believe a BSC will fit next to pt's bed for nighttime use. Daughter reported that the patient's granddaughter will be at home when daughter is at work. Therapist advised for granddaughter to come in for further family teach to complete hands on assist with ADLs/functional transfers with pt. Pt able to complete functional transfers on hard floor/carpeting, sofa/dining area/bed/walk in shower transfers. Daughter did not complete hands on with transfers and only observed this session. She was educated on body mechanics/hand placement/vc's to provide for pt to increase safety with functional transfers/ADLs. Educated on gait belt. Pt's limiting factor appears to be her activity tolerance. Daughter and pt demo'ing F+ with understanding of education. Pain Level: LLE 8 /10     Additional Notes:   6/25/20: leg  given to pt    Patient has made fair  progress during treatment sessions toward set goals. Therapy emphasis to obtain goals:Current Treatment Recommendations: Strengthening, Positioning, Gait Training, Safety Education & Training, Balance Training, Patient/Caregiver Education & Training, Self-Care / ADL, Functional Mobility Training, Equipment Evaluation, Education, & procurement, Home Management Training, Endurance Training      [x] Continue with current OT Plan of care.   [] Prepare for Discharge     DISCHARGE RECOMMENDATIONS  Recommended DME:    Post Discharge Care:   []Home Independently  [x]Home with 24hr Care / Supervision []Home with Partial Supervision []Home with Home Health OT []Home with Out Pt OT []Other: ___   Comments:         Time in Time out Tx Time Breakdown  Variance:   First Session  10:00 10:45 [x] Individual Tx- 45  [] Concurrent Tx -  [] Co-Tx -   [] Group Tx -   [] Time Missed -     Second Session [id number]  [] Individual Tx  [x] Concurrent Tx 45-  [] Co-Tx -   [] Group Tx -   [] Time Missed -            Third Session    [] Individual Tx-   [] Concurrent Tx -  [] Co-Tx -   [] Group Tx -   [] Time Missed -         Total Tx Time:  Bedřicha Smetany 405 OTR/L [id number]

## 2020-06-26 NOTE — PROGRESS NOTES
Foot and Ankle Surgery note  Subjective:  Patient seen. NAD. No overnight events. SOB:no  Fever: no  Chills: no  Chest Pain: no  Bowel Movement:  no  Urination: no  Calf Pain: no    Focused Lower Extremity Physical Exam:  Vitals:    06/26/20 0915   BP: (!) 96/56   Pulse: 92   Resp: 20   Temp: 97.7 °F (36.5 °C)   SpO2:       NEUROLOGIC:  Protective sensation absent to bilateral lower extremities. Epicritic sensation is otherwise intact.     VASCULAR:  DP and PT pulses are palpable. Biphasic ultrasound signals  noted for the left side secondary to the edema. There is edema noted in  the left side and entire left lower extremity. Skin temperature is warm  to cool, proximal and distal.  Capillary refill is brisk at the toes.     DERMATOLOGIC:  Absent hair growth noted bilaterally. No  hyperpigmentation seen at this time. Nonpitting edema noted to the left  foot and leg. There is mild ecchymosis noted to the distal third toe  and the left great toe as well too. No signs of infection. There is no  cellulitis, lymphangitis, or lymphadenopathy noted at this time. Toenails 1 through 5 bilaterally thick, discolored.     MUSCULOSKELETAL:  Pain on palpation to the left mid foot area. Range of  motion in the first MTP joint and the lesser MTP joint limited, but  there is pain noted on range of motion. No pain on palpation to the  plantar foot. Most of the pain on palpation is to the central  metatarsal bones at this time; however, there is no clinical evidence of  any instability.     Laboratory:    CBC with Differential:    Lab Results   Component Value Date    WBC 5.5 06/26/2020    RBC 3.66 06/26/2020    HGB 9.7 06/26/2020    HCT 32.1 06/26/2020     06/26/2020    MCV 87.7 06/26/2020    MCH 26.5 06/26/2020    MCHC 30.2 06/26/2020    RDW 18.1 06/26/2020    LYMPHOPCT 9.8 06/17/2020    MONOPCT 9.2 06/17/2020    EOSPCT 1.2 02/20/2020    BASOPCT 0.1 06/17/2020    MONOSABS 0.89 06/17/2020    LYMPHSABS 0.95 06/17/2020    EOSABS 0.04 06/17/2020    BASOSABS 0.01 06/17/2020        Assessment:  1. Left foot pain. 2.  Left foot contusion. 3.  Onychomycosis. 4. PAD. 5.  Insulin-dependent diabetes mellitus with neuropathy. Plan:  X-rays reviewed. No infectious process. Continue surgical shoe. No plans for invasive intervention. Vascular studies reviewed - significant disease noted. Recommend vascular surgery consultation as her pain may also have vascular etiology.     Susanne Garduno DPM  Fellowship-Trained Foot and Ankle Surgeon  [phone]        Electronically signed by Corey Márquez DPM on 6/26/2020 at 12:40 PM

## 2020-06-27 NOTE — PROGRESS NOTES
06/17/2020    EOSABS 0.04 06/17/2020    BASOSABS 0.01 06/17/2020        Assessment:  1. Left foot pain. 2.  Left foot contusion. 3.  Onychomycosis. 4. PAD. 5.  Insulin-dependent diabetes mellitus with neuropathy. Plan:  X-rays reviewed. No infectious process. Continue surgical shoe. No plans for invasive intervention. Vascular studies reviewed - significant disease noted. Recommend vascular surgery consultation as her pain may also have vascular etiology. Patient's daughter requesting Dr. Mariana Yeung, who is out of town until Monday.     Hays Fabry, DPM  Fellowship-Trained Foot and Ankle Surgeon  [phone]        Electronically signed by Cody Louis DPM on 6/27/2020 at 11:25 AM

## 2020-06-27 NOTE — PROGRESS NOTES
Physical Therapy  Treatment Note  Supervising Therapist: Jessica Prado. Marcelo Nogueira, [id number]    NAME: Manan Amaral  ROOM: 0247S  DIAGNOSIS: Closed L hip fracture  PRECAUTIONS: Falls, WBAT LLE, [place], L post-op shoe  PROCEDURE(S): L hip closed reduction and cephalomedullary nail fixation 6/4/20  HPI: Pt suffered a fall at home when reaching towards the floor to adjust a rug. Pt was admitted to ED and had the above procedure performed. Significant PMHX of DM, HTN, arthritis, and CHF. Social:  Pt lives with her daughter in a ranch style floor plan with 2 steps and  1 rail (L side going up) to enter. Prior to admission pt ambulated with [employer] independently. Pt has 5 children who are local.     Initial Evaluation  6/17/20 AM     Short Term Goals Long Term Goals    Was pt agreeable to Eval/treatment? yes Yes     Does pt have pain? 7/10 L knee pain L hip pain with activity     Bed Mobility  Rolling: Mod A  Supine to sit: Mod A  Sit to supine: Mod A  Scooting: Mod A NT SBA Supervision   Transfers Sit to stand: Mod A  Stand to sit: Mod A  Stand pivot: Mod A with [employer] Sit to stand: SBA  Stand to sit: SBA  Stand pivot with ww with  CG/SBA ( wc <>commode)    Min A SBA   Ambulation   10 feet with WW with Mod A 35 feet x2  with WW with SBA 50 feet with WW with Min A 75 feet with WW with SBA   Walking 10 feet on uneven surface NT NT 10 feet with [employer] with Min A 10 feet with [employer] with SBA   Wheel Chair Mobility NT NT     Car Transfers NT Mod A  Min A SBA   Stair negotiation: ascended and descended Attempted, unable to complete. Completed 2 inch step in parallel bars with backward descending 4 steps with bilateral rails with CGA 2 steps with 2 rails with Min A 4 steps with 2 rails with SBA   Curb Step:   ascended and descended NT NT     Picking up object off the floor NT NT     BLE ROM [facility] WF     BLE Strength RLE: 4-/5  L hip: 3-/5  L knee/ankle: 3+/5 RLE: 4-/5  L hip: 3-/5  L knee/ankle: 3+/5     Balance  Sitting: SBA  Standing:  Mod A with

## 2020-06-27 NOTE — PROGRESS NOTES
[facility] Physical Medicine and Rehabilitation  Comprehensive Progress Note    Subjective:      Eboni Catalan is a 80 y.o. female admitted to inpatient rehabilitation for impairments and activities limitations in ADLs and mobility secondary to left hip fracture, s/p closed reduction and cephalomedullary nail fixation . No acute events overnight. No cp, sob, n/v.  Patient is tolerating therapy program, progressing. No new complaints. The patient's medical records have been reviewed. Scheduled Meds:metoclopramide, 2.5 mg, TID AC  docusate sodium, 100 mg, BID  senna, 1 tablet, Nightly  aspirin, 81 mg, Daily  carvedilol, 6.25 mg, BID WC  bumetanide, 1 mg, Daily  colchicine, 0.6 mg, Daily  clopidogrel, 75 mg, Daily  potassium chloride, 20 mEq, BID WC  allopurinol, 100 mg, Daily  predniSONE, 5 mg, Daily  glipiZIDE, 5 mg, QAM AC  pantoprazole, 40 mg, QAM AC  vitamin D, 50,000 Units, Once per day on Wed  sacubitril-valsartan, 1 tablet, BID      Continuous Infusions:   dextrose       PRN Meds:hydrALAZINE, 25 mg, Q6H PRN  traMADol, 50 mg, Q6H PRN  calcium carbonate, 500 mg, BID PRN  acetaminophen, 650 mg, Q4H PRN  polyethylene glycol, 17 g, Daily PRN  glucose, 15 g, PRN  dextrose, 12.5 g, PRN  glucagon (rDNA), 1 mg, PRN  dextrose, 100 mL/hr, PRN         Objective:      Vitals:    06/26/20 0915 06/26/20 1748 06/27/20 0200 06/27/20 0703   BP: (!) 96/56 128/70 122/68 (!) 161/82   Pulse: 92 88 86 96   Resp: 20 18  16   Temp: 97.7 °F (36.5 °C) 98 °F (36.7 °C)  97.5 °F (36.4 °C)   TempSrc: Oral Temporal  Temporal   SpO2:    95%   Weight:       Height:           General appearance: Alert, NAD, up in w/c  Lungs: CTA b/l, no w/r/r  Heart: RRR  Abdomen: soft, non-tender, normal bowel sounds  Extremities: LLE ROM impaired, hip precautions. Mild LLE edema, improved since admission, no calf tenderness. Good capillary refill BLE  Skin:    L hip surgical incision c/d/i   Neurologic: Alert, oriented. Strength 5/5 BUE. Strength 2-3/3 b/l HF and left KE, 4/5 right KE, bilateral DF/PF. Laboratory:    Lab Results   Component Value Date    WBC 5.5 06/26/2020    HGB 9.7 (L) 06/26/2020    HCT 32.1 (L) 06/26/2020    MCV 87.7 06/26/2020     06/26/2020     Lab Results   Component Value Date     06/26/2020    K 4.7 06/26/2020     06/26/2020    CO2 23 06/26/2020    BUN 21 06/26/2020    CREATININE 1.3 06/26/2020    GLUCOSE 107 06/26/2020    CALCIUM 8.6 06/26/2020      Lab Results   Component Value Date    ALT 17 02/20/2020    AST 24 02/20/2020    ALKPHOS 99 02/20/2020    BILITOT 0.7 02/20/2020       Lab Results   Component Value Date    COLORU YELLOW 12/08/2016    GLUCOSEU NEG 12/08/2016    KETUA NEG 12/08/2016    BILIRUBINUR NEG 12/08/2016     Lab Results   Component Value Date    LABA1C 7.7 (H) 11/21/2019       Functional Status:   Physical Therapy:  Bed mobility: SBA  Transfers: SBA  Ambulation: 40 ft [employer] SBA     Occupational Therapy:  Feeding: Set up  Grooming: Set up  UB dressing: Min A  LB dressing: Mod A     Assessment/Plan:     80 y.o. female admitted to inpatient rehabilitation for impairments and activities limitations in ADLs and mobility secondary to left hip fracture, s/p closed reduction and cephalomedullary nail fixation .    -Intertrochanteric fracture of the left femur: s/p  left hip closed reduction and cephalomedullary nail fixation on 6/4/2020 by Dr. Og Ritchie. WBAT LLE. Continue acute rehab program.  -Acute pain: Tramadol or tylenol PRN  -Acute blood loss anemia: received 2 units pRBC at [facility] (6/5, 6/7). H&H improved post transfusion.  Monitor H&H--improving.   --Aspiration pneumonia: seen by ID at OSH, treated with Unasyn then switched to oral Levaquin and Augmentin to complete course (stop date 6/19/2020)--RESOLVED  -DM: continue glipizide, BG adequately controlled, monitor  -HTN: continue home medications, monitor  -CKD- baseline Cr appears to be 1.4. --Cr at baseline, monitor  -DVT prophylaxis: Home dose Aspirin and Plavix per Ortho recommendations.          Electronically signed by Doug Estrada MD on 6/27/2020 at 12:25 PM

## 2020-06-28 NOTE — PROGRESS NOTES
Occupational Therapy  OCCUPATIONAL THERAPY DAILY NOTE    Date:2020  Patient Name: David Sethi  MRN: [id number]  : 1927  Room: [address] [place] [address]     Diagnosis:s/p fall- L hip fx- s/p closed reduction cephalomedullary nail fixation   Additional Pertinent Hx: DM, HTN, HLD, CHF, CKD, hx BTKR, arthritis    Precautions: falls,WBAT LLE,[place] has hearing aides    Functional Assessment:   Date Status AE  Comments   Feeding 20  Mod I      Grooming 20  Set up      Bathing 20  UB- set up   LB- Mod A      UB Dressing 20  Minimal Assist      LB Dressing 20  Mod A Reacher, sock aid ,long shoe horn     Homemaking 20  SBA ww Pt completed light laundry activity while standing up at the table. Functional Transfers / Balance:   Date Status DME  Comments   Sit Balance 20  Supervision      Stand Balance 20  CGA/SBA  ww During homemaking activity and activities. [] Tub  [x] Shower   Transfer 20  Min A  Ww, shower seat     Commode   Transfer      toileting      20  Minimal Assist         Mod Assist  Ww, BSC placed over commode     Functional   Mobility 20  CGA  ww Within OT room with w/w. Other: w/c <>bed       Sit to stand from w/c to ww         On/off recliner  20    SBA         SBA          Minimal Assist  Ww,using leg          ww         ww      Functional Exercises / Activity:  BUE strength exercises: 2 # dowel christina 3 sets 10 reps in all planes                                           Ladder climb with pt able to go up to the 3rd ladder pins. FM activity with green blocks while standing with verbal prompts to increase completion and standing balance. Sensory / Neuromuscular Re-Education:      Cognitive Skills:   Status Comments   Problem   Solving fair  During transfers and exercises.     Memory fair  \"   Sequencing fair  \"   Safety fair  \"     Visual Perception:    Education:  Pt was educated on safe retrieval and transport of items in kitchen setting at ww level     [x] Family teach completed on:   6/25/20: Completed family teach this session. Pt's daughter present. Educated daughter on pt's status with ADLs  and vc's for sequencing/initiation of tasks/hand placement with functional transfers. Daughter made aware of pt's declined of AE, reporting that she will assist pt with ADLs. Educated on energy conservation techniques and DME. Educated on removal of throw rugs. Daughter reports that she does not believe a BSC will fit next to pt's bed for nighttime use. Daughter reported that the patient's granddaughter will be at home when daughter is at work. Therapist advised for granddaughter to come in for further family teach to complete hands on assist with ADLs/functional transfers with pt. Pt able to complete functional transfers on hard floor/carpeting, sofa/dining area/bed/walk in shower transfers. Daughter did not complete hands on with transfers and only observed this session. She was educated on body mechanics/hand placement/vc's to provide for pt to increase safety with functional transfers/ADLs. Educated on gait belt. Pt's limiting factor appears to be her activity tolerance. Daughter and pt demo'ing F+ with understanding of education. Pain Level: LLE 8 /10     Additional Notes:   6/25/20: leg  given to pt    Patient has made fair  progress during treatment sessions toward set goals. Therapy emphasis to obtain goals:Current Treatment Recommendations: Strengthening, Positioning, Gait Training, Safety Education & Training, Balance Training, Patient/Caregiver Education & Training, Self-Care / ADL, Functional Mobility Training, Equipment Evaluation, Education, & procurement, Home Management Training, Endurance Training      [x] Continue with current OT Plan of care.   [] Prepare for Discharge     DISCHARGE RECOMMENDATIONS  Recommended DME:    Post Discharge Care:   []Home Independently  [x]Home with 24hr Care / Supervision []Home with Partial Supervision []Home with Home Health OT []Home with Out Pt OT []Other: ___   Comments:         Time in Time out Tx Time Breakdown  Variance:   First Session  [id number] [x] Individual Tx- 45  [] Concurrent Tx -  [] Co-Tx -   [] Group Tx -   [] Time Missed -     Second Session   [] Individual Tx  [] Concurrent Tx   [] Co-Tx -   [] Group Tx -   [] Time Missed -            Third Session    [] Individual Tx-   [] Concurrent Tx -  [] Co-Tx -   [] Group Tx -   [] Time Missed -         Total Tx Time:  45 mins     Gokul Appiah GONSALEZ/L, [address]  I have read the above note and agree with the documentation.   Prince Wilson OTR/L [id number]

## 2020-06-29 NOTE — CONSULTS
Vascular Surgery Consult Note      Chief Complaint: Left lower extremity leg swelling pain    HISTORY OF PRESENT ILLNESS:                The patient is a 80 y.o. female who presents to the after recent hip injury. She is currently on rehab. She describes some pain and discomfort associated with her left leg and swelling. She has lower extremity bilateral swelling. She has a history of peripheral vascular disease  she had been evaluated by my partners in the past in 2013. There is also a brief note that she was also supposed to see Dr. Roy Back at the [facility] around 2013. She had peripheral angiography of the right extremity demonstrating severe tibial vessel disease with no inline flow. Her main complaint now is left leg swelling. She has no foot pain no disc comfort. She has no tissue loss. She has some discoloration of her left fourth and fifth toe with some bruising. She does not recall injuring this segment. A family member is present and told me that that portion of the foot/toe is improving. Their main concern is the leg swelling. She denies any chest pain shortness of breath or discomfort. She has had an ultrasound on 6/18/2020 demonstrating no evidence of deep venous thrombosis of the left lower extremity. She is also had ABIs on the 25th of this month demonstrated an MARY ELLEN on the right side of 0.48 and on the left side 0.80.     Past Medical History:   Diagnosis Date    Arthritis     CHF (congestive heart failure) (HCC)     CKD (chronic kidney disease)     Diabetic eye exam ([facility] [address] 05/21/2015    Dr Juan Pablo Calvillo diabetic retinopathy    Hyperlipidemia     Hypertension     Iron deficiency anemia     Type II or unspecified type diabetes mellitus without mention of complication, not stated as uncontrolled     Ulcer of foot ([facility] [address] 6/18/2013    Valvular heart disease         Past Surgical History:   Procedure Laterality Date    ADENOIDECTOMY      APPENDECTOMY      CARPAL TUNNEL RELEASE      CATARACT REMOVAL      CATARACT REMOVAL      CHOLECYSTECTOMY      DOPPLER ECHOCARDIOGRAPHY  06/07/2018    Dr Chika Vega mitral regurg-mild tricuspid regurg-trace pulmonic regurg-trace to mild aortic regurg    ENDOSCOPY, COLON, DIAGNOSTIC      JOINT REPLACEMENT      BILAT KNEE    ROTATOR CUFF REPAIR      TONSILLECTOMY      TOTAL KNEE ARTHROPLASTY      TRANSESOPHAGEAL ECHOCARDIOGRAM  06/19/2019    DR Maldonado    UPPER GASTROINTESTINAL ENDOSCOPY  05/28/2015    UPPER GASTROINTESTINAL ENDOSCOPY  11/07/2017    DR JEFF SULLIVAN     KIDNEY DUPLEX BILAT  [place]17/2016    [place] [id number] [place]-normal    VASCULAR SURGERY  4/18/2013    Right leg angiogram by DR Francis       Current Medications:     Current Facility-Administered Medications:     polyethylene glycol (GLYCOLAX) packet 17 g, 17 g, Oral, Daily, Nicolas Zafar MD    lactulose (CHRONULAC) 10 GM/15ML solution 20 g, 20 g, Oral, Daily PRN, Nicolas Zafar MD    metoclopramide (REGLAN) tablet 2.5 mg, 2.5 mg, Oral, TID AC, Nicolas Zafar MD, 2.5 mg at 06/29/20 0907    hydrALAZINE (APRESOLINE) tablet 25 mg, 25 mg, Oral, Q6H PRN, Nicolas Zafar MD    docusate sodium (COLACE) capsule 100 mg, 100 mg, Oral, BID, Nicolas Zafar MD, 100 mg at 06/29/20 0908    senna (SENOKOT) tablet 8.6 mg, 1 tablet, Oral, Nightly, Nicolas Zafar MD, 8.6 mg at 06/28/20 2059    traMADol (ULTRAM) tablet 50 mg, 50 mg, Oral, Q6H PRN, Nicolas Zafar MD, 50 mg at 06/29/20 0908    aspirin chewable tablet 81 mg, 81 mg, Oral, Daily, Nicolas Zafar MD, 81 mg at 06/29/20 0908    carvedilol (COREG) tablet 6.25 mg, 6.25 mg, Oral, BID WC, Nicolas Zafar MD, 6.25 mg at 06/29/20 0907    bumetanide (BUMEX) tablet 1 mg, 1 mg, Oral, Daily, Nicolas Zafar MD, 1 mg at 06/29/20 0907    colchicine (COLCRYS) tablet 0.6 mg, 0.6 mg, Oral, Daily, Nicolas Zafar MD, 0.6 mg at 06/29/20 0906    clopidogrel (PLAVIX) tablet 75 mg, 75 mg, Oral, Daily, Ashley Verma MD, 75 mg at 06/29/20 2074    potassium chloride (KLOR-CON M) extended release tablet 20 mEq, 20 mEq, Oral, BID WC, Ashley Verma MD, 20 mEq at 06/29/20 0908    calcium carbonate (TUMS) chewable tablet 500 mg, 500 mg, Oral, BID PRN, Ashley Verma MD, 500 mg at 06/17/20 0127    allopurinol (ZYLOPRIM) tablet 100 mg, 100 mg, Oral, Daily, Ashley Verma MD, 100 mg at 06/29/20 0503    predniSONE (DELTASONE) tablet 5 mg, 5 mg, Oral, Daily, Ashley Verma MD, 5 mg at 06/28/20 0748    glipiZIDE (GLUCOTROL) tablet 5 mg, 5 mg, Oral, Ashley GALO MD, 5 mg at 06/29/20 0622    pantoprazole (PROTONIX) tablet 40 mg, 40 mg, Oral, Ashley GALO MD, 40 mg at 06/29/20 0622    vitamin D (ERGOCALCIFEROL) capsule 50,000 Units, 50,000 Units, Oral, Once per day on [place] Cho MD, 50,000 Units at 06/24/20 0909    sacubitril-valsartan (ENTRESTO) 24-26 MG per tablet 1 tablet, 1 tablet, Oral, BID, Ashley Verma MD, 1 tablet at 06/29/20 0907    acetaminophen (TYLENOL) tablet 650 mg, 650 mg, Oral, Q4H PRN, Ashley Verma MD, 650 mg at 06/29/20 1246    glucose (GLUTOSE) 40 % oral gel 15 g, 15 g, Oral, PRN, Ashley Verma MD    dextrose 50 % IV solution, 12.5 g, Intravenous, PRN, Ashley Verma MD    glucagon (rDNA) injection 1 mg, 1 mg, Intramuscular, PRN, Ashley Verma MD    dextrose 5 % solution, 100 mL/hr, Intravenous, PRN, Ashley Verma MD    Allergies:  Iodine and Iv dye [iodides]    Social History     Socioeconomic History    Marital status:       Spouse name: Not on file    Number of children: Not on file    Years of education: Not on file    Highest education level: Not on file   Occupational History    Not on file   Social Needs    Financial resource strain: Not on file    Food insecurity     Worry: Not on file     Inability: Not on file    Transportation needs     Medical: Not on file     Non-medical: Not on file   Tobacco Use    Smoking status: Never Smoker    Smokeless tobacco: Never Used   Substance and Sexual Activity    Alcohol use: Yes     Comment: rare wine;  drinks 1 cup of coffee daily;  occas hot tea    Drug use: No    Sexual activity: Not on file   Lifestyle    Physical activity     Days per week: Not on file     Minutes per session: Not on file    Stress: Not on file   Relationships    Social connections     Talks on phone: Not on file     Gets together: Not on file     Attends Congregational service: Not on file     Active member of club or organization: Not on file     Attends meetings of clubs or organizations: Not on file     Relationship status: Not on file    Intimate partner violence     Fear of current or ex partner: Not on file     Emotionally abused: Not on file     Physically abused: Not on file     Forced sexual activity: Not on file   Other Topics Concern    Not on file   Social History Narrative    Not on file        No family history on file. REVIEW OF SYSTEMS:    Gen: Negative for nausea, vomiting, diarrhea, fever, chills, night sweats, no weight loss or weight gain  HEENT: Hard of hearing and wears hearing aids negative for double vision, blurred vision, sore throat   Heart: Negative for HTN, palpitations, chest pain, or pain radiating to the arm, jaw or teeth  Lungs: Negative for wheezes, shortness of breath while at rest or lying down  GI: Negative for nausea, vomiting, diarrhea, or constipation  : Negative for dysuria, hematuria, increased frequency or urgency  Endo: Negative for polydipsia, polyuria, or heat or cold intolerances.   Heme: Negative leg swelling, blood or bleeding disorders  Psych: Negative for Depression or anxiety  Ortho: Please see history of present illness vascular: See history of present illness    PHYSICAL EXAM:    Vitals:    06/29/20 0906   BP: 138/74   Pulse: 90   Resp: 16   Temp: 97.8 °F (36.6 °C)   SpO2:      GENERAL APPEARANCE:  Well-developed well-nourished alert and oriented answers questions appropriately the patient does not appear in any acute distress. HEAD: Head is normocephalic atraumatic, with Normal range of motion. EYES: Inspection of the conjunctiva and lids demonstrated no abnormalities, no jaundice, no scleral icterus, PERRL, EOMI, and vision are grossly intact. EARS: Bilateral hearing aids are in place external auditory canals demonstrate no abnormalities. Ears are well set hearing is grossly intact. SKIN: The left fifth and fourth digit demonstrate some mild ecchymosis. There is no ulcerations that I could appreciate over the left or right extremity. Bilateral pitting edema is noted. Left greater than right normal in color, texture, and turgor, no visible lesions, no jaundice. NECK: Supple, nontender no lymphadenopathy trachea is midline no jugular venous distention no carotid bruits auscultated. LUNGS:  Clear to auscultation bilaterally no wheezes rales or rhonchi good respiratory changes noted. CARDIOVASCULAR: Currently regular rate and rhythm no murmur rub or gallop that I could appreciate. ABDOMEN: Soft nontender no rebound or guarding, positive bowel sounds no pulsatile abdominal masses. No organosplenomegaly that I can appreciate. EXTREMITIES: Weakly palpable brachial pulses are noted. Radials are difficult to feel. Motor and sensation are intact capillary refills intact bilaterally in the upper extremities. Lower extremities demonstrate difficult to feel femoral pulses. She has monophasic DP and PT signals bilaterally. See skin examination  MUSKULOSKELETAL  adequately aligned spine, range of motion appears to be intact with regards to the spine and upper and lower extremities. No joint tenderness or erythema. Normal muscular development. NEURO: Cranial nerves II through XII grossly intact. Strength and sensation are symmetric and intact throughout.   Psychiatric: Mental examination revealed the patient was oriented to person, place, and time. The patient was able to demonstrate adequate judgment and reason, without any hallucinations abnormal affect or abnormal behavior on today's exam.      LABS:    Lab Results   Component Value Date    WBC 5.5 06/26/2020    HGB 9.7 (L) 06/26/2020    HCT 32.1 (L) 06/26/2020     06/26/2020    PROTIME 13.4 (H) 06/17/2020    INR 1.2 06/17/2020    K 4.7 06/26/2020    BUN 21 06/26/2020    CREATININE 1.3 (H) 06/26/2020       RADIOLOGY:  VL LOWER EXTREMITY ARTERIAL SEGMENTAL PRESSURES W PPG BILATERAL   Final Result      XR FOOT LEFT (2 VIEWS)   Final Result   There are degenerative changes of the interphalangeal joints and   diffuse osteopenia without definite evidence of fracture or bony   misalignment. There is some generalized soft tissue swelling about the entire foot   and diabetic-type arterial calcifications which are nonspecific. Vague   hypodensity in the region of the ankle on the DP view is of   questionable significance, and could be clothing artifact. Physical   examination to exclude soft tissue gas cephalad to the ankle is   recommended. ALERT:  THIS IS AN ABNORMAL REPORT-low-density soft tissue appearance   along the distal tibia just proximal to the ankle is noted at the   margin of the study on 1 projection, and clinical evaluation to   exclude gas and confirmed clothing artifact is recommended. US DUP LOWER EXTREMITY LEFT ORLIN   Final Result      No evidence to suggest deep venous thrombosis of the left lower   extremity.                    IMPRESSION:   Active Hospital Problems    Diagnosis    Closed left hip fracture, initial encounter ([facility] [address] [S72.002A]    Essential hypertension [I10]    Mixed hyperlipidemia [E78.2]    CKD (chronic kidney disease) stage 3, GFR 30-59 ml/min (HCC) [N18.3]    Type 2 diabetes mellitus with complication, without long-term current use of insulin ([facility] [address] [E11.8]    H/O: gout [Z87.39]       PLAN: #1 history of peripheral vascular disease prior MARY ELLEN is 0.48 on the right and 0.8 on the left. She has dampened digital waveforms more so on the right than the left. She has no foot pain or discomfort. Her main complaint is leg swelling. She has had a prior ultrasound demonstrating no evidence of deep venous thrombosis. Secondary to worsening leg swelling and concern for deep venous thrombosis secondary her current medical condition and recent hip fracture we will repeat the ultrasound examination. This was discussed with the family. As far as her peripheral vascular disease is concerned she has no current rest pain in her no active ulcerations. She has been seen in the past by Dr. Moody Everett I will refer her back to Dr. Moody Everett for any additional treatment if needed. She is also been evaluated by the [facility] clinic and the family believes at some point she may have had an intervention done with regards to her arterial disease.         Electronically signed by Areli Hernandez MD on 6/29/2020 at 2:56 PM

## 2020-06-29 NOTE — PROGRESS NOTES
[facility]  Comprehensive Progress Note    Subjective:      Jazzmine Saavedra is a 80 y.o. female admitted to inpatient rehabilitation for impairments and activities limitations in ADLs and mobility secondary to left hip fracture, s/p closed reduction and cephalomedullary nail fixation . No acute events overnight. No cp, sob, n/v.  Patient is tolerating therapy program, progressing. She reports post op pain which is adequately controlled. She previously complained of generalized leg pains, but does not report today. No new complaints. The patient's medical records have been reviewed. Scheduled Meds:polyethylene glycol, 17 g, Daily  metoclopramide, 2.5 mg, TID AC  docusate sodium, 100 mg, BID  senna, 1 tablet, Nightly  aspirin, 81 mg, Daily  carvedilol, 6.25 mg, BID WC  bumetanide, 1 mg, Daily  colchicine, 0.6 mg, Daily  clopidogrel, 75 mg, Daily  potassium chloride, 20 mEq, BID WC  allopurinol, 100 mg, Daily  predniSONE, 5 mg, Daily  glipiZIDE, 5 mg, QAM AC  pantoprazole, 40 mg, QAM AC  vitamin D, 50,000 Units, Once per day on Wed  sacubitril-valsartan, 1 tablet, BID      Continuous Infusions:   dextrose       PRN Meds:lactulose, 20 g, Daily PRN  hydrALAZINE, 25 mg, Q6H PRN  traMADol, 50 mg, Q6H PRN  calcium carbonate, 500 mg, BID PRN  acetaminophen, 650 mg, Q4H PRN  glucose, 15 g, PRN  dextrose, 12.5 g, PRN  glucagon (rDNA), 1 mg, PRN  dextrose, 100 mL/hr, PRN         Objective:      Vitals:    06/28/20 1533 06/28/20 2107 06/29/20 0055 06/29/20 0906   BP: 134/66 (!) 140/70 (!) 146/74 138/74   Pulse: 88 94 92 90   Resp:    16   Temp:    97.8 °F (36.6 °C)   TempSrc:    Temporal   SpO2:   96%    Weight:       Height:           General appearance: Alert, NAD, up in w/c  Lungs: CTA b/l, no w/r/r  Heart: RRR  Abdomen: soft, non-tender, normal bowel sounds  Extremities: LLE ROM impaired, hip precautions. Mild BLE edema,  no calf tenderness.  Good capillary refill BLE  Skin:    L hip surgical incision c/d/i   Neurologic: Alert, oriented. Strength 5/5 BUE. Strength 3-4/5 b/l HF and left KE, 4/5 right KE, bilateral DF/PF. Laboratory:    Lab Results   Component Value Date    WBC 5.5 06/26/2020    HGB 9.7 (L) 06/26/2020    HCT 32.1 (L) 06/26/2020    MCV 87.7 06/26/2020     06/26/2020     Lab Results   Component Value Date     06/26/2020    K 4.7 06/26/2020     06/26/2020    CO2 23 06/26/2020    BUN 21 06/26/2020    CREATININE 1.3 06/26/2020    GLUCOSE 107 06/26/2020    CALCIUM 8.6 06/26/2020      Lab Results   Component Value Date    ALT 17 02/20/2020    AST 24 02/20/2020    ALKPHOS 99 02/20/2020    BILITOT 0.7 02/20/2020       Lab Results   Component Value Date    COLORU YELLOW 12/08/2016    GLUCOSEU NEG 12/08/2016    KETUA NEG 12/08/2016    BILIRUBINUR NEG 12/08/2016     Lab Results   Component Value Date    LABA1C 7.7 (H) 11/21/2019       Functional Status:   Physical Therapy:  Bed mobility: Supervision  Transfers: SBA  Ambulation: 20-40 ft [employer] SBA     Occupational Therapy:  Feeding: Mod I  Grooming: Set up  UB dressing: Min A  LB dressing: Mod A     Assessment/Plan:     80 y.o. female admitted to inpatient rehabilitation for impairments and activities limitations in ADLs and mobility secondary to left hip fracture, s/p closed reduction and cephalomedullary nail fixation .    -Intertrochanteric fracture of the left femur: s/p  left hip closed reduction and cephalomedullary nail fixation on 6/4/2020 by Dr. Maged Obregon. WBAT LLE. Continue acute rehab program.  -Acute pain: Tramadol or tylenol PRN  -Acute blood loss anemia: received 2 units pRBC at [facility] (6/5, 6/7). H&H improved post transfusion.  Monitor H&H--improving.   --Aspiration pneumonia: seen by ID at OSH, treated with Unasyn then switched to oral Levaquin and Augmentin to complete course (stop date 6/19/2020)--RESOLVED  -DM: continue glipizide, BG adequately controlled, monitor  -HTN: continue home medications, monitor  -CKD- baseline Cr appears to be 1.4. --Cr at baseline, monitor  -DVT prophylaxis: Home dose Aspirin and Plavix per Ortho recommendations. Podiatry notes appreciated. Vascular studies reviewed. Vascular surgery consulted for PAD and Dr. Josh Reyes to see this afternoon.     Anticipate discharge on Thursday    Electronically signed by Karyle Reilly, MD on 6/29/2020 at 1:50 PM

## 2020-06-29 NOTE — PROGRESS NOTES
Occupational Therapy  OCCUPATIONAL THERAPY DAILY NOTE    Date:2020  Patient Name: Hetal Abraham  MRN: [id number]  : 1927  Room: [address] [place] [address]     Diagnosis:s/p fall- L hip fx- s/p closed reduction cephalomedullary nail fixation   Additional Pertinent Hx: DM, HTN, HLD, CHF, CKD, hx BTKR, arthritis    Precautions: falls,WBAT Guru Whittington has hearing aides    Functional Assessment:   Date Status AE  Comments   Feeding 20  Mod I      Grooming 20  Set up   Seated at sink pt completed oral and hair care    Bathing 20  UB- set up   LB- [address] completed in shower    UB Dressing 20  Minimal Assist   Pt donned bra and pull over shirt. Pt needed assist to fasten bra    LB Dressing 20  Mod A Reacher, sock aid ,long shoe horn  Pt donned depends and pants with reacher. Pt declined using sock aid. Therapist donned socks and shoes. CGA to stand and pull up pants    Homemaking 20  SBA ww      Functional Transfers / Balance:   Date Status DME  Comments   Sit Balance 20  Supervision      Stand Balance 20  CGA/SBA  ww    [] Tub  [x] Shower   Transfer 20  Min A  Ww, shower seat     Commode   Transfer      toileting      20  CGA         Min  Assist  Ww, grab rail     Functional   Mobility 20  SBA  ww Back and forth to the bathroom    Other: w/c <>bed         Sit to stand from w/c to ww         On/off recliner  20    SBA         SBA            Minimal Assist  Ww,using leg          ww         ww      Functional Exercises / Activity:  BUE strength exercises : 2 # weighted ball 3 sets 10 reps in all planes                                             Green can do bar 3 sets 15 reps   B hand strength with 7 # digi flex 3 sets 15 reps     Sensory / Neuromuscular Re-Education:      Cognitive Skills:   Status Comments   Problem   Solving fair  During transfers and exercises.     Memory fair  \"   Sequencing fair  \" Safety fair  \"     Visual Perception:    Education:  Pt was educated on use of AE to increase level of independence with LB dressing. Pt frequently declines wanting to use the AE and reports that her family will assist her as needed at discharge     [x] Family teach completed on:   6/25/20: Completed family teach this session. Pt's daughter present. Educated daughter on pt's status with ADLs  and vc's for sequencing/initiation of tasks/hand placement with functional transfers. Daughter made aware of pt's declined of AE, reporting that she will assist pt with ADLs. Educated on energy conservation techniques and DME. Educated on removal of throw rugs. Daughter reports that she does not believe a BSC will fit next to pt's bed for nighttime use. Daughter reported that the patient's granddaughter will be at home when daughter is at work. Therapist advised for granddaughter to come in for further family teach to complete hands on assist with ADLs/functional transfers with pt. Pt able to complete functional transfers on hard floor/carpeting, sofa/dining area/bed/walk in shower transfers. Daughter did not complete hands on with transfers and only observed this session. She was educated on body mechanics/hand placement/vc's to provide for pt to increase safety with functional transfers/ADLs. Educated on gait belt. Pt's limiting factor appears to be her activity tolerance. Daughter and pt demo'ing F+ with understanding of education. Pain Level: LLE 8 /10     Additional Notes:   6/25/20: leg  given to pt    Patient has made fair  progress during treatment sessions toward set goals.  Therapy emphasis to obtain goals:Current Treatment Recommendations: Strengthening, Positioning, Gait Training, Safety Education & Training, Balance Training, Patient/Caregiver Education & Training, Self-Care / ADL, Functional Mobility Training, Equipment Evaluation, Education, & procurement, Home Management Training, Endurance Training [x] Continue with current OT Plan of care.   [] Prepare for Discharge     DISCHARGE RECOMMENDATIONS  Recommended DME:    Post Discharge Care:   []Home Independently  [x]Home with 24hr Care / Supervision []Home with Partial Supervision []Home with Home Health OT []Home with Out Pt OT []Other: ___   Comments:         Time in Time out Tx Time Breakdown  Variance:   First Session  8307 0915  [x] Individual Tx- 60   [] Concurrent Tx -  [] Co-Tx -   [] Group Tx -   [] Time Missed -     Second Session 1000 1030  [x] Individual Tx-30   [] Concurrent Tx   [] Co-Tx -   [] Group Tx -   [] Time Missed -            Third Session    [] Individual Tx-   [] Concurrent Tx -  [] Co-Tx -   [] Group Tx -   [] Time Missed -         Total Tx Time:  90 mins        [employer] OTR/L [id number]

## 2020-06-29 NOTE — PLAN OF CARE
Problem: Pain:  Goal: Pain level will decrease  Description: Pain level will decrease  Outcome: Met This Shift  Goal: Control of acute pain  Description: Control of acute pain  Outcome: Met This Shift  Goal: Control of chronic pain  Description: Control of chronic pain  Outcome: Met This Shift     Problem: Falls - Risk of:  Goal: Will remain free from falls  Description: Will remain free from falls  Outcome: Met This Shift  Goal: Absence of physical injury  Description: Absence of physical injury  Outcome: Met This Shift     Problem: SAFETY  Goal: LTG - patient will adhere to hip precautions during ADL's and transfers  Outcome: Met This Shift  Goal: LTG - patient will utilize safety techniques  Outcome: Met This Shift  Goal: STG - patient locks brakes on wheelchair  Outcome: Met This Shift  Goal: STG - Patient uses call light consistently to request assistance with transfers  Outcome: Met This Shift  Goal: Free from accidental physical injury  Outcome: Met This Shift     Problem: DAILY CARE  Goal: Daily care needs are met  Outcome: Met This Shift     Problem: SKIN INTEGRITY  Goal: Skin integrity is maintained or improved  Outcome: Met This Shift     Problem: KNOWLEDGE DEFICIT  Goal: Patient/S.O. demonstrates understanding of disease process, treatment plan, medications, and discharge instructions.   Outcome: Met This Shift

## 2020-06-29 NOTE — PROGRESS NOTES
Called office for consult with Dr. Gregorio Carbajal, informed that [employer] would best. [employer] message sent.  Jeff Heath RN

## 2020-06-29 NOTE — PROGRESS NOTES
Physical Therapy  Treatment Note  Supervising Therapist: Stanislav Matt. Benja Forrester, [id number]    NAME: Prince Oneil  ROOM: [address]E  DIAGNOSIS: Closed L hip fracture  PRECAUTIONS: Falls, WBAT LLE, [place], L post-op shoe  PROCEDURE(S): L hip closed reduction and cephalomedullary nail fixation 6/4/20  HPI: Pt suffered a fall at home when reaching towards the floor to adjust a rug. Pt was admitted to ED and had the above procedure performed. Significant PMHX of DM, HTN, arthritis, and CHF. Social:  Pt lives with her daughter in a ranch style floor plan with 2 steps and  1 rail (L side going up) to enter. Prior to admission pt ambulated with [employer] independently. Pt has 5 children who are local.     Initial Evaluation  6/17/20 AM     PM    Short Term Goals Long Term Goals    Was pt agreeable to Eval/treatment? yes Yes Yes     Does pt have pain? 7/10 L knee pain L hip pain with activity L hip pain with activity     Bed Mobility  Rolling: Mod A  Supine to sit: Mod A  Sit to supine: Mod A  Scooting: Mod A Rolling: Supervision  Supine<>sit: Supervision  Scooting: Supervision  (leg , no shoes) NT SBA Supervision   Transfers Sit to stand: Mod A  Stand to sit: Mod A  Stand pivot: Mod A with [employer] Sit to stand: SBA/Supervision  Stand to sit: SBA  Stand pivot: SBA with [employer] Sit to stand: SBA/Supervision  Stand to sit: SBA  Stand pivot: SBA with [employer] Min A SBA   Ambulation   10 feet with [employer] with Mod A 25 feet with [employer] with SBA/Supervision 20 feet with [employer] with SBA/Supervision 50 feet with [employer] with Min A 75 feet with WW with SBA   Walking 10 feet on uneven surface NT NT NT 10 feet with [employer] with Min A 10 feet with [employer] with SBA   Wheel Chair Mobility NT NT NT     Car Transfers NT NT NT Min A SBA   Stair negotiation: ascended and descended Attempted, unable to complete.  Completed 2 inch step in parallel bars with backward descending 2 steps with 1 rail with R HHA with Min A 2 steps with 1 rail + R HHA with Min A 2 steps with 2 rails with Min A 4

## 2020-06-29 NOTE — PROGRESS NOTES
Foot and Ankle Surgery note  Subjective:  Patient seen. NAD. No overnight events. SOB:no  Fever: no  Chills: no  Chest Pain: no  Bowel Movement:  no  Urination: no  Calf Pain: no    Focused Lower Extremity Physical Exam:  Vitals:    06/29/20 1601   BP: 108/60   Pulse: 90   Resp:    Temp:    SpO2:       NEUROLOGIC:  Protective sensation absent to bilateral lower extremities. Epicritic sensation is otherwise intact.     VASCULAR:  DP and PT pulses are palpable. Biphasic ultrasound signals  noted for the left side secondary to the edema. There is edema noted in  the left side and entire left lower extremity. Skin temperature is warm  to cool, proximal and distal.  Capillary refill is brisk at the toes.     DERMATOLOGIC:  Absent hair growth noted bilaterally. No  hyperpigmentation seen at this time. Nonpitting edema noted to the left  foot and leg. There is mild ecchymosis noted to the distal third toe  and the left great toe as well too. No signs of infection. There is no  cellulitis, lymphangitis, or lymphadenopathy noted at this time. Toenails 1 through 5 bilaterally thick, discolored.     MUSCULOSKELETAL:  Pain on palpation to the left mid foot area. Range of  motion in the first MTP joint and the lesser MTP joint limited, but  there is pain noted on range of motion. No pain on palpation to the  plantar foot. Most of the pain on palpation is to the central  metatarsal bones at this time; however, there is no clinical evidence of  any instability.     Laboratory:    CBC with Differential:    Lab Results   Component Value Date    WBC 5.5 06/26/2020    RBC 3.66 06/26/2020    HGB 9.7 06/26/2020    HCT 32.1 06/26/2020     06/26/2020    MCV 87.7 06/26/2020    MCH 26.5 06/26/2020    MCHC 30.2 06/26/2020    RDW 18.1 06/26/2020    LYMPHOPCT 9.8 06/17/2020    MONOPCT 9.2 06/17/2020    EOSPCT 1.2 02/20/2020    BASOPCT 0.1 06/17/2020    MONOSABS 0.89 06/17/2020    LYMPHSABS 0.95 06/17/2020    EOSABS 0.04 06/17/2020    NAHUN 0.01 06/17/2020        Assessment:  1. Left foot pain. 2.  Left foot contusion. 3.  Onychomycosis. 4. PAD. 5.  Insulin-dependent diabetes mellitus with neuropathy. Plan:  X-rays reviewed. No infectious process. Continue surgical shoe. No plans for invasive intervention. Appreciate vascular surgery consultation.     Dandre Little DPM  Fellowship-Trained Foot and Ankle Surgeon  [phone]        Electronically signed by Shimon Lund DPM on 6/29/2020 at 5:43 PM

## 2020-06-30 NOTE — PROGRESS NOTES
Spoke with RN, Jeremy Gordon, and informed her that we will be performing pt's ultrasound tomorrow (6/30).

## 2020-06-30 NOTE — PROGRESS NOTES
Occupational Therapy  OCCUPATIONAL THERAPY DAILY NOTE    Date:2020  Patient Name: Christofer De La Paz  MRN: [id number]  : 1927  Room: [address] [place] [address]     Diagnosis:s/p fall- L hip fx- s/p closed reduction cephalomedullary nail fixation   Additional Pertinent Hx: DM, HTN, HLD, CHF, CKD, hx BTKR, arthritis    Precautions: falls,WBAT Claudell Ahumada has hearing aides    Functional Assessment:   Date Status AE  Comments   Feeding 20  Mod I      Grooming 20  Set up      Bathing 20  UB- set up   LB- Min A      UB Dressing 20  Minimal Assist      LB Dressing 20  Mod A Reacher, sock aid ,long shoe horn     Homemaking 20  SBA ww      Functional Transfers / Balance:   Date Status DME  Comments   Sit Balance 20  Supervision      Stand Balance 20  CGA/SBA  ww    [] Tub  [x] Shower   Transfer 20  Min A  Ww, shower seat     Commode   Transfer      toileting      20  CGA         Min  Assist  Ww, grab rail     Functional   Mobility 20  SBA  ww    Other: w/c <>bed         Sit to stand from w/c to ww           On/off recliner and kitchen chair with arm rests  20   SBA           SBA              CGA            Ww,using leg          ww           ww Pt needed encouragement to user leg                    Assist needed with sit to stand off of lower chair surfaces      Functional Exercises / Activity:  BUE strength exercises : arm bike 5 mins for 3 reps                                             Thin red can do bar 3 sets 10 reps     BUE ROM/strength exercise with pt completing ROM shoulder arc with 1/2 wrist cuff weights on wrists     Sensory / Neuromuscular Re-Education:      Cognitive Skills:   Status Comments   Problem   Solving fair  During transfers and exercises.     Memory fair  \"   Sequencing fair  \"   Safety fair  \"     Visual Perception:    Education:  Pt was educated on ways to increase surface height of furniture  to aid in  increasing level of independence with sit to stand transfers     [x] Family teach completed on:   6/25/20: Completed family teach this session. Pt's daughter present. Educated daughter on pt's status with ADLs  and vc's for sequencing/initiation of tasks/hand placement with functional transfers. Daughter made aware of pt's declined of AE, reporting that she will assist pt with ADLs. Educated on energy conservation techniques and DME. Educated on removal of throw rugs. Daughter reports that she does not believe a BSC will fit next to pt's bed for nighttime use. Daughter reported that the patient's granddaughter will be at home when daughter is at work. Therapist advised for granddaughter to come in for further family teach to complete hands on assist with ADLs/functional transfers with pt. Pt able to complete functional transfers on hard floor/carpeting, sofa/dining area/bed/walk in shower transfers. Daughter did not complete hands on with transfers and only observed this session. She was educated on body mechanics/hand placement/vc's to provide for pt to increase safety with functional transfers/ADLs. Educated on gait belt. Pt's limiting factor appears to be her activity tolerance. Daughter and pt demo'ing F+ with understanding of education. Pain Level: LLE 8 /10     Additional Notes:   6/25/20: leg  given to pt    Patient has made fair  progress during treatment sessions toward set goals. Therapy emphasis to obtain goals:Current Treatment Recommendations: Strengthening, Positioning, Gait Training, Safety Education & Training, Balance Training, Patient/Caregiver Education & Training, Self-Care / ADL, Functional Mobility Training, Equipment Evaluation, Education, & procurement, Home Management Training, Endurance Training      [x] Continue with current OT Plan of care.   [] Prepare for Discharge     DISCHARGE RECOMMENDATIONS  Recommended DME:    Post Discharge Care:   []Home Independently  [x]Home with 24hr Care / Supervision []Home with Partial Supervision []Home with Home Health OT []Home with Out Pt OT []Other: ___   Comments:         Time in Time out Tx Time Breakdown  Variance:   First Session  7445  4289 [x] Individual Tx- 45  [] Concurrent Tx -  [] Co-Tx -   [] Group Tx -   [] Time Missed -     Second Session 1964  9947 [x] Individual Tx- 30   [] Concurrent Tx   [] Co-Tx -   [] Group Tx -   [x] Time Missed -15          Pt fatigue and requesting to lay down in bed          Third Session    [] Individual Tx-   [] Concurrent Tx -  [] Co-Tx -   [] Group Tx -   [] Time Missed -         Total Tx Time: 75 mins        [employer]arden Noe OTR/L [id number]

## 2020-06-30 NOTE — PROGRESS NOTES
Vascular Surgery Progress Note    Pt is being seen in f/u today regarding lower extremity leg swelling    Subjective:  Estella Levin is a 80 y.o. female patient with a history of lower extremity leg swelling. Left greater than right. Overall she is doing okay. She denies any chest pain shortness of breath or discomfort. She denies any motor sensation loss. She is extremely hard of hearing. Current Medications:    dextrose        lactulose, hydrALAZINE, traMADol, calcium carbonate, acetaminophen, glucose, dextrose, glucagon (rDNA), dextrose    polyethylene glycol  17 g Oral Daily    metoclopramide  2.5 mg Oral TID AC    docusate sodium  100 mg Oral BID    senna  1 tablet Oral Nightly    aspirin  81 mg Oral Daily    carvedilol  6.25 mg Oral BID WC    bumetanide  1 mg Oral Daily    colchicine  0.6 mg Oral Daily    clopidogrel  75 mg Oral Daily    potassium chloride  20 mEq Oral BID WC    allopurinol  100 mg Oral Daily    predniSONE  5 mg Oral Daily    glipiZIDE  5 mg Oral QAM AC    pantoprazole  40 mg Oral QAM AC    vitamin D  50,000 Units Oral Once per day on Wed    sacubitril-valsartan  1 tablet Oral BID        PHYSICAL EXAM:    /75   Pulse 84   Temp 98.4 °F (36.9 °C) (Oral)   Resp 16   Ht 5' 3\" (1.6 m)   Wt 163 lb (73.9 kg)   SpO2 97%   BMI 28.87 kg/m²     Intake/Output Summary (Last 24 hours) at 6/30/2020 1154  Last data filed at 6/30/2020 0730  Gross per 24 hour   Intake 1140 ml   Output --   Net 1140 ml          General: Alert and oriented answers questions appropriately no acute distress  Skin: Bilateral lower extremity leg swelling left greater than right. No signs of tissue loss  HEENT: Normocephalic atraumatic trach is midline no jugular venous distention  CVS: Currently regular rate and rhythm no murmur rub or gallop  Resp: Clear to auscultation bilaterally no wheezes rales or rhonchi  Abd: Soft nontender no rebound or guarding.   Positive bowel sounds  Extremities: Motor and sensation are intact in lower extremities. Leg swelling is unchanged from yesterday. Neuro: Grossly intact bilateral any gross cranial nerve deficit    LABS:    Lab Results   Component Value Date    WBC 5.5 06/26/2020    HGB 9.7 (L) 06/26/2020    HCT 32.1 (L) 06/26/2020     06/26/2020    PROTIME 13.4 (H) 06/17/2020    INR 1.2 06/17/2020    K 4.7 06/26/2020    BUN 21 06/26/2020    CREATININE 1.3 (H) 06/26/2020       RADIOLOGY:  US DUP LOWER EXTREMITIES BILATERAL VENOUS   Final Result   No evidence of deep venous thrombosis. VL LOWER EXTREMITY ARTERIAL SEGMENTAL PRESSURES W PPG BILATERAL   Final Result      XR FOOT LEFT (2 VIEWS)   Final Result   There are degenerative changes of the interphalangeal joints and   diffuse osteopenia without definite evidence of fracture or bony   misalignment. There is some generalized soft tissue swelling about the entire foot   and diabetic-type arterial calcifications which are nonspecific. Vague   hypodensity in the region of the ankle on the DP view is of   questionable significance, and could be clothing artifact. Physical   examination to exclude soft tissue gas cephalad to the ankle is   recommended. ALERT:  THIS IS AN ABNORMAL REPORT-low-density soft tissue appearance   along the distal tibia just proximal to the ankle is noted at the   margin of the study on 1 projection, and clinical evaluation to   exclude gas and confirmed clothing artifact is recommended. US DUP LOWER EXTREMITY LEFT ORLIN   Final Result      No evidence to suggest deep venous thrombosis of the left lower   extremity. ASSESSMENT/PLAN:   · #1 lower extremity leg swelling. She is had a prior fall. This may all be related to her recent fall and injury. You ultrasound demonstrated no evidence of deep venous thrombosis. She has known peripheral vascular disease but no wounds currently.   From our standpoint no plans for any additional intervention. We will sign off she can follow-up with us in 3 months.         Electronically signed by Shivam Peña MD on 6/30/2020 at 11:54 AM

## 2020-06-30 NOTE — PROGRESS NOTES
Physical Therapy  Treatment Note  Supervising Therapist: Sesar Simpson. Roelralph Magdaleno, [id number]    NAME: Jessica Swartz  ROOM: [id number]  DIAGNOSIS: Closed L hip fracture  PRECAUTIONS: Falls, WBAT LLE, [place], L post-op shoe  PROCEDURE(S): L hip closed reduction and cephalomedullary nail fixation 6/4/20  HPI: Pt suffered a fall at home when reaching towards the floor to adjust a rug. Pt was admitted to ED and had the above procedure performed. Significant PMHX of DM, HTN, arthritis, and CHF. Social:  Pt lives with her daughter in a ranch style floor plan with 2 steps and  1 rail (L side going up) to enter. Prior to admission pt ambulated with [employer] independently. Pt has 5 children who are local.     Initial Evaluation  6/17/20 AM     PM    Short Term Goals Long Term Goals    Was pt agreeable to Eval/treatment? yes Yes Yes     Does pt have pain? 7/10 L knee pain L hip pain with activity L hip pain with activity     Bed Mobility  Rolling: Mod A  Supine to sit: Mod A  Sit to supine: Mod A  Scooting: Mod A Rolling: Supervision  Supine<>sit: Supervision  Scooting: Supervision  (leg , no shoes) NT SBA Supervision   Transfers Sit to stand: Mod A  Stand to sit: Mod A  Stand pivot: Mod A with [employer] Sit to stand: SBA/Supervision  Stand to sit: SBA  Stand pivot: SBA with [employer] Sit to stand: SBA/Supervision  Stand to sit: SBA  Stand pivot: SBA with [employer] Min A SBA   Ambulation   10 feet with [employer] with Mod A 15 feet with [employer] with SBA/Supervision 30 feet with [employer] with SBA/Supervision 50 feet with [employer] with Min A 75 feet with [employer] with SBA   Walking 10 feet on uneven surface NT NT 10 feet with [employer] with Min A 10 feet with [employer] with Min A 10 feet with WW with SBA   Wheel Chair Mobility NT  feet with BUE with SBA  >150 feet with BUE with Modified [place]   Car Transfers NT NT NT Min A SBA   Stair negotiation: ascended and descended Attempted, unable to complete.  Completed 2 inch step in parallel bars with backward descending 3 steps with 1 rail + R HHA with Min A 3 steps with 1 rail + R HHA with Min A 2 steps with 2 rails with Min A 4 steps with 2 rails with SBA   Curb Step:   ascended and descended NT NT 2 inch step with WW with SBA     Picking up object off the floor NT NT Picked up a cone with SBA     BLE ROM [facility] WFL      BLE Strength RLE: 4-/5  L hip: 3-/5  L knee/ankle: 3+/5 RLE: 4-/5  L hip: 3-/5  L knee/ankle: 3+/5      Balance  Sitting: SBA  Standing: Mod A with [employer] Sitting: Supervision  Standing: SBA with [employer]      Date Family Teach Completed TBA Pt's granddaughter present for hands-on 6/17, daughter Garrick Robles present for hands-on 6/23, daughter Cande Cochran present 6/25 for hands-on      Is additional Family Teaching Needed? Y or N Y Yes      Hindering Progress Pain, weakness Pain, weakness      PT recommended ELOS 2-3 weeks       Team's Discharge Plan        Therapist at Team Meeting          Therapeutic Exercise:   AM:   L hip flexion with heel slides in supine AAROM (pt assisted with sheet) progressing to AROM  Supine PROM to LLE to include:  -HS stretching 2x30 seconds  -Heel cord stretching 2x30 seconds  -PROM progressing to AAROM to L hip/knee joints  Supine LLE Manual Resisted Exercises:   -Hip/knee extension x15  -Hip/knee flexion x15  Ambulation: 2x15 feet with WW with SBA/Supervision  PM:   Ambulation: 2x30 feet with [employer] with SBA/Supervision  Stair negotiation: 2x3 steps with 1 rail + R HHA with Min A (used railing for final step as she fatigued)    Additional Comments: The pt continues to report L hip pain with all activity and requires encouragement to perform activities but is improving her confidence with the tasks and has shown steady progression. The pt still requires cues for sequencing for bed mobility and stair negotiation as well as encouragement to stand unassisted from her WC as she fatigues. The pt was able to participate well this morning, she is anticipating discharge on Thursday.  The pt was able to negotiate 3 steps with HHA during

## 2020-06-30 NOTE — PROGRESS NOTES
06/17/2020    EOSABS 0.04 06/17/2020    BASOSABS 0.01 06/17/2020        Assessment:  1. Left foot pain. 2.  Left foot contusion. 3.  Onychomycosis. 4. PAD. 5.  Insulin-dependent diabetes mellitus with neuropathy. Plan:  X-rays reviewed. No infectious process. Continue surgical shoe. No plans for invasive intervention. Appreciate vascular surgery consultation. No plans for intervention. Will follow her as outpatient on discharge.     Richard Hairston DPM  Fellowship-Trained Foot and Ankle Surgeon  [phone]        Electronically signed by Stephie Dominguez DPM on 6/30/2020 at 12:23 PM

## 2020-06-30 NOTE — PLAN OF CARE
Problem: Pain:  Goal: Pain level will decrease  Description: Pain level will decrease  Outcome: Met This Shift  Goal: Control of acute pain  Description: Control of acute pain  Outcome: Met This Shift  Goal: Control of chronic pain  Description: Control of chronic pain  Outcome: Met This Shift     Problem: Falls - Risk of:  Goal: Will remain free from falls  Description: Will remain free from falls  Outcome: Met This Shift  Goal: Absence of physical injury  Description: Absence of physical injury  Outcome: Met This Shift     Problem: SAFETY  Goal: LTG - patient will adhere to hip precautions during ADL's and transfers  Outcome: Met This Shift  Goal: LTG - patient will utilize safety techniques  Outcome: Met This Shift  Goal: STG - patient locks brakes on wheelchair  Outcome: Met This Shift  Goal: STG - Patient uses call light consistently to request assistance with transfers  Outcome: Met This Shift  Goal: Free from accidental physical injury  Outcome: Met This Shift     Problem: DAILY CARE  Goal: Daily care needs are met  Outcome: Met This Shift     Problem: SKIN INTEGRITY  Goal: Skin integrity is maintained or improved  Outcome: Met This Shift     Problem: KNOWLEDGE DEFICIT  Goal: Patient/S.O. demonstrates understanding of disease process, treatment plan, medications, and discharge instructions.   Outcome: Met This Shift     Problem: DISCHARGE BARRIERS  Goal: Patient's continuum of care needs are met  Outcome: Met This Shift

## 2020-07-01 NOTE — PROGRESS NOTES
Occupational Therapy  OCCUPATIONAL THERAPY DAILY NOTE    Date:2020  Patient Name: Jazzmine Saavedra  MRN: [id number]  : 1927  Room: [address] [place] [address]     Diagnosis:s/p fall- L hip fx- s/p closed reduction cephalomedullary nail fixation   Additional Pertinent Hx: DM, HTN, HLD, CHF, CKD, hx BTKR, arthritis    Precautions: falls,WBAT LLE,[place] has hearing aides    Functional Assessment:   Date Status AE  Comments   Feeding 20  Mod I      Grooming 20  Set up      Bathing 20  UB- set up   LB- Min A      UB Dressing 20  Minimal Assist      LB Dressing 20  Mod A Reacher, sock aid ,long shoe horn  Pt able to use reacher and sock aid to doff/ciarra socks/shoes with increased time and physical assist in AM   Homemaking 20  SBA ww      Functional Transfers / Balance:   Date Status DME  Comments   Sit Balance 20  Supervision      Stand Balance 20  CGA/SBA  ww    [] Tub  [x] Shower   Transfer 20  Min A  Ww, shower seat     Commode   Transfer      toileting      20  Min A        Min  Assist  Ww, grab rail  Assist with sit-stand due to UE weakness   Functional   Mobility 20  SBA  ww    Other: w/c <>bed         Sit to stand from w/c to ww           On/off recliner and kitchen chair with arm rests  20   SBA           SBA              CGA            Ww,using leg          ww           ww      Functional Exercises / Activity:  Pt sitting in chair upon arrival to OT gym. Engaged in card matching activity, standing at table to increase problem solving and standing tolerance/balance. Pt able to stand 2x5 mins at SBA and no vc's for assistance with errors. Engaged in therex, seated, using 3# DB, 2x15 reps (elbow/shoulder flex), to increase BUE strength for ease with functional transfers. Pt taken back to  and Min A for commode transfer.     Pt sitting in w/c upon arrival to OT gym in PM. Engaged in therex by flattening blue resistance putty using wheel to increase BUE strength, posture, and core strength for ease with functional transfers. Engaged in therex of towel slides on inclined wedge to increase BUE ROM, 3x15 reps shoulder flex. Participated in removal and placement of plastic rings on wooden stand using reacher to increase strength using reacher for ease with LE dressing. Pt demo'ing F+ coordination with reacher. Sensory / Neuromuscular Re-Education:      Cognitive Skills:   Status Comments   Problem   Solving fair  During transfers and exercises. Memory fair  \"   Sequencing fair  \"   Safety fair  \"     Visual Perception:    Education:  Pt was educated on ways to increase surface height of furniture  to aid in  increasing level of independence with sit to stand transfers     [x] Family teach completed on:   6/25/20: Completed family teach this session. Pt's daughter present. Educated daughter on pt's status with ADLs  and vc's for sequencing/initiation of tasks/hand placement with functional transfers. Daughter made aware of pt's declined of AE, reporting that she will assist pt with ADLs. Educated on energy conservation techniques and DME. Educated on removal of throw rugs. Daughter reports that she does not believe a BSC will fit next to pt's bed for nighttime use. Daughter reported that the patient's granddaughter will be at home when daughter is at work. Therapist advised for granddaughter to come in for further family teach to complete hands on assist with ADLs/functional transfers with pt. Pt able to complete functional transfers on hard floor/carpeting, sofa/dining area/bed/walk in shower transfers. Daughter did not complete hands on with transfers and only observed this session. She was educated on body mechanics/hand placement/vc's to provide for pt to increase safety with functional transfers/ADLs. Educated on gait belt. Pt's limiting factor appears to be her activity tolerance.  Daughter and pt demo'ing F+ with understanding of education. Pain Level: 0/10    Additional Notes:   6/25/20: leg  given to pt    Patient has made fair  progress during treatment sessions toward set goals. Therapy emphasis to obtain goals:Current Treatment Recommendations: Strengthening, Positioning, Gait Training, Safety Education & Training, Balance Training, Patient/Caregiver Education & Training, Self-Care / ADL, Functional Mobility Training, Equipment Evaluation, Education, & procurement, Home Management Training, Endurance Training      [x] Continue with current OT Plan of care.   [] Prepare for Discharge     DISCHARGE RECOMMENDATIONS  Recommended DME:    Post Discharge Care:   []Home Independently  [x]Home with 24hr Care / Supervision []Home with Partial Supervision []Home with Home Health OT []Home with Out Pt OT []Other: ___   Comments:         Time in Time out Tx Time Breakdown  Variance:   First Session  10:00 10:45 [] Individual Tx-   [x] Concurrent Tx -45  [] Co-Tx -   [] Group Tx -   [] Time Missed -     Second Session 1:45 2:30 [x] Individual Tx-45   [] Concurrent Tx   [] Co-Tx -   [] Group Tx -   [] Time Missed -                   Third Session    [] Individual Tx-   [] Concurrent Tx -  [] Co-Tx -   [] Group Tx -   [] Time Missed -         Total Tx Time:  [address], OTR/L [id number]

## 2020-07-01 NOTE — PROGRESS NOTES
06/17/2020    EOSABS 0.04 06/17/2020    BASOSABS 0.01 06/17/2020        Assessment:  1. Left foot pain. 2.  Left foot contusion. 3.  Onychomycosis. 4. PAD. 5.  Insulin-dependent diabetes mellitus with neuropathy. Plan:  X-rays reviewed. No infectious process. Continue surgical shoe. No plans for invasive intervention. Appreciate vascular surgery consultation. No plans for intervention. Will follow her as outpatient on discharge.   DC on 7/2    Mary Trinh DPM  Fellowship-Trained Foot and Ankle Surgeon  [phone]        Electronically signed by Joey Turner DPM on 7/1/2020 at 4:16 PM

## 2020-07-01 NOTE — PROGRESS NOTES
Physical Therapy  Treatment Note  Supervising Therapist: Marge Torrez. Yvon Miller, [id number]    NAME: Steve Mock  ROOM: 3455M  DIAGNOSIS: Closed L hip fracture  PRECAUTIONS: Falls, WBAT LLE, [place], L post-op shoe  PROCEDURE(S): L hip closed reduction and cephalomedullary nail fixation 6/4/20  HPI: Pt suffered a fall at home when reaching towards the floor to adjust a rug. Pt was admitted to ED and had the above procedure performed. Significant PMHX of DM, HTN, arthritis, and CHF. Social:  Pt lives with her daughter in a ranch style floor plan with 2 steps and  1 rail (L side going up) to enter. Prior to admission pt ambulated with [employer] independently. Pt has 5 children who are local.     Initial Evaluation  6/17/20 AM     PM    Short Term Goals Long Term Goals    Was pt agreeable to Eval/treatment? yes With encouragement in room only Yes     Does pt have pain? 7/10 L knee pain L hip pain with activity L hip pain with activity     Bed Mobility  Rolling: Mod A  Supine to sit: Mod A  Sit to supine: Mod A  Scooting: Mod A Rolling: Supervision  Supine<>sit: Min A  Scooting: Supervision  (leg , no shoes, hospital bed R side) Rolling: Supervision  Supine<>sit: Supervision  Scooting: Supervision  (leg , no shoes, hospital bed, L side) SBA Supervision   Transfers Sit to stand: Mod A  Stand to sit: Mod A  Stand pivot: Mod A with [employer] Sit<>stand: Supervision  Stand pivot: Supervision with [employer] Sit<>stand: Supervision  Stand pivot: Supervision with [employer] Min A SBA   Ambulation   10 feet with [employer] with Mod A 10 feet with WW with Supervision 20 feet with WW with Supervision 50 feet with [employer] with Min A 75 feet with WW with SBA   Walking 10 feet on uneven surface NT NT NT 10 feet with [employer] with Min A 10 feet with [employer] with SBA   Wheel Chair Mobility NT NT NT  >150 feet with BUE with Modified [place]   Car Transfers NT NT NT Min A SBA   Stair negotiation: ascended and descended Attempted, unable to complete.  Completed 2 inch step in parallel bars with backward descending NT 3 steps with 1 rail + R HHA with Min A 2 steps with 2 rails with Min A 4 steps with 2 rails with SBA   Curb Step:   ascended and descended NT NT NT     Picking up object off the floor NT NT NT     BLE ROM [facility] WFL      BLE Strength RLE: 4-/5  L hip: 3-/5  L knee/ankle: 3+/5 RLE: 4-/5  L hip: 3-/5  L knee/ankle: 3+/5      Balance  Sitting: SBA  Standing: Mod A with [employer] Sitting: Supervision  Standing: SBA with [employer]      Date Family Teach Completed TBA Pt's granddaughter present for hands-on 6/17, daughter Lakshmi José present for hands-on 6/23, daughter Juan M Fillers present 6/25 for hands-on      Is additional Family Teaching Needed? Y or N Y Yes      Hindering Progress Pain, weakness Pain, weakness      PT recommended ELOS 2-3 weeks       Team's Discharge Plan        Therapist at Team Meeting          Therapeutic Exercise:   AM:   Ambulation: 6x10 feet with [employer] with Supervision  Sit<>stand x5 with Supervision  Stand pivot transfer x2 with [employer] with Supervision  PM:   Ambulation: 2x20 feet with [employer] with Supervision  Stair negotiation: 2x3 steps with 1 rail + R HHA with Min A  Stair negotiation: 1 step with BUE support on 1 rail with Min A  Emphasis on function    Additional Comments: The pt reported bowel urgency after receiving laxative this morning and was unwilling to leave the room as a result. The pt was agreeable to activity within the room but was late starting as she was in the bathroom. The pt was able to complete all standing activity with no assistance but required assistance to get into and out of the bed due to attempting from the R side rather than the L as she has previously. The pt should complete transfers into and out of the bed at the L side in order to improve her [place]. The pt performed bed mobility at the L side of the hospital bed with no difficulty in the afternoon.  The pt was unable to negotiate steps with a single rail despite multiple efforts, she is able to ascend with single HHA more consistently after repetition. Patient/family education  Pt/family educated on bed mobility with leg . Patient/family response to education:   Pt/family verbalized understanding Pt/family demonstrated skill Pt/family requires further education in this area   Yes Required assist Yes     AM  Time in: 1100  Time out: 1130    PM  Time in: 1300  Time out: 1345    Pt is making good progress toward established Physical Therapy goals. Continue with physical therapy current plan of care. Vipul Valles, [facility]  License Number: [id number]

## 2020-07-01 NOTE — PROGRESS NOTES
[facility]  Comprehensive Progress Note    Subjective:      Nuria Hemphill is a 80 y.o. female admitted to inpatient rehabilitation for impairments and activities limitations in ADLs and mobility secondary to left hip fracture, s/p closed reduction and cephalomedullary nail fixation . No acute events overnight. No cp, sob, n/v.  Patient is tolerating therapy program, progressing. No new issues reported. The patient's medical records have been reviewed. Scheduled Meds:polyethylene glycol, 17 g, Daily  metoclopramide, 2.5 mg, TID AC  docusate sodium, 100 mg, BID  senna, 1 tablet, Nightly  aspirin, 81 mg, Daily  carvedilol, 6.25 mg, BID WC  bumetanide, 1 mg, Daily  colchicine, 0.6 mg, Daily  clopidogrel, 75 mg, Daily  potassium chloride, 20 mEq, BID WC  allopurinol, 100 mg, Daily  predniSONE, 5 mg, Daily  glipiZIDE, 5 mg, QAM AC  pantoprazole, 40 mg, QAM AC  vitamin D, 50,000 Units, Once per day on Wed  sacubitril-valsartan, 1 tablet, BID      Continuous Infusions:   dextrose       PRN Meds:lactulose, 20 g, Daily PRN  hydrALAZINE, 25 mg, Q6H PRN  traMADol, 50 mg, Q6H PRN  calcium carbonate, 500 mg, BID PRN  acetaminophen, 650 mg, Q4H PRN  glucose, 15 g, PRN  dextrose, 12.5 g, PRN  glucagon (rDNA), 1 mg, PRN  dextrose, 100 mL/hr, PRN         Objective:      Vitals:    06/30/20 1600 06/30/20 1630 07/01/20 0730 07/01/20 1024   BP: 128/68  132/70    Pulse: 87 87 90    Resp: 16  16    Temp:   98.4 °F (36.9 °C)    TempSrc:   Temporal    SpO2: 97% 95% 95% 96%   Weight:       Height:           General appearance: Alert, NAD, up in w/c  Lungs: CTA b/l, no w/r/r  Heart: RRR  Abdomen: soft, non-tender, normal bowel sounds  Extremities: LLE ROM impaired, hip precautions. Mild BLE edema,  no calf tenderness. Skin:    L hip surgical incision c/d/i   Neurologic: Alert, oriented. Strength 5/5 BUE.  Strength 4/5 BLE      Laboratory:    Lab Results   Component Value Date    WBC 5.5 06/26/2020    HGB 9.7 (L) 06/26/2020    HCT 32.1 (L) 06/26/2020    MCV 87.7 06/26/2020     06/26/2020     Lab Results   Component Value Date     06/26/2020    K 4.7 06/26/2020     06/26/2020    CO2 23 06/26/2020    BUN 21 06/26/2020    CREATININE 1.3 06/26/2020    GLUCOSE 107 06/26/2020    CALCIUM 8.6 06/26/2020      Lab Results   Component Value Date    ALT 17 02/20/2020    AST 24 02/20/2020    ALKPHOS 99 02/20/2020    BILITOT 0.7 02/20/2020       Lab Results   Component Value Date    COLORU YELLOW 12/08/2016    GLUCOSEU NEG 12/08/2016    KETUA NEG 12/08/2016    BILIRUBINUR NEG 12/08/2016     Lab Results   Component Value Date    LABA1C 7.7 (H) 11/21/2019       Functional Status:   Physical Therapy:  Bed mobility: Supervision  Transfers: Supervision  Ambulation: 20 ft [employer] Supervision      Occupational Therapy:  Feeding: Mod I  Grooming: Set up  UB dressing: Min A  LB dressing: Mod A     Assessment/Plan:     80 y.o. female admitted to inpatient rehabilitation for impairments and activities limitations in ADLs and mobility secondary to left hip fracture, s/p closed reduction and cephalomedullary nail fixation .    -Intertrochanteric fracture of the left femur: s/p  left hip closed reduction and cephalomedullary nail fixation on 6/4/2020 by Dr. Shakir Butler. WBAT LLE. Continue acute rehab program.  -Acute pain: Tramadol or tylenol PRN  -Acute blood loss anemia: received 2 units pRBC at [facility] (6/5, 6/7). H&H improved post transfusion. Monitor H&H--stable. --Aspiration pneumonia: seen by ID at OSH, treated with Unasyn then switched to oral Levaquin and Augmentin to complete course (stop date 6/19/2020)--RESOLVED  -DM: continue glipizide, BG adequately controlled, monitor  -HTN: continue home medications, monitor  -CKD- baseline Cr appears to be 1.4. --Cr at baseline, monitor  -PAD- appreciate podiatry and vascular surgery recommendations. No acute intervention.  Patient to follow up as outpatient   -DVT prophylaxis: Home dose Aspirin and Plavix per Ortho recommendations.        Anticipate discharge tomorrow    Electronically signed by Fabiana Mi MD on 7/1/2020 at 11:40 AM

## 2020-07-01 NOTE — PROGRESS NOTES
DISCHARGE SUMMARY    Group interaction skills/socialization:  Lilibeth Downs displayed social interaction skills at the modified [place]. Leisure participation/awareness:  Lilibeth Downs participated in 2 therapeutic recreation interventions identifying 3 benefits to leisure participation.     Other:     Outcomes: goals achieved      Electronically signed by Hannah Benitez on 7/1/2020 at 1:09 PM

## 2020-07-01 NOTE — PATIENT CARE CONFERENCE
[address] [place] [address]  ACUTE REHABILITATION  TEAM CONFERENCE NOTE/PATIENT PLAN OF CARE    Date: 2020  Admission date: 2020  Patient Name: Ko Jauregui        MRN: [id number]    : 1927  (80 y.o.)  Gender: female   Rehab diagnosis/surgery with date:  Left intertrochanteric hip fracture of 6/3/20, CM nailing done 20  Impairment Group Code:  8.11      MEDICAL/FUNCTIONAL HISTORY/STATUS:  vascular studies done due to pain and bilateral lower extremity swelling, vascular surgery saw on consult 20-dopplers negative for DVT, has significant peripheral vascular disease    Consultations/Labs/X-rays: as above      MEDICATION UPDATE:  completed oral antibiotic for recent aspiration pneumonia post op      NURSING FIMS:    Bowel:   Current level: continent    Bladder:   Current level: continent    Toilet Hygiene:   Current level : mod assist  Short term Toilet hygiene goal: min assist  Long term toilet hygiene goal:  min assist    Skin integrity: intact  Pain: left hip pain, on Ultram every 6 hours as needed    NUTRITION    Diet  carb control  Liquid consistency   thin    SOCIAL INFORMATION:  Lives with: daughter  Prior [facility] [address], 2 entry  rail from garage, walk in shower  Prior Level of function:  independent, no driving, used a wheeled walker outside  DME:  wheeled walker , rollator, shower chair, lift chair    FAMILY / PATIENT EDUCATION:  family here daily    PHYSICAL THERAPY    Bed mobility:   Current level: supervision with leg   Short term bed mobility goal: met  Long term bed mobility goal: met    Chair/bed transfers:  Current level: standby assist with wheeled walker  Short term Chair/bed transfers goal: met  Long term Chair/bed transfers goal: met      Ambulation:   Current level: 30 ft wheeled walker standby assist  Short term ambulation goal: 50 ft. wheeled walker at min assist  Long term ambulation goal: 75 ft at standby assist    Wheelchair Mobility:  Current level: 120 ft at standby assist  Short term wheelchair goal: met  Long term wheelchair goal: 150 ft.  at Modified independent      Car transfers:   Current level: mod assist  Short term car transfers goal: min assist  Long term car transfers goal:standby assist    Stairs:   Current level : 3 with 1 rail at hand held  min assist  Short term stairs goal: met  Long term stairs goal: 4 with 2 rails at standby assist    OCCUPATIONAL THERAPY:      Tub/shower:   Current level: min assist with bench  Short term tub/shower goal: met  Long term tub/shower goal: met      Feeding:  Current level: Modified independent  Short term feeding goal: Modified independent  Long term feeding goal: Modified independent    Grooming:   Current level: standby assist sit or stand  Short term grooming goal: met  Long term grooming goal: Modified independent    Bathing:  Current level: min assist  Short term bathing goal: standby assist  Long term bathing goal: standby assist    Homemaking:   Current level: light prep, standby assist-min assist  Short term homemaking goal: met  Long term homemaking goal: met    Upper body dressing:  Current level: min assist  Short term upper body dressing goal: Modified independent  Long term upper body dressing goal: independent    Lower body dressing:  Current level: mod assist with adaptive equipment  Short term lower body dressing goal: min assist  Long term lower body dressing goal: min assist    Toilet transfer:   Current level: Contact guard assist  Short term toilet transfer goal: supervision  Long term toilet transfer goal: Modified independent      Social interaction:  Modified independent    Safety awareness: fair      Patient/family's personal goals: \"get back home\"  Factors supporting goal achievement:  supportive family  Factors hindering goal achievement:  pain      Discharge Plan   Estimated Length of Stay: 7/2/20            Destination: home   Services at Discharge: home health for  PT, OT, nursing aide  Equipment at Discharge: wheelchair, hospital bed      INTERDISCIPLINARY TEAM/PHYSICIAN [address]:  finalize discharge for 7/2/20 to home      I approve the established interdisciplinary plan of care as documented within the medical record of Janis Flynn       Electronically signed by Farida West RN on 7/1/2020 at 9:21 AM  The following interdisciplinary team members were present:  ELIU Pagan, LSW  Cheryl Torres, PT, DPT  Floridalma Oneil OTR

## 2020-07-02 NOTE — PROGRESS NOTES
4-/5  L hip: 3-/5  L knee/ankle: 3+/5 RLE: 4-/5  L hip: 3-/5  L knee/ankle: 3+/5     Balance  Sitting: SBA  Standing: Mod A with [employer] Sitting: Independent  Standing: Supervision with [employer]     Date Family Teach Completed TBA Pt's granddaughter present for hands-on 6/17, daughter Heriberto Pérez present for hands-on 6/23, daughter Bea Casillas present 6/25, 7/2 for hands-on     Hindering Progress Pain, weakness Pain, weakness       Additional Comments: The pt made good progress during her time in acute rehab meeting or surpassing all of her short-term PT goals with the exception of car transfers. The pt failed to reach her long-term PT goals for stair negotiation and car transfers as she continues to require assistance for these functional movements. The pt's ambulation distance was limited by L hip pain. The pt's daughter has been present for a family teach session and demonstrated understanding of the pt's needs at discharge as well as an ability to assist the pt. The pt should continue with PT services on a home health basis to improve safety and frequency of mobility in the home. Arie Arthur.  Lashawn Hobson, [address]  number:  PT [phone]

## 2020-07-02 NOTE — PROGRESS NOTES
Physical Therapy  Treatment Note  Supervising Therapist: Cecil León. Edilma Betancur, [id number]    NAME: Rachel Gunter  ROOM: [id number]  DIAGNOSIS: Closed L hip fracture  PRECAUTIONS: Falls, WBAT LLE, [place] [employer] [place], L post-op shoe  PROCEDURE(S): L hip closed reduction and cephalomedullary nail fixation 6/4/20  HPI: Pt suffered a fall at home when reaching towards the floor to adjust a rug. Pt was admitted to ED and had the above procedure performed. Significant PMHX of DM, HTN, arthritis, and CHF. Social:  Pt lives with her daughter in a ranch style floor plan with 2 steps and  1 rail (L side going up) to enter. Prior to admission pt ambulated with [employer] independently. Pt has 5 children who are local.     Initial Evaluation  6/17/20 AM         Short Term Goals Long Term Goals    Was pt agreeable to Eval/treatment? yes With encouragement in room only Discharged     Does pt have pain? 7/10 L knee pain L hip pain with activity      Bed Mobility  Rolling: Mod A  Supine to sit: Mod A  Sit to supine: Mod A  Scooting: Mod A Rolling: Supervision  Supine<>sit: Supervision  Scooting: Supervision  (leg , no shoes, hospital bed, L side)  SBA Supervision   Transfers Sit to stand: Mod A  Stand to sit: Mod A  Stand pivot: Mod A with [employer] Sit<>stand: Supervision  Stand pivot: Supervision with [employer]  Min A SBA   Ambulation   10 feet with [employer] with Mod A 30 feet with WW with Supervision  50 feet with WW with Min A 75 feet with WW with SBA   Walking 10 feet on uneven surface NT 10 feet with [employer] with SBA  10 feet with [employer] with Min A 10 feet with [employer] with SBA   Wheel Chair Mobility NT NT   >150 feet with BUE with Modified [place]   Car Transfers NT Mod A  Min A SBA   Stair negotiation: ascended and descended Attempted, unable to complete.  Completed 2 inch step in parallel bars with backward descending 4 steps with 1 rail + R HHA with Min A  2 steps with 2 rails with Min A 4 steps with 2 rails with SBA   Curb Step:   ascended and descended NT NT Picking up object off the floor NT NT      BLE ROM [facility] WFL      BLE Strength RLE: 4-/5  L hip: 3-/5  L knee/ankle: 3+/5 RLE: 4-/5  L hip: 3-/5  L knee/ankle: 3+/5      Balance  Sitting: SBA  Standing: Mod A with [employer] Sitting: Supervision  Standing: SBA with [employer]      Date Family Teach Completed TBA Pt's granddaughter present for hands-on 6/17, daughter Guru Lopes present for hands-on 6/23, daughter Grayson Elkins present 6/25, 7/2 for hands-on      Is additional Family Teaching Needed? Y or N Y Yes      Hindering Progress Pain, weakness Pain, weakness      PT recommended ELOS 2-3 weeks       Team's Discharge Plan        Therapist at Team Meeting          Therapeutic Exercise:   AM:   Emphasis on function prior to discharge    Additional Comments: The pt's daughter was present for the family teach session in the morning and performed stair negotiation with the pt as well as guarding during ambulation. The pt required assistance to perform stair negotiation for HHA only to replace the R side hand-rail. The pt's daughter guarded the pt appropriately with no cues required and was pleased with the pt's progress, all questions were answered. Patient/family education  Pt/family educated on stair negotiation guarding technique, car transfer was reviewed. Patient/family response to education:   Pt/family verbalized understanding Pt/family demonstrated skill Pt/family requires further education in this area   Yes Yes No     AM  Time in: 0915  Time out: 0950    Pt is making good progress toward established Physical Therapy goals. Continue with physical therapy current plan of care. Demi Marroquin.  Jaun Link, [address] [place] [address]  License Number: [id number]

## 2020-07-02 NOTE — DISCHARGE SUMMARY
[facility] Physical Medicine and Rehabilitation  Discharge Summary    Date of Rehab Admission:6/16/2020    Date of Discharge: 7/2/2020    Primary Sheila Catherine MD     Attending Physician: Fabiana Mi MD  Primary Service:  Acute Inpatient Rehabilitation     Primary Diagnosis: left hip fracture, s/p closed reduction and cephalomedullary nail fixation  Secondary Diagnosis/es: acute post operative pain, acute blood loss anemia, recent aspiration pneumonia, DM, HTN, CKD, PAD     Consults:   Podiatry, Vascular surgery   Procedures:  None  Diagnostic Tests/Significant Radiologic Results:   Left lower extremity duplex 6/18/2020  Impression         No evidence to suggest deep venous thrombosis of the left lower    extremity. Xray left foot  Impression    There are degenerative changes of the interphalangeal joints and    diffuse osteopenia without definite evidence of fracture or bony    misalignment.         There is some generalized soft tissue swelling about the entire foot    and diabetic-type arterial calcifications which are nonspecific. Vague    hypodensity in the region of the ankle on the DP view is of    questionable significance, and could be clothing artifact. Physical    examination to exclude soft tissue gas cephalad to the ankle is    recommended.       Lower extremity arterial segmental pressures  Narrative    Bilateral iliac and femoral popliteal arterial occlusive disease, with    an ankle-brachial index of 0.48 on the right and 0.80 on the left,    with significantly diminished pulse volume recordings over the    metatarsals bilaterally, right more than the left.         Significantly diminished almost flat pulse volume recordings noted    over the toes of the right foot and decreased pulse volume recordings    noted over the toes of the left foot        Bilateral lower extremity duplex 6/30/2020  Impression    No evidence of deep venous thrombosis.             Discharge Disposition:  Home  Condition on Discharge:  Stable. Functionally improved.  ===================================================================  History of Present Illness:     Reina Esparza is a 81 y/o female who presented to [address] [place] [address] ED on 6/3/2020 due to left hip pain s/p ground level fall. She was preparing to take a shower and laying down a rug when she lost her balance and fell. She did strike her head, no LOC. Head CT showed no acute process. She was found to have a closed comminuted intertrochanteric fracture of the left femur. She was transferred to [facility] and admitted there. She was evaluated by Orthopedics and underwent left hip closed reduction and cephalomedullary nail fixation on 6/4/2020 by Dr. Maged Obregon. She is WBAT to the left lower extremity. Orthopedics recommended that she resume Plavix in addition to Aspirn 81mg daily for DVT prophylaxis. Staples were to be removed in 2 weeks. Hospital course was noted for acute blood loss anemia for which she received 2 units pRBC at outside hospital. Course also complicated by aspiration pneumonia, for which she is completing antibiotic course. She completed therapy evaluations and was felt to be a good candidate for further rehabilitation. She was then admitted to [address] [place] [address].     ===================================================================    Functional Status      Initial Evaluation  6/17/20 Discharge  7/2/20   Bed Mobility  Rolling: Mod A  Supine to sit: Mod A  Sit to supine: Mod A  Scooting: Mod A Rolling: Supervision  Supine<>sit: Supervision  Scooting: Supervision  (leg , no shoes)   Transfers Sit to stand: Mod A  Stand to sit: Mod A  Stand pivot:  Mod A with [facility] Sit<>stand: Supervision  Stand pivot: Supervision with [facility]   Ambulation   10 feet with [facility] with Mod A 30 feet with WW with Supervision   Walking 10 feet on uneven surface NT 10 feet with [facility] with SBA   Wheel Chair Mobility  feet with BUE with Modified [place]   Car Transfers NT Mod A   Stair negotiation: ascended and descended Attempted, unable to complete. Completed 2 inch step in parallel bars with backward descending 4 steps with 1 rail + R HHA with Min A   Curb Step:   ascended and descended NT 2 inch step with WW with SBA   Picking up object off the floor NT Picked up a cone with SBA   Balance  Sitting: SBA  Standing: Mod A with [employer] Sitting: Independent  Standing: Supervision with [employer]   Date Family Teach Completed TBA Pt's granddaughter present for hands-on 6/17, daughter Garrick Robles present for hands-on 6/23, daughter [place] present 6/25, 7/2 for hands-on       Functional Assessment:      Date   Status   AE    Comments     Feeding   6/28/20    Mod I              Grooming   6/29/20    Set up              Bathing   6/29/20    UB- set up     LB- Min A            UB Dressing   6/29/20    Minimal Assist              LB Dressing   7/1/20    Mod A   Reacher, sock aid ,long shoe horn          Homemaking   6/28/20    SBA   ww             Discharge Physical Examination  General appearance: Alert, NAD, up in w/c  Lungs: CTA b/l, no w/r/r  Heart: RRR  Abdomen: soft, non-tender, normal bowel sounds  Extremities: LLE ROM impaired, hip precautions. Mild BLE edema,  no calf tenderness. Skin:    L hip surgical incision c/d/i   Neurologic: Alert, oriented. Strength 5/5 BUE. Strength 4/5 BLE       Hospital Course   Functional and mobility deficits secondary to Intertrochanteric fracture of the left femur: s/p  left hip closed reduction and cephalomedullary nail fixation :  The patient completed an intensive inpatient rehabilitation program including PT, OT, and achieved improvement in function as documented above. Recommended continued therapies are documented below.     -Intertrochanteric fracture of the left femur: s/p  left hip closed reduction and cephalomedullary nail fixation on 6/4/2020 by Dr. Ania Martinez  WBAT Y[place]. Completed acute rehab program with functional progress as above.  -Acute pain: Tramadol or tylenol PRN. Wean pain medication as tolerated.  -Acute blood loss anemia: received 2 units pRBC at [facility] (6/5, 6/7). H&H improved post transfusion. Monitored H&H--stable during rehab. --Aspiration pneumonia: seen by ID at OSH, treated with Unasyn then switched to oral Levaquin and Augmentin to complete course (stop date 6/19/2020)--RESOLVED  -DM: continued glipizide, BG adequately controlled  -HTN: continued home medications  -CKD- baseline Cr appears to be 1.4. --Cr at baseline  -PAD- appreciate podiatry and vascular surgery recommendations. No acute intervention. Patient to follow up with podiatry and vascular surgery as outpatient   -DVT prophylaxis: Home dose Aspirin and Plavix per Ortho recommendations.    ==================================================================  Discharge Medications     Medication List      START taking these medications    aspirin 81 MG chewable tablet  Take 1 tablet by mouth daily  Start taking on:  July 3, 2020     traMADol 50 MG tablet  Commonly known as:  ULTRAM  Take 1 tablet by mouth every 6 hours as needed for Pain for up to 5 days. CHANGE how you take these medications    carvedilol 3.125 MG tablet  Commonly known as:  COREG  TAKE ONE TABLET BY MOUTH TWO TIMES A DAY  What changed:  See the new instructions.         CONTINUE taking these medications    allopurinol 100 MG tablet  Commonly known as:  ZYLOPRIM     bumetanide 1 MG tablet  Commonly known as:  BUMEX     clopidogrel 75 MG tablet  Commonly known as:  PLAVIX  TAKE ONE TABLET BY MOUTH DAILY     colchicine 0.6 MG tablet  Commonly known as:  COLCRYS  Take 1 tablet by mouth daily     Entresto 24-26 MG per tablet  Generic drug:  sacubitril-valsartan     Glucotrol XL 5 MG extended release tablet  Generic drug:  glipiZIDE  TAKE ONE TABLET BY MOUTH DAILY     metoclopramide 5 MG tablet  Commonly known as: Reglan  Take 0.5 tablets by mouth 3 times daily     NexIUM 40 MG delayed release capsule  Generic drug:  esomeprazole     ONE TOUCH ULTRA TEST strip  Generic drug:  blood glucose test strips  test blood sugar twice daily     potassium chloride 20 MEQ extended release tablet  Commonly known as:  KLOR-CON M  TAKE ONE TABLET BY MOUTH TWO TIMES A DAY     predniSONE 5 MG tablet  Commonly known as:  DELTASONE  TAKE ONE TABLET BY MOUTH DAILY     vitamin D 1.25 MG (63082 UT) Caps capsule  Commonly known as:  ERGOCALCIFEROL  TAKE ONE CAPSULE BY MOUTH ONCE A WEEK        STOP taking these medications    triamcinolone 0.1 % cream  Commonly known as:  KENALOG           Where to Get Your Medications      These medications were sent to [facility], [address] [facility] [facility] [phone]  [address] [place] [address]     Phone:  [phone]   · aspirin 81 MG chewable tablet     You can get these medications from any pharmacy    Bring a paper prescription for each of these medications  · traMADol 50 MG tablet         Allergies  Iodine and Iv dye [iodides]      Recommended Therapies at Discharge: Home health PT, OT, nursing, aid    Follow-Up  -Primary care: Dr. Sofie Phillips  -Vascular surgery: Dr. Garrick Barclay  -Podiatry: Dr. Annette Walsh  -PM&R: Dr. Joseline Glass, as needed     Information provided to the patient and appropriate family members regarding discharge medications and follow up    Electronically signed by David Mckeon MD on 7/2/2020 at 11:38 AM

## 2020-07-02 NOTE — PROGRESS NOTES
Occupational Therapy  OCCUPATIONAL THERAPY DAILY NOTE/DISCHARGE SUMMARY     Date:2020  Patient Name: Kirstie Edward  MRN: [id number]  : 1927  Room: [address] [place] [address]     Diagnosis:s/p fall- L hip fx- s/p closed reduction cephalomedullary nail fixation   Additional Pertinent Hx: DM, HTN, HLD, CHF, CKD, hx BTKR, arthritis    Precautions: falls,WBAT LLE,[place] has hearing aides    Functional Assessment:   Date Status AE  Comments   Feeding 20  Mod I      Grooming 20  Set up      Bathing 20  UB- set up   LB- Min A      UB Dressing 20  Minimal Assist      LB Dressing 20  Mod A Reacher, sock aid ,long shoe horn     Homemaking 20  SBA ww      Functional Transfers / Balance:   Date Status DME  Comments   Sit Balance 20  Supervision      Stand Balance 20  CGA/SBA  ww    [] Tub  [x] Shower   Transfer 20  Min A  Ww, shower seat     Commode   Transfer      toileting      20  CGA         Min  Assist  Ww, grab rail          Pt completed hygiene following voiding. Pt needed some assist pull pants upe    Functional   Mobility 20  SBA  ww Short distance in therapy apt setting    Other: w/c <>bed         Sit to stand from w/c to ww           On/off recliner  20  SBA           SBA            CGA           Ww,using leg          ww           ww      Functional Exercises / Activity:      Sensory / Neuromuscular Re-Education:      Cognitive Skills:   Status Comments   Problem   Solving fair  During transfers and exercises. Memory fair  \"   Sequencing fair  \"   Safety fair  \"     Visual Perception:    Education:      [x] Family teach completed on:   20: Completed family teach this session. Pt's daughter present. Educated daughter on pt's status with ADLs  and vc's for sequencing/initiation of tasks/hand placement with functional transfers.  Daughter made aware of pt's declined of AE, reporting that she will assist pt with ADLs. Educated on energy conservation techniques and DME. Educated on removal of throw rugs. Daughter reports that she does not believe a BSC will fit next to pt's bed for nighttime use. Daughter reported that the patient's granddaughter will be at home when daughter is at work. Therapist advised for granddaughter to come in for further family teach to complete hands on assist with ADLs/functional transfers with pt. Pt able to complete functional transfers on hard floor/carpeting, sofa/dining area/bed/walk in shower transfers. Daughter did not complete hands on with transfers and only observed this session. She was educated on body mechanics/hand placement/vc's to provide for pt to increase safety with functional transfers/ADLs. Educated on gait belt. Pt's limiting factor appears to be her activity tolerance. Daughter and pt demo'ing F+ with understanding of education. 7/2/20   Pt's Daughter present for family teach session. Pt was issued adaptive dressing equipment and Daughter and pt were instructed on use and how it increases pt's level of independence with LB dressing. Daughter observed and assisted hands on as pt completed functional transfers and mobility with ww. Discussed environment adaptations to make to home to increase level of safety and independence. Daughter verbalized good understanding. Pain Level: 0/10    Additional Notes:   6/25/20: leg  given to pt    Patient has made fair  progress during treatment sessions toward set goals. Therapy emphasis to obtain goals:Current Treatment Recommendations: Strengthening, Positioning, Gait Training, Safety Education & Training, Balance Training, Patient/Caregiver Education & Training, Self-Care / ADL, Functional Mobility Training, Equipment Evaluation, Education, & procurement, Home Management Training, Endurance Training      [] Continue with current OT Plan of care.   [x] Prepare for Discharge     DISCHARGE RECOMMENDATIONS  OCCUPATIONAL THERAPY DISCHARGE SUMMARY    Discontinue Occupational Therapy intervention. Pt has:   [] met all set goals. [x] made good progress toward set goals. [] has made slow progress toward goals and would benefit from rehab setting change.  [] had a medical decline and therefore was transferred off the Rehab unit.     Long term goals  Time Frame for Long term goals : 2-3 weeks to address above problem areas  Long term goal 1: Pt demo Mod I to eat all meals  Long term goal 2: Pt demo Mod I grooming seated or standing  Long term goal 3: Pt demo SBA to bathe @ shower level or sponge bathing both seated & standing  Long term goal 4: Pt demo I UE & Mod I LE dress with AE as needed  Long term goal 5: Pt demo Mod I commode trf with appropriate DME to ensure pt safety  Long term goals 6: Pt demo SBA-CGA walk in shower trf with appropriate DME to ensure pt safety  Long term goal 7: Pt demo G- endurance for a 20 minute functional activity  Long term goal 8: Pt demo improved FMC/dexterity & improved  strength to improve pt ability to complete ADLs & IADLs as evidenced by gains made in  strength R UE 30# & norm for pt age & gender is 43# & L hand 26# & norm for pt age & gender is 38#; 9-hole peg test R hand 30.3 seconds *& norm for pt age & gender is 22.5 seconds & L hand is 30.3 seconds & norm for pt age & gender is 24.1 seconds    The Patient has received education on:  [x]Transfer Safety [x]Compensatory tech for ADLs []AE training []DME training []Energy Conservation []Home / Kitchen Safety  []Fall Prevention  []Other:    Family training was completed: yes    Recommendations: Home Health OT       Recommended DME:    Post Discharge Care:   []Home Independently  [x]Home with 24hr Care / Supervision []Home with Partial Supervision []Home with Home Health OT []Home with Out Pt OT []Other: ___   Comments:         Time in Time out Tx Time Breakdown  Variance:   First Session  10:00 10:45 [] Individual Tx-   [x] Concurrent Tx -39  [] Co-Tx -   [] Group Tx -   [] Time Missed -     Second Session   [] Individual Tx-  [] Concurrent Tx   [] Co-Tx -   [] Group Tx -   [] Time Missed -                   Third Session    [] Individual Tx-   [] Concurrent Tx -  [] Co-Tx -   [] Group Tx -   [] Time Missed -         Total Tx Time: 45 mins      Yuliana Salazar OTR/L [id number]

## 2020-07-09 NOTE — TELEPHONE ENCOUNTER
CLINICAL PHARMACY NOTE  Post-Discharge Transitions of Care (JEREMIAH)    Non-face-to-face services provided:  Assessment and support for treatment adherence and medication management-- med rec    Subjective/Objective:  James Sanchez is a 80 y.o. female. Patient was discharged from [facility] on [facility]2/20 with a diagnosis of Closed left hip fracture. Patient outreach to review discharge medications and provide medication review and management. Spoke with daughter. Therapy has started, is moving bowels, no issue right now. Pain is good, some swelling. Checking sugars daily, today 120, sugars have been good. Allergies   Allergen Reactions    Iodine     Iv Dye [Iodides]      Discharge Medications (as per current medication list/AVS):  There are NEW medications for you.  START taking them after you leave the hospital:  aspirin 81 MG chewable tablet Take 1 tablet by mouth daily     You told us you were taking these medications at home, but the amount or how often you take this medication has CHANGED:  na  These are medications you told us you were taking at home, CONTINUE taking them after you leave the hospital:  Medication Sig    predniSONE (DELTASONE) 5 MG tablet TAKE ONE TABLET BY MOUTH DAILY  - 2.5 mg once daily    clopidogrel (PLAVIX) 75 MG tablet TAKE ONE TABLET BY MOUTH DAILY    GLUCOTROL XL 5 MG extended release tablet TAKE ONE TABLET BY MOUTH DAILY    colchicine (COLCRYS) 0.6 MG tablet Take 1 tablet by mouth daily    potassium chloride (KLOR-CON M) 20 MEQ extended release tablet TAKE ONE TABLET BY MOUTH TWO TIMES A DAY  -no longer taking    bumetanide (BUMEX) 1 MG tablet Take 1 tablet by mouth daily    vitamin D (ERGOCALCIFEROL) 81263 units CAPS capsule TAKE ONE CAPSULE BY MOUTH ONCE A WEEK    carvedilol (COREG) 3.125 MG tablet TAKE ONE TABLET BY MOUTH TWO TIMES A DAY (Patient taking differently: 6.25 mg 2 times daily )  -6.25 BID    ONE TOUCH ULTRA TEST strip test blood sugar twice daily    sacubitril-valsartan (ENTRESTO) 24-26 MG per tablet Take 0.5 tablets by mouth 2 times daily   -1 tab daily    allopurinol (ZYLOPRIM) 100 MG tablet Take 1 tablet by mouth 2 times daily    metoclopramide (REGLAN) 5 MG tablet Take 0.5 tablets by mouth 3 times daily    esomeprazole (NEXIUM) 40 MG delayed release capsule Take 20 mg by mouth every morning (before breakfast)   - 20 mg daily prn     These are the medications you have told us you were taking at home, STOP taking them after you leave the hospital:  triamcinolone 0.1 % cream (KENALOG)    TCM Call Made?: Yes     Additional Medications:  acetaminophen    Estimated Creatinine Clearance: 26 mL/min (A) (based on SCr of 1.3 mg/dL (H)). Assessment/Plan:    - Pt is taking medications as directed by discharging physician. Number of discrepancies: 1. Instructions per discharge list provided except per below documentation. Identified medication discrepancies/issues:     · Category 4 (1):  1.  Updated med list    - Identified Potential Medication Interactions: No clinically significant interactions identified via [place] [employer] Interaction Analysis as category D or higher.    - Renal Dosing: No renal adjustments necessary.    - Follow up appointment date (7 days for more severe illness, 14 days for others):    · Patient reminded of upcoming appointment  · Patient encouraged to schedule follow up with PCP    Thank you,    Omar Mills, PharmD, [facility] Pharmacist  Direct: [phone], Ext [phone]  ==========================  CLINICAL PHARMACY NOTE   POST-DISCHARGE TELEPHONE FOLLOW-UP ADDENDUM    For Pharmacy Admin Tracking Only    TCM Call Made?: Yes  [facility] Select Patient?: Yes  Total # of Interventions Recommended: 1 - Updated Order #: 1  Total # Interventions Accepted: 1  Intervention Severity:   - Level 1 Intervention Present?: No   - Level 2 #: 0   - Level 3 #: 0  Outreach Status: Review Complete  Care Coordinator Outreach to Patient?: No  Provider Contacted?: Yes - Note Routed, Routine  Time Spent (min): 30    Additional Documentation:

## 2020-08-24 PROBLEM — D64.9 ANEMIA, UNSPECIFIED: Status: ACTIVE | Noted: 2020-01-01

## 2020-08-24 NOTE — ED PROVIDER NOTES
% 3.5 2.0 - 12.0 %    Eosinophils % 0.0 0.0 - 6.0 %    Basophils % 0.0 0.0 - 2.0 %    Neutrophils Absolute 8.63 (H) 1.80 - 7.30 E9/L    Lymphocytes Absolute 0.68 (L) 1.50 - 4.00 E9/L    Monocytes Absolute 0.39 0.10 - 0.95 E9/L    Eosinophils Absolute 0.00 (L) 0.05 - 0.50 E9/L    Basophils Absolute 0.00 0.00 - 0.20 E9/L    Promyelocytes Percent 0.9 0 - 0 %    Anisocytosis 1+     Polychromasia 3+     Hypochromia 2+     Poikilocytes 1+     Ovalocytes 1+     Target Cells 1+    Comprehensive Metabolic Panel   Result Value Ref Range    Sodium 135 132 - 146 mmol/L    Potassium 4.1 3.5 - 5.0 mmol/L    Chloride 99 98 - 107 mmol/L    CO2 19 (L) 22 - 29 mmol/L    Anion Gap 17 (H) 7 - 16 mmol/L    Glucose 241 (H) 74 - 99 mg/dL    BUN 31 (H) 8 - 23 mg/dL    CREATININE 1.4 (H) 0.5 - 1.0 mg/dL    GFR Non-African American 35 >=60 mL/min/1.73    GFR African American 42     Calcium 8.9 8.6 - 10.2 mg/dL    Total Protein 6.4 6.4 - 8.3 g/dL    Alb 3.7 3.5 - 5.2 g/dL    Total Bilirubin 0.4 0.0 - 1.2 mg/dL    Alkaline Phosphatase 121 (H) 35 - 104 U/L    ALT 8 0 - 32 U/L    AST 18 0 - 31 U/L   Brain Natriuretic Peptide   Result Value Ref Range    Pro-BNP 19,505 (H) 0 - 450 pg/mL   Protime-INR   Result Value Ref Range    Protime 12.2 9.3 - 12.4 sec    INR 1.1    POCT occult blood stool   Result Value Ref Range    OCCULT BLOOD FECAL NEGATIVE     QC OK? yes        RADIOLOGY:  Interpreted by Radiologist.  XR CHEST PORTABLE   Final Result   Cardiomegaly. The bibasilar infiltrates. Possible small   bilateral perfusions. EKG Interpretation    EKG: This EKG is signed and interpreted by me. Rate: 88  Rhythm: Sinus  Interpretation: non-specific EKG  Comparison: stable as compared to patient's most recent EKG    ------------------------- NURSING NOTES AND VITALS REVIEWED ---------------------------   The nursing notes within the ED encounter and vital signs as below have been reviewed by myself.   /62   Pulse 92   Temp 97.6 °F (36.4 °C)   Resp 18   Ht 5' 3\" (1.6 m)   Wt 160 lb (72.6 kg)   SpO2 94%   BMI 28.34 kg/m²   Oxygen Saturation Interpretation: Normal    The patients available past medical records and past encounters were reviewed. ------------------------------ ED COURSE/MEDICAL DECISION MAKING----------------------  Medications   0.9 % sodium chloride bolus (has no administration in time range)   furosemide (LASIX) injection 20 mg (has no administration in time range)             Medical Decision Making:        Re-Evaluations:             Re-evaluation. Patients symptoms show no change    Patient rechecked and in no distress. Patient made aware of findings and plan. Patient will be admitted Hemoccult was negative. Patient agreeable to transfusion. Consultations:             Call was placed to PCP    Critical Care: This patient's ED course included: a personal history and physicial eaxmination    This patient has been closely monitored during their ED course. Counseling: The emergency provider has spoken with the patient and discussed todays results, in addition to providing specific details for the plan of care and counseling regarding the diagnosis and prognosis. Questions are answered at this time and they are agreeable with the plan.       --------------------------------- IMPRESSION AND DISPOSITION ---------------------------------    IMPRESSION  1. Anemia, unspecified type    2. Congestive heart failure, unspecified HF chronicity, unspecified heart failure type ([facility]        DISPOSITION  Disposition: Admit to telemetry  Patient condition is stable        NOTE: This report was transcribed using voice recognition software.  Every effort was made to ensure accuracy; however, inadvertent computerized transcription errors may be present          Stefania Sinclair MD  08/24/20 0041       Stefania Sinclair MD  08/24/20 9881       Stefania Sinclair MD  08/24/20 3675

## 2020-08-24 NOTE — ED NOTES
PT ALERT ORIENTED SKIN WARM DRY RESP EASY PT C/O LEFT SIDED CHEST PAIN     Leighann Bass RN  08/24/20 8424

## 2020-08-24 NOTE — CONSULTS
[facility]  Hematology/Oncology  Consult      Patient Name: Christofer De La Paz  YOB: 1927  PCP: Cielo Stroud MD   Referring Provider:      Reason for Consultation:   Chief Complaint   Patient presents with    Shortness of Breath     reports sob x 1 week        History of Present Illness:  57-year-old woman well-known to me with history of chronic anemia multifactorial and related to intermittent GI blood losses secondary to gastritis and hiatal hernia as well as chronic kidney disease. She had required in the past parenteral IV iron back in 2019. She had sustained a fall in June with intertrochanteric fracture of left femur and had orthopedic fixation by Dr. Jorge Diamond in [facility] and then completed rehab. She presented to the emergency room with progressive dyspnea with severe fatigue and lightheadedness and was noted to be severely anemic with a hemoglobin of 5.3 and low MCV of 78. WBC count and platelet count were normal.  Her CMP showed a serum creatinine of 1.4 with an estimated GFR of 35 mL/min. Her proBNP was elevated at 19,505. She is status post transfusion with units of packed RBCs and feels much better. She denies any bleeding or dark stools.   Stools for occult blood was negative    Diagnostic Data:     Past Medical History:   Diagnosis Date    Arthritis     CHF (congestive heart failure) (HCC)     CKD (chronic kidney disease)     Diabetic eye exam ([facility] 05/21/2015    Dr Braden Atkins diabetic retinopathy    Hyperlipidemia     Hypertension     Iron deficiency anemia     Type II or unspecified type diabetes mellitus without mention of complication, not stated as uncontrolled     Ulcer of foot ([facility] 6/18/2013    Valvular heart disease        Patient Active Problem List    Diagnosis Date Noted    Anemia, unspecified 08/24/2020    Closed left hip fracture, initial encounter ([facility] 06/16/2020    Anemia 05/17/2019    OA (osteoarthritis) 12/07/2016    Vitamin D deficiency 12/07/2016    Abnormal nuclear stress test 10/31/2016    Cardiomyopathy ([address] 10/31/2016    VHD (valvular heart disease) 10/31/2016    Severe mitral regurgitation 10/31/2016    Nonrheumatic aortic valve stenosis 10/31/2016    Pulmonary HTN ([address] 10/31/2016    Chronic systolic congestive heart failure ([address] 10/31/2016    Essential hypertension 10/31/2016    Mixed hyperlipidemia 10/31/2016    CKD (chronic kidney disease) stage 3, GFR 30-59 ml/min ([address] 10/31/2016    Type 2 diabetes mellitus with complication, without long-term current use of insulin ([address] 10/31/2016    Arthritis 10/31/2016    H/O: gout 10/31/2016    Hiatal hernia 10/31/2016    Gastroesophageal reflux disease 10/31/2016    Gastric ulcer 10/31/2016    Gastric polyps 10/31/2016    Pyloric stenosis 10/31/2016    Chronic blood loss anemia 10/31/2016    Ulcer of foot ([address] 06/18/2013    PVD (peripheral vascular disease) with claudication ([address] 03/21/2013    Atherosclerosis of native arteries of the extremities with ulceration ([address] 03/21/2013        Past Surgical History:   Procedure Laterality Date    ADENOIDECTOMY      APPENDECTOMY      CARPAL TUNNEL RELEASE      CATARACT REMOVAL      CATARACT REMOVAL      CHOLECYSTECTOMY      DOPPLER ECHOCARDIOGRAPHY  06/07/2018    Dr Cali Bridges mitral regurg-mild tricuspid regurg-trace pulmonic regurg-trace to mild aortic regurg    ENDOSCOPY, COLON, DIAGNOSTIC      JOINT REPLACEMENT      BILAT KNEE    ROTATOR CUFF REPAIR      TONSILLECTOMY      TOTAL KNEE ARTHROPLASTY      TRANSESOPHAGEAL ECHOCARDIOGRAM  06/19/2019    DR Maldonado    UPPER GASTROINTESTINAL ENDOSCOPY  05/28/2015    UPPER GASTROINTESTINAL ENDOSCOPY  11/07/2017    DR JEFF SULLIVAN    US KIDNEY DUPLEX BILAT  09/17/2016    [facility]    VASCULAR SURGERY  4/18/2013    Right leg angiogram by DR Francis       Family History  History reviewed. No pertinent family history.     Social History    TOBACCO:   reports that she has never smoked. She has never used smokeless tobacco.  ETOH:   reports current alcohol use. Home Medications  Prior to Admission medications    Medication Sig Start Date End Date Taking? Authorizing Provider   predniSONE (DELTASONE) 5 MG tablet Take 2.5 mg by mouth every other day    Yes Historical Provider, MD   traMADol (ULTRAM) 50 MG tablet Take 50 mg by mouth every 6 hours as needed for Pain.    Yes Historical Provider, MD   aspirin 81 MG EC tablet Take 81 mg by mouth daily   Yes Historical Provider, MD   glipiZIDE (GLUCOTROL XL) 5 MG extended release tablet Take 5 mg by mouth daily   Yes Historical Provider, MD   colchicine (COLCRYS) 0.6 MG tablet Take 0.6 mg by mouth daily as needed for Pain   Yes Historical Provider, MD   vitamin D (ERGOCALCIFEROL) 1.25 MG (16644 UT) CAPS capsule Take 50,000 Units by mouth once a week Given Wednesday   Yes Historical Provider, MD   clopidogrel (PLAVIX) 75 MG tablet Take 75 mg by mouth daily   Yes Historical Provider, MD   Alum Hydroxide-Mag Carbonate (GAVISCON EXTRA RELIEF FORMULA) 508-475 MG/10ML SUSP Take 10 mLs by mouth daily as needed   Yes Historical Provider, MD   carvedilol (COREG) 3.125 MG tablet Take 3.125 mg by mouth 2 times daily (with meals)    Yes Historical Provider, MD   acetaminophen (TYLENOL) 500 MG tablet Take 1,000 mg by mouth every 6 hours as needed for Pain    Yes Historical Provider, MD   bumetanide (BUMEX) 1 MG tablet Take 1 tablet by mouth daily 10/18/19  Yes Historical Provider, MD   sacubitril-valsartan (ENTRESTO) 24-26 MG per tablet Take 1 tablet by mouth 2 times daily    Yes Historical Provider, MD   allopurinol (ZYLOPRIM) 100 MG tablet Take 100 mg by mouth 2 times daily  12/27/17  Yes Historical Provider, MD   metoclopramide (REGLAN) 5 MG tablet Take 0.5 tablets by mouth 3 times daily 11/7/17  Yes Kostas Perkins MD   esomeprazole (NEXIUM) 20 MG delayed release capsule Take 20 mg by mouth daily as needed (indigestion)    Yes Historical Provider, MD       Allergies  Allergies   Allergen Reactions    Iodine     Iv Dye [Iodides]        Review of Systems:    Relevant for fatigue, weakness, lightheadedness, dyspnea on mild exertion. Negative for bleeding or melena. Objective  BP (!) 145/90   Pulse 75   Temp 98.1 °F (36.7 °C) (Oral)   Resp 18   Ht 5' 3\" (1.6 m)   Wt 160 lb (72.6 kg)   SpO2 98%   BMI 28.34 kg/m²     Physical Exam:     General: AAO to person, place, time, in no acute distress,   Head and neck : PERRLA, EOMI . Oropharynx : Clear  Neck: no JVD,   Heart: Regular rate and regular rhythm,   Lungs: Clear to auscultation   Extremities: No edema,  Abdomen: Soft, non-tender;no masses, no organomegaly  Skin:  No rash. Neurologic:Cranial nerves grossly intact. No focal motor or sensory deficits .     Recent Laboratory Data-   Lab Results   Component Value Date    WBC 11.2 08/24/2020    HGB 7.4 (L) 08/24/2020    HCT 24.7 (L) 08/24/2020    MCV 80.2 08/24/2020     08/24/2020    LYMPHOPCT 9.3 (L) 08/24/2020    RBC 3.08 (L) 08/24/2020    MCH 24.0 (L) 08/24/2020    MCHC 30.0 (L) 08/24/2020    RDW 19.8 (H) 08/24/2020    NEUTOPHILPCT 79.4 08/24/2020    MONOPCT 9.9 08/24/2020    EOSPCT 1.2 02/20/2020    BASOPCT 0.2 08/24/2020    NEUTROABS 8.90 (H) 08/24/2020    LYMPHSABS 1.04 (L) 08/24/2020    MONOSABS 1.11 (H) 08/24/2020    EOSABS 0.04 (L) 08/24/2020    BASOSABS 0.02 08/24/2020       Lab Results   Component Value Date     08/24/2020    K 4.1 08/24/2020    CL 99 08/24/2020    CO2 19 (L) 08/24/2020    BUN 31 (H) 08/24/2020    CREATININE 1.4 (H) 08/24/2020    GLUCOSE 241 (H) 08/24/2020    CALCIUM 8.9 08/24/2020    PROT 6.4 08/24/2020    LABALBU 3.7 08/24/2020    BILITOT 0.4 08/24/2020    ALKPHOS 121 (H) 08/24/2020    AST 18 08/24/2020    ALT 8 08/24/2020    LABGLOM 35 08/24/2020    GFRAA 42 08/24/2020       Lab Results   Component Value Date    IRON 53 11/07/2017    TIBC 447 11/07/2017 Radiology-    Xr Chest Portable    Result Date: 2020  Patient MRN:  [id number] : 1927 Age: 80 years Gender: Female Order Date:  2020 12:50 AM TECHNIQUE/NUMBER OF IMAGES/COMPARISON/CLINICAL HISTORY: Chest AP 1 image one view Comparison Suly 3. Difficult breathing. FINDINGS: Heart is enlarged. Increased density seen in both lower lung base which can represent infiltrates. The cannot excluded a small bilateral pleural effusions. The patient is rotated to the left. Cardiomegaly. The bibasilar infiltrates. Possible small bilateral perfusions. ASSESSMENT/PLAN : 80year-old woman  Severe microcytic anemia multifactorial related to intermittent GI blood losses from history of chronic gastritis and hiatal hernia. She likely had operative blood loss with her recent left hip surgery at [facility].  Her anemia is also contributed to by chronic kidney disease. Iron studies, reticulocyte count, SPEP, LDH will be checked. She will eventually require additional parenteral IV iron as well as EPO supplements . CHF    We will follow. Sarbjit Matthews M.D., F.A.C.P.   Electronically signed 2020 at 12:47 PM

## 2020-08-24 NOTE — H&P
GASTROINTESTINAL ENDOSCOPY  11/07/2017    DR JEFF SULLIVAN    US KIDNEY DUPLEX BILAT  09/17/2016    [address] [facility]-normal    VASCULAR SURGERY  4/18/2013    Right leg angiogram by DR Francis       Medications Prior to Admission:    Medications Prior to Admission: predniSONE (DELTASONE) 5 MG tablet, Take 2.5 mg by mouth every other day   traMADol (ULTRAM) 50 MG tablet, Take 50 mg by mouth every 6 hours as needed for Pain. aspirin 81 MG EC tablet, Take 81 mg by mouth daily  glipiZIDE (GLUCOTROL XL) 5 MG extended release tablet, Take 5 mg by mouth daily  colchicine (COLCRYS) 0.6 MG tablet, Take 0.6 mg by mouth daily as needed for Pain  vitamin D (ERGOCALCIFEROL) 1.25 MG (83829 UT) CAPS capsule, Take 50,000 Units by mouth once a week Given Wednesday  clopidogrel (PLAVIX) 75 MG tablet, Take 75 mg by mouth daily  Alum Hydroxide-Mag Carbonate (GAVISCON EXTRA RELIEF FORMULA) 508-475 MG/10ML SUSP, Take 10 mLs by mouth daily as needed  carvedilol (COREG) 3.125 MG tablet, Take 3.125 mg by mouth 2 times daily (with meals)   acetaminophen (TYLENOL) 500 MG tablet, Take 1,000 mg by mouth every 6 hours as needed for Pain   bumetanide (BUMEX) 1 MG tablet, Take 1 tablet by mouth daily  sacubitril-valsartan (ENTRESTO) 24-26 MG per tablet, Take 1 tablet by mouth 2 times daily   allopurinol (ZYLOPRIM) 100 MG tablet, Take 100 mg by mouth 2 times daily   metoclopramide (REGLAN) 5 MG tablet, Take 0.5 tablets by mouth 3 times daily  esomeprazole (NEXIUM) 20 MG delayed release capsule, Take 20 mg by mouth daily as needed (indigestion)     Allergies:    Iodine and Iv dye [iodides]    Social History:    reports that she has never smoked. She has never used smokeless tobacco. She reports current alcohol use. She reports that she does not use drugs. Family History:   family history is not on file.     REVIEW OF SYSTEMS:  As above in the HPI, otherwise negative    PHYSICAL EXAM:    Vitals:  BP (!) 145/90   Pulse 75   Temp 98.1 °F (36.7 °C) (Oral)   Resp 18   Ht 5' 3\" (1.6 m)   Wt 160 lb (72.6 kg)   SpO2 98%   BMI 28.34 kg/m²     General:  Awake, alert, oriented X 3. Well developed, well nourished, well groomed. In moderate respiratory distress  Hard of hearing  HEENT:  Normocephalic, atraumatic. Pupils equal, round, reactive to light. No scleral icterus. No conjunctival injection. Looks pale no nasal discharge. Neck:  Supple  Heart: Regular rate and rhythm with systolic murmur at the apex   lungs: Diffuse Rales noted  Abdomen:   Bowel sounds present, soft, nontender, no masses, no organomegaly, no peritoneal signs  Extremities:  No clubbing, cyanosis,   1+ edema bilateral noted  Skin:  Warm and dry, no open lesions or rash  Neuro:  Cranial nerves 2-12 intact, no focal deficits  Breast: deferred  Rectal: deferred  Genitalia:  deferred    LABS data reviewed in detail      ASSESSMENT:    Acute systolic CHF  Anemia aggravating CHF  Anemia seems from blood loss from previous hip fracture and chronic GI losses  Patient was getting Aranesp and iron infusions at hematologist office  Valvular heart disease with mitral regurg  Peripheral arterial disease  Multiple comorbidities  Patient Active Problem List   Diagnosis    PVD (peripheral vascular disease) with claudication ([address]    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)    Ulcer of foot ([address]    Abnormal nuclear stress test    Cardiomyopathy ([address]    VHD (valvular heart disease)    Severe mitral regurgitation    Nonrheumatic aortic valve stenosis    Pulmonary HTN (HCC)    Chronic systolic congestive heart failure ([address]    Essential hypertension    Mixed hyperlipidemia    CKD (chronic kidney disease) stage 3, GFR 30-59 ml/min ([facility]    Type 2 diabetes mellitus with complication, without long-term current use of insulin ([facility])    Arthritis    H/O: gout    Hiatal hernia    Gastroesophageal reflux disease    Gastric ulcer    Gastric polyps  Pyloric stenosis    Chronic blood loss anemia    OA (osteoarthritis)    Vitamin D deficiency    Anemia    [facility]    Anemia, unspecified       PLAN:  In view of CHF, transfuse to keep hemoglobin at least 8 or above  Spoke with general surgery  Monitor labs  IV bumex  Cardiology/hematology consult  Spoke with the son over the phone    Anitha Gonzalez  2:07 PM  8/24/2020

## 2020-08-24 NOTE — ED NOTES
Pt alert oriented skin warm dry resp easy lungs cta pt currently denies sob or cp     eLighann Bass, ELIU  08/24/20 1919

## 2020-08-24 NOTE — ED NOTES
PT ALERT ORIENTED SKIN WARM DRY RESP EASY PT CURRENTLY DENIES CP BLOOD INFUSING WITHOUT INCIDENT     Jeimy Castaneda RN  08/24/20 [id number]

## 2020-08-25 NOTE — PROGRESS NOTES
Jordan Mcknight MD FACP   Jordan Mcknight        CHIEF COMPLAINT: Shortness of breath      HISTORY OF PRESENT ILLNESS:    80-year-old woman seen in the emergency room at [facility] earlier this morning  Spoke with ER physician  at the time of admission  Spoke with son over the telephone 8/24  Data reviewed in detail  Patient with complex medical history  Recently had left hip fracture traumatic requiring ORIF  Following that she was rehabilitated  Now presents with worsening shortness of breath  Patient with known history of systolic CHF on chronic basis associated with the mitral regurgitation  She denied fever or chills  Some leg swelling was reported  Patient was found to be in florid pulmonary edema at the time of admission  At the time of my evaluation she was feeling better  Transfused  Past Medical History:    Past Medical History:   Diagnosis Date    Arthritis     CHF (congestive heart failure) ([address]     CKD (chronic kidney disease)     Diabetic eye exam ([facility] 05/21/2015    Dr Immanuel Ayala diabetic retinopathy    Hyperlipidemia     Hypertension     Iron deficiency anemia     Type II or unspecified type diabetes mellitus without mention of complication, not stated as uncontrolled     Ulcer of foot ([facility] 6/18/2013    Valvular heart disease        Past Surgical History:    Past Surgical History:   Procedure Laterality Date    ADENOIDECTOMY      APPENDECTOMY      CARPAL TUNNEL RELEASE      CATARACT REMOVAL      CATARACT REMOVAL      CHOLECYSTECTOMY      DOPPLER ECHOCARDIOGRAPHY  06/07/2018    Dr Russell Regalado mitral regurg-mild tricuspid regurg-trace pulmonic regurg-trace to mild aortic regurg    ENDOSCOPY, COLON, DIAGNOSTIC      JOINT REPLACEMENT      BILAT KNEE    ROTATOR CUFF REPAIR      TONSILLECTOMY      TOTAL KNEE ARTHROPLASTY      TRANSESOPHAGEAL ECHOCARDIOGRAM  06/19/2019    DR Maldonado    UPPER GASTROINTESTINAL ENDOSCOPY  05/28/2015    UPPER GASTROINTESTINAL ENDOSCOPY  11/07/2017 DR JEFF SULLIVAN    US KIDNEY DUPLEX BILAT  09/17/2016    [facility] [facility]-normal    VASCULAR SURGERY  4/18/2013    Right leg angiogram by DR Francis       Medications Prior to Admission:    Medications Prior to Admission: predniSONE (DELTASONE) 5 MG tablet, Take 2.5 mg by mouth every other day   traMADol (ULTRAM) 50 MG tablet, Take 50 mg by mouth every 6 hours as needed for Pain. aspirin 81 MG EC tablet, Take 81 mg by mouth daily  glipiZIDE (GLUCOTROL XL) 5 MG extended release tablet, Take 5 mg by mouth daily  colchicine (COLCRYS) 0.6 MG tablet, Take 0.6 mg by mouth daily as needed for Pain  vitamin D (ERGOCALCIFEROL) 1.25 MG (07939 UT) CAPS capsule, Take 50,000 Units by mouth once a week Given Wednesday  clopidogrel (PLAVIX) 75 MG tablet, Take 75 mg by mouth daily  Alum Hydroxide-Mag Carbonate (GAVISCON EXTRA RELIEF FORMULA) 508-475 MG/10ML SUSP, Take 10 mLs by mouth daily as needed  carvedilol (COREG) 3.125 MG tablet, Take 3.125 mg by mouth 2 times daily (with meals)   acetaminophen (TYLENOL) 500 MG tablet, Take 1,000 mg by mouth every 6 hours as needed for Pain   bumetanide (BUMEX) 1 MG tablet, Take 1 tablet by mouth daily  sacubitril-valsartan (ENTRESTO) 24-26 MG per tablet, Take 1 tablet by mouth 2 times daily   allopurinol (ZYLOPRIM) 100 MG tablet, Take 100 mg by mouth 2 times daily   metoclopramide (REGLAN) 5 MG tablet, Take 0.5 tablets by mouth 3 times daily  esomeprazole (NEXIUM) 20 MG delayed release capsule, Take 20 mg by mouth daily as needed (indigestion)     Allergies:    Iodine and Iv dye [iodides]    Social History:    reports that she has never smoked. She has never used smokeless tobacco. She reports current alcohol use. She reports that she does not use drugs. Family History:   family history is not on file.     REVIEW OF SYSTEMS:  As above in the HPI, otherwise negative    PHYSICAL EXAM:    Vitals:  BP (!) 97/57   Pulse 80   Temp 97.8 °F (36.6 °C) (Temporal)   Resp 20   Ht 5' 3\"

## 2020-08-25 NOTE — PROGRESS NOTES
Patient informed of being moved to a private room, 52[address]. Patient does not want to be moved to a private room. Patient feels safer with a roommate. Patient and patient's daughter Vish Robertson were ok with patient not moving. Called bed coordinators to cancel the transfer.

## 2020-08-25 NOTE — CONSULTS
CARDIOLOGY CONSULTATION    Patient Name:  Donnell Wang    :  1927    Reason for Consultation:   Mitral regurgitation - severe; coronary artery disease    History of Present Illness:   Donnell Wang presents to [facility] following a recent history of sudden increase in shortness of breath with any form of exertion and now simply at rest.  She has a long-standing history of anemia and upon arrival to the emergency room was found to have a hemoglobin of nearly 5 g. She is been seen in consultation by Dr. Sharad Orellana and has already received 2 units of packed cells. She notes an improvement in her overall sense of well-being. Unfortunately she has an underlying substrate of severe mitral regurgitation for which he was not deemed a candidate for a kaylyn- clip procedure. Of note is that she did experience chest discomfort within the past 48 hours prior to receiving her blood transfusion and has no further episodes at this time. Likewise she is presently on clopidogrel and aspirin in addition to prednisone which he takes every other day. Past Medical History:   has a past medical history of Arthritis, CHF (congestive heart failure) ([address] CKD (chronic kidney disease), Diabetic eye exam ([address] Hyperlipidemia, Hypertension, Iron deficiency anemia, Type II or unspecified type diabetes mellitus without mention of complication, not stated as uncontrolled, Ulcer of foot ([address] and Valvular heart disease. Surgical History:   has a past surgical history that includes Appendectomy; Cholecystectomy; Carpal tunnel release; Cataract removal; Rotator cuff repair; Tonsillectomy; vascular surgery (2013); Upper gastrointestinal endoscopy (2015); joint replacement; Endoscopy, colon, diagnostic; us kidney duplex bilat (2016); Adenoidectomy; Total knee arthroplasty;  Cataract removal; Upper gastrointestinal endoscopy (2017); doppler echocardiography (2018); Skin color, texture, turgor normal. No rashes or lesions. No induration or tightening palpated. Head: Normocephalic. No masses, lesions, tenderness or abnormalities  Eyes: Conjunctivae/corneas clear. PERRL, EOMs intact. Sclera non icteric. Ears: External ears normal. Canals clear. TM's clear bilaterally. Hearing normal to finger rub. Nose/Sinuses: Nares normal. Septum midline. Mucosa normal. No drainage or sinus tenderness. Oropharynx: Lips, mucosa, and tongue normal. Oropharynx clear with no exudate seen. Neck: Neck supple and symmetric. No adenopathy. Thyroid symmetric, normal size, without nodules. Trachea is midline. Carotids brisk in upstroke without bruits, no abnormal JVP noted at 45°. Chest: Even excursion  Lungs: Lungs   Grosslyclear to auscultation bilaterally. No retractions or use of accessory muscles. No tactile vocal fremitus. No rhonchi, crackles or rales. Heart:  S1 > S2. Regular rhythm. No gallop Grade 2-3/6. Blowing systolic murmur heard at the apex radiating to the axilla. No rub, palpable thrill or heave noted. PMI 5th intercostal space midclavicular line. Abdomen: Abdomen soft, moderately protuberant, non-tender. BS normal. No masses, organomegaly. No hernia noted. Extremities: Extremities normal. No deformities, edema, or skin discoloration. No cyanosis or clubbing noted to the nails. Peripheral pulses present 2+ upper extremities and present 1+  lower extremities. Musculoskeletal: Spine ROM normal. Muscular strength intact. Neuro: Cranial nerves intact. Motor: Strength 5/5 in all extremities. Reflexes 2+ in all extremities. No focal weakness. Sensory: grossly normal to touch. Coordination intact.     Pertinent Labs:  CBC:   Recent Labs     08/24/20  0100 08/24/20  0935 08/24/20  2113   WBC 9.7 11.2  --    HGB 5.3* 7.4* 8.1*    283  --      BMP:  Recent Labs     08/24/20  0100      K 4.1   CL 99   CO2 19*   BUN 31*   CREATININE 1.4*   GLUCOSE 241*   LABGLOM 35     ABGs: you, Betty Vanegas MD for allowing me to consult in the care of this patient. Vandana Duncan DO, FACP, FACC, [facility]    NOTE:  This report was transcribed using voice recognition software. Every effort was made to ensure accuracy; however, inadvertent computerized transcription errors may be present.

## 2020-08-25 NOTE — CONSULTS
Department of General Surgery - Adult  Surgical Service   Attending Consult Note      Reason for Consult:  Blood loss  Requesting Physician:  Navneet Gomez MD    CHIEF COMPLAINT:  Fatigue weakness    History Obtained From:  patient, electronic medical record    HISTORY OF PRESENT ILLNESS:                The patient is a 80 y.o. female who presents with fatigue and weakness with low HGB.     Past Medical History:        Diagnosis Date    Arthritis     CHF (congestive heart failure) (HCC)     CKD (chronic kidney disease)     Di[facility] exam [address] 05/21/2015    Dr Amelie Holman diabetic retinopathy    Hyperlipidemia     Hypertension     Iron deficiency anemia     Type II or unspecified type diabetes mellitus without mention of complication, not stated as uncontrolled     Ulcer of foot ([facility] 6/18/2013    Valvular heart disease      Past Surgical History:        Procedure Laterality Date    ADENOIDECTOMY      APPENDECTOMY      CARPAL TUNNEL RELEASE      CATARACT REMOVAL      CATARACT REMOVAL      CHOLECYSTECTOMY      DOPPLER ECHOCARDIOGRAPHY  06/07/2018    Dr Jimenez Brought mitral regurg-mild tricuspid regurg-trace pulmonic regurg-trace to mild aortic regurg    ENDOSCOPY, COLON, DIAGNOSTIC      JOINT REPLACEMENT      BILAT KNEE    ROTATOR CUFF REPAIR      TONSILLECTOMY      TOTAL KNEE ARTHROPLASTY      TRANSESOPHAGEAL ECHOCARDIOGRAM  06/19/2019    DR Maldonado    UPPER GASTROINTESTINAL ENDOSCOPY  05/28/2015    UPPER GASTROINTESTINAL ENDOSCOPY  11/07/2017    DR JEFF SULLIVAN    US KIDNEY DUPLEX BILAT  09/17/2016    [facility]  Imaging-normal    VASCULAR SURGERY  4/18/2013    Right leg angiogram by DR Francis     Current Medications:   Current Facility-Administered Medications: 0.9 % sodium chloride bolus, 20 mL, Intravenous, Once  potassium chloride (KLOR-CON M) extended release tablet 40 mEq, 40 mEq, Oral, Once  acetaminophen (TYLENOL) tablet 500 mg, 500 mg, Oral, Q6H PRN  allopurinol (ZYLOPRIM) tablet 100 mg, 100 mg, Oral, BID  carvedilol (COREG) tablet 3.125 mg, 3.125 mg, Oral, BID WC  pantoprazole (PROTONIX) tablet 40 mg, 40 mg, Oral, QAM AC  glipiZIDE (GLUCOTROL) tablet 5 mg, 5 mg, Oral, QAM AC  sacubitril-valsartan (ENTRESTO) 24-26 MG per tablet 1 tablet, 1 tablet, Oral, BID  traMADol (ULTRAM) tablet 50 mg, 50 mg, Oral, Q6H PRN  vitamin D (ERGOCALCIFEROL) capsule 50,000 Units, 50,000 Units, Oral, Weekly  bumetanide (BUMEX) injection 1 mg, 1 mg, Intravenous, BID  glucose (GLUTOSE) 40 % oral gel 15 g, 15 g, Oral, PRN  dextrose 50 % IV solution, 12.5 g, Intravenous, PRN  glucagon (rDNA) injection 1 mg, 1 mg, Intramuscular, PRN  dextrose 5 % solution, 100 mL/hr, Intravenous, PRN  predniSONE (DELTASONE) tablet 2.5 mg, 2.5 mg, Oral, Every Other Day  0.9 % sodium chloride bolus, 20 mL, Intravenous, Once  insulin lispro (HUMALOG) injection vial 0-12 Units, 0-12 Units, Subcutaneous, TID WC  insulin lispro (HUMALOG) injection vial 0-6 Units, 0-6 Units, Subcutaneous, Nightly  Allergies:  Iodine and Iv dye [iodides]    Social History:   N/C  Family History:   History reviewed. No pertinent family history. REVIEW OF SYSTEMS:    H&P    PHYSICAL EXAM:    VITALS:  BP (!) 96/52   Pulse 78   Temp 98.2 °F (36.8 °C) (Oral)   Resp 18   Ht 5' 3\" (1.6 m)   Wt 160 lb (72.6 kg)   SpO2 95%   BMI 28.34 kg/m²   24HR INTAKE/OUTPUT:    Intake/Output Summary (Last 24 hours) at 2020 1010  Last data filed at 2020 1839  Gross per 24 hour   Intake 820 ml   Output --   Net 820 ml     TEMPERATURE:  Current - Temp: 98.2 °F (36.8 °C);  Max - Temp  Av.1 °F (36.7 °C)  Min: 97.5 °F (36.4 °C)  Max: 98.8 °F (37.1 °C)  RESPIRATIONS RANGE: Resp  Av.3  Min: 16  Max: 18  PULSE RANGE: Pulse  Av.1  Min: 75  Max: 94  BLOOD PRESSURE RANGE:  Systolic (41LCU), KPI:639 , Min:96 , WD   ; Diastolic (00LKY), PKJ:10, Min:52, Max:90    CONSTITUTIONAL:  fatigued, alert, cooperative, mild distress, appears stated age and normal weight  EYES:  lids and lashes normal, pupils equal, round and reactive to light, extra-ocular muscles intact, sclera clear and conjunctiva normal  NECK:  supple, symmetrical, trachea midline, skin normal and no stridor  LUNGS:  Moderate respiratory distress, moderate air exchange, no retractions and diminished breath sounds right base and left base  CARDIOVASCULAR:  normal apical pulses, regularly irregular rhythm and normal S1 and S2  ABDOMEN:  normal bowel sounds, soft, non-distended, non-tender, voluntary guarding absent and no masses palpated  DATA:    CBC with Differential:    Lab Results   Component Value Date    WBC 8.5 08/25/2020    RBC 3.34 08/25/2020    HGB 8.3 08/25/2020    HCT 26.8 08/25/2020     08/25/2020    MCV 80.2 08/25/2020    MCH 24.9 08/25/2020    MCHC 31.0 08/25/2020    RDW 19.9 08/25/2020    LYMPHOPCT 10.5 08/25/2020    PROMYELOPCT 0.9 08/24/2020    MONOPCT 10.5 08/25/2020    EOSPCT 1.2 02/20/2020    BASOPCT 0.1 08/25/2020    MONOSABS 0.89 08/25/2020    LYMPHSABS 0.89 08/25/2020    EOSABS 0.06 08/25/2020    BASOSABS 0.01 08/25/2020     CMP:    Lab Results   Component Value Date     08/25/2020    K 3.3 08/25/2020    K 4.7 06/26/2020     08/25/2020    CO2 25 08/25/2020    BUN 24 08/25/2020    CREATININE 1.3 08/25/2020    GFRAA 46 08/25/2020    LABGLOM 38 08/25/2020    GLUCOSE 77 08/25/2020    PROT 6.1 08/25/2020    LABALBU 3.5 08/25/2020    CALCIUM 8.7 08/25/2020    BILITOT 1.0 08/25/2020    ALKPHOS 112 08/25/2020    AST 14 08/25/2020    ALT <5 08/25/2020       IMPRESSION/RECOMMENDATIONS: uncompensated blood loss  Anemia with hemodynamic decompensation  Senescence cardiomyopathy associated with valvular disease  Restore intravascular volume  Plan further workup as clinically indicated

## 2020-08-25 NOTE — PROGRESS NOTES
reviewed. No pertinent family history. Social History    TOBACCO:   reports that she has never smoked. She has never used smokeless tobacco.  ETOH:   reports current alcohol use. Home Medications  Prior to Admission medications    Medication Sig Start Date End Date Taking? Authorizing Provider   predniSONE (DELTASONE) 5 MG tablet Take 2.5 mg by mouth every other day    Yes Historical Provider, MD   traMADol (ULTRAM) 50 MG tablet Take 50 mg by mouth every 6 hours as needed for Pain.    Yes Historical Provider, MD   aspirin 81 MG EC tablet Take 81 mg by mouth daily   Yes Historical Provider, MD   glipiZIDE (GLUCOTROL XL) 5 MG extended release tablet Take 5 mg by mouth daily   Yes Historical Provider, MD   colchicine (COLCRYS) 0.6 MG tablet Take 0.6 mg by mouth daily as needed for Pain   Yes Historical Provider, MD   vitamin D (ERGOCALCIFEROL) 1.25 MG (45718 UT) CAPS capsule Take 50,000 Units by mouth once a week Given Wednesday   Yes Historical Provider, MD   clopidogrel (PLAVIX) 75 MG tablet Take 75 mg by mouth daily   Yes Historical Provider, MD   Alum Hydroxide-Mag Carbonate (GAVISCON EXTRA RELIEF FORMULA) 508-475 MG/10ML SUSP Take 10 mLs by mouth daily as needed   Yes Historical Provider, MD   carvedilol (COREG) 3.125 MG tablet Take 3.125 mg by mouth 2 times daily (with meals)    Yes Historical Provider, MD   acetaminophen (TYLENOL) 500 MG tablet Take 1,000 mg by mouth every 6 hours as needed for Pain    Yes Historical Provider, MD   bumetanide (BUMEX) 1 MG tablet Take 1 tablet by mouth daily 10/18/19  Yes Historical Provider, MD   sacubitril-valsartan (ENTRESTO) 24-26 MG per tablet Take 1 tablet by mouth 2 times daily    Yes Historical Provider, MD   allopurinol (ZYLOPRIM) 100 MG tablet Take 100 mg by mouth 2 times daily  12/27/17  Yes Historical Provider, MD   metoclopramide (REGLAN) 5 MG tablet Take 0.5 tablets by mouth 3 times daily 11/7/17  Yes Angel Sheridan MD   esomeprazole (017 dscout) 20 MG delayed release capsule Take 20 mg by mouth daily as needed (indigestion)    Yes Historical Provider, MD       Allergies  Allergies   Allergen Reactions    Iodine     Iv Dye [Iodides]        Review of Systems:    Relevant for fatigue, weakness, lightheadedness, dyspnea on mild exertion. Negative for bleeding or melena. Objective  BP (!) 97/57   Pulse 80   Temp 97.8 °F (36.6 °C) (Temporal)   Resp 20   Ht 5' 3\" (1.6 m)   Wt 160 lb (72.6 kg)   SpO2 98%   BMI 28.34 kg/m²     Physical Exam:     General: AAO to person, place, time, in no acute distress,   Head and neck : PERRLA, EOMI . Oropharynx : Clear  Neck: no JVD,   Heart: Regular rate and regular rhythm,   Lungs: Clear to auscultation   Extremities: No edema,  Abdomen: Soft, non-tender;no masses, no organomegaly  Skin:  No rash. Neurologic:Cranial nerves grossly intact. No focal motor or sensory deficits .     Recent Laboratory Data-   Lab Results   Component Value Date    WBC 8.5 08/25/2020    HGB 8.3 (L) 08/25/2020    HCT 26.8 (L) 08/25/2020    MCV 80.2 08/25/2020     08/25/2020    LYMPHOPCT 10.5 (L) 08/25/2020    RBC 3.34 (L) 08/25/2020    MCH 24.9 (L) 08/25/2020    MCHC 31.0 (L) 08/25/2020    RDW 19.9 (H) 08/25/2020    NEUTOPHILPCT 77.7 08/25/2020    MONOPCT 10.5 08/25/2020    EOSPCT 1.2 02/20/2020    BASOPCT 0.1 08/25/2020    NEUTROABS 6.57 08/25/2020    LYMPHSABS 0.89 (L) 08/25/2020    MONOSABS 0.89 08/25/2020    EOSABS 0.06 08/25/2020    BASOSABS 0.01 08/25/2020       Lab Results   Component Value Date     08/25/2020    K 3.3 (L) 08/25/2020     08/25/2020    CO2 25 08/25/2020    BUN 24 (H) 08/25/2020    CREATININE 1.3 (H) 08/25/2020    GLUCOSE 77 08/25/2020    CALCIUM 8.7 08/25/2020    PROT 6.1 (L) 08/25/2020    LABALBU 3.5 08/25/2020    BILITOT 1.0 08/25/2020    ALKPHOS 112 (H) 08/25/2020    AST 14 08/25/2020    ALT <5 08/25/2020    LABGLOM 38 08/25/2020    GFRAA 46 08/25/2020       Lab Results   Component Value Date    IRON 19 (L)

## 2020-08-26 NOTE — PROGRESS NOTES
Tanisha Park MD FACP   Tanisha Park        CHIEF COMPLAINT: Shortness of breath      HISTORY OF PRESENT ILLNESS:    77-year-old woman seen in the emergency room at [facility] earlier this morning  Spoke with ER physician  at the time of admission  Spoke with son over the telephone 8/24 8/26  Data reviewed in detail  Patient with complex medical history  Recently had left hip fracture traumatic requiring ORIF  Following that she was rehabilitated  Now presents with worsening shortness of breath  Patient with known history of systolic CHF on chronic basis associated with the mitral regurgitation  She denied fever or chills  Some leg swelling was reported  Patient was found to be in florid pulmonary edema at the time of admission  At the time of my evaluation she was feeling better  Transfused  Registered nurse at bedside  Past Medical History:    Past Medical History:   Diagnosis Date    Arthritis     [facility]est[facility]) [facility]     CKD (chronic kidney disease)     Diabetic eye exam ([facility] [address] 05/21/2015    Dr Juan Pablo Calvillo diabetic retinopathy    Hyperlipidemia     Hypertension     Iron deficiency anemia     Type II or unspecified type diabetes mellitus without mention of complication, not stated as uncontrolled     Ulcer of foot ([facility] [address] 6/18/2013    Valvular heart disease        Past Surgical History:    Past Surgical History:   Procedure Laterality Date    ADENOIDECTOMY      APPENDECTOMY      CARPAL TUNNEL RELEASE      CATARACT REMOVAL      CATARACT REMOVAL      CHOLECYSTECTOMY      DOPPLER ECHOCARDIOGRAPHY  06/07/2018    Dr Sawyer Evangelista mitral regurg-mild tricuspid regurg-trace pulmonic regurg-trace to mild aortic regurg    ENDOSCOPY, COLON, DIAGNOSTIC      JOINT REPLACEMENT      BILAT KNEE    ROTATOR CUFF REPAIR      TONSILLECTOMY      TOTAL KNEE ARTHROPLASTY      TRANSESOPHAGEAL ECHOCARDIOGRAM  06/19/2019    DR Maldonado    UPPER GASTROINTESTINAL ENDOSCOPY  05/28/2015    UPPER GASTROINTESTINAL ENDOSCOPY  11/07/2017    DR JEFF SULLIVAN    US KIDNEY DUPLEX BILAT  09/17/2016    [address] [facility]-normal    VASCULAR SURGERY  4/18/2013    Right leg angiogram by DR Francis       Medications Prior to Admission:    Medications Prior to Admission: predniSONE (DELTASONE) 5 MG tablet, Take 2.5 mg by mouth every other day   traMADol (ULTRAM) 50 MG tablet, Take 50 mg by mouth every 6 hours as needed for Pain. aspirin 81 MG EC tablet, Take 81 mg by mouth daily  glipiZIDE (GLUCOTROL XL) 5 MG extended release tablet, Take 5 mg by mouth daily  colchicine (COLCRYS) 0.6 MG tablet, Take 0.6 mg by mouth daily as needed for Pain  vitamin D (ERGOCALCIFEROL) 1.25 MG (20471 UT) CAPS capsule, Take 50,000 Units by mouth once a week Given Wednesday  clopidogrel (PLAVIX) 75 MG tablet, Take 75 mg by mouth daily  Alum Hydroxide-Mag Carbonate (GAVISCON EXTRA RELIEF FORMULA) 508-475 MG/10ML SUSP, Take 10 mLs by mouth daily as needed  carvedilol (COREG) 3.125 MG tablet, Take 3.125 mg by mouth 2 times daily (with meals)   acetaminophen (TYLENOL) 500 MG tablet, Take 1,000 mg by mouth every 6 hours as needed for Pain   bumetanide (BUMEX) 1 MG tablet, Take 1 tablet by mouth daily  sacubitril-valsartan (ENTRESTO) 24-26 MG per tablet, Take 1 tablet by mouth 2 times daily   allopurinol (ZYLOPRIM) 100 MG tablet, Take 100 mg by mouth 2 times daily   metoclopramide (REGLAN) 5 MG tablet, Take 0.5 tablets by mouth 3 times daily  esomeprazole (NEXIUM) 20 MG delayed release capsule, Take 20 mg by mouth daily as needed (indigestion)     Allergies:    Iodine and Iv dye [iodides]    Social History:    reports that she has never smoked. She has never used smokeless tobacco. She reports current alcohol use. She reports that she does not use drugs. Family History:   family history is not on file.     REVIEW OF SYSTEMS:  As above in the HPI, otherwise negative    PHYSICAL EXAM:    Vitals:  /65   Pulse 78   Temp 97.9 °F (36.6 °C) (Temporal)   Resp 20   Ht 5' 3\" (1.6 m)   Wt 160 lb (72.6 kg)   SpO2 97%   BMI 28.34 kg/m²     General:  Awake, alert, oriented X 3. Well developed, well nourished, well groomed. In moderate respiratory distress  Hard of hearing  HEENT:  Normocephalic, atraumatic. Pupils equal, round, reactive to light. No scleral icterus. No conjunctival injection. Looks pale no nasal discharge. Neck:  Supple  Heart: Regular rate and rhythm with systolic murmur at the apex   lungs: Diffuse Rales noted  Abdomen:   Bowel sounds present, soft, nontender, no masses, no organomegaly, no peritoneal signs  Extremities:  No clubbing, cyanosis,   Edema resolved  Skin:  Warm and dry, no open lesions or rash  Neuro:  Cranial nerves 2-12 intact, no focal deficits  Breast: deferred  Rectal: deferred  Genitalia:  deferred    LABS data reviewed in detail      ASSESSMENT:    Acute systolic CHF  Anemia aggravating CHF  Anemia seems from blood loss from previous hip fracture and chronic GI losses  Patient was getting Aranesp and iron infusions at hematologist office  Valvular heart disease with mitral regurg  Peripheral arterial disease  Multiple comorbidities  Patient Active Problem List   Diagnosis    PVD (peripheral vascular disease) with claudication ([address]    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)    Ulcer of foot ([address]    Abnormal nuclear stress test    Cardiomyopathy ([address]    VHD (valvular heart disease)    Severe mitral regurgitation    Nonrheumatic aortic valve stenosis    Pulmonary HTN (HCC)    Chronic systolic congestive heart failure ([address]    Essential hypertension    Mixed hyperlipidemia    CKD (chronic kidney disease) stage 3, GFR 30-59 ml/min (HCC)    Type 2 diabetes mellitus with complication, without long-term current use of insulin (HCC)    Arthritis    H/O: gout    Hiatal hernia    Gastroesophageal reflux disease    Gastric ulcer    Gastric polyps    Pyloric

## 2020-08-26 NOTE — PROGRESS NOTES
Occupational Therapy  OCCUPATIONAL THERAPY INITIAL EVALUATION      Date:2020  Patient Name: Catrachita Rae  MRN: [id number]  : 1927  Room: [address] [place] [address]    Referring Provider:  Bee Costa MD     Evaluating OT: King Jael VELOZR/L #[id number]     AM-PAC Daily Activity Raw Score:     Recommended Adaptive Equipment:  TBD     Diagnosis: Anemia  Patient presented to the hospital with SOB x1 week. Pt with L femur fx in 2020 requiring surgery; WBAT L LE. Discharged from ARU in July. Pertinent Medical History:    Past Medical History:   Diagnosis Date    Arthritis     CHF (congestive heart failure) (HCC)     CKD (chronic kidney disease)     Diabetic eye exam ([facility] [address] 2015    Dr Amelie Holman diabetic retinopathy    Hyperlipidemia     Hypertension     Iron deficiency anemia     Type II or unspecified type diabetes mellitus without mention of complication, not stated as uncontrolled     Ulcer of foot ([facility] [address] 2013    Valvular heart disease       Precautions:  Falls, 2L O2 via [place], [place]     Home Living: Pt lives with daughter in a 1 story home with 3 SOCRATES and 1 hand rail.     Bathroom setup: walk-in shower with shower chair   Equipment owned: w/w, w/c    Prior Level of Function: mod I with ADLs , assist with IADLs; ambulated with w/w (increased assist with adls recently due to SOB)  Driving: no  Occupation: na    Pain Level: Pt denies pain this session    Cognition: A&O: 4/4; Follows 2 step directions   Memory:  good   Sequencing:  good   Problem solving:  fair   Judgement/safety:  fair     Functional Assessment:   Initial Eval Status  Date: 20 Treatment Status  Date: Short Term Goals = Long Term Goals  Treatment frequency: 1-4x/wk; PRN   Feeding Independent       Grooming Stand by Assist    Standing at sink   Modified [place]    UB Dressing Stand by Assist   Modified [place]    LB Dressing Moderate Assist   Modified [place]    Bathing Moderate Assist  Modified [place] Toileting Stand by Assist   Modified [place]    Bed Mobility  Supine to sit: Stand by Assist   Sit to supine: Stand by Assist   Supine to sit: Modified [place]   Sit to supine: Modified [place]    Functional Transfers Stand by Assist   Modified [place]    Functional Mobility Stand by Assist     Ambulated in room and hallway with w/w;  O2 sats 99%; + SOB  Modified [place]    Balance Sitting:     Static:  sup    Dynamic:sup  Standing: SBA     Activity Tolerance F-    Light activity; limited due to SOB  F+   Visual/  Perceptual Glasses: yes  wfl                  Hand dominance: right      Strength ROM Additional Info:    RUE   4/5 wfl good  and wfl FMC/dexterity noted during ADL tasks     LUE 4/5 wfl good  and wfl FMC/dexterity noted during ADL tasks     Hearing: [place]  Sensation:wfl  Tone: wfl  Edema:none noted                             Comments: Upon arrival, patient supine in bed and agreeable to OT Session with PT collaboration - daughter present during session. Pt demonstrating fair understanding of education/techniques, requiring additional training / education. At end of session, patient semi-supine in bed with call light and phone within reach, all lines intact. Pt would benefit from continued skilled OT to increase safety,  independence and quality of life. Treatment:  OT services provided: Facilitation of bed mobility, unsupported sitting balance, functional transfers (various surfaces: bed, toilet), standing tolerance tasks (addressing posture, balance and activity tolerance while incorporating light functional reaching) and functional ambulation task with w/w in room and hallway (+ SOB; cuing on posture, w/w management and safety) - skilled cuing on sequencing, hand placement, posture, body mechanics, energy conservation techniques and safety.   Therapist facilitated self-care retraining: UB/LB self-care tasks (robe, socks), toileting task and standing grooming task at Lot  How much help for Toileting?: A Little  How much help for putting on and taking off regular upper body clothing?: A Little  How much help for taking care of personal grooming?: A Little  How much help for eating meals?: None  AM-PAC Inpatient Daily Activity Raw Score: 17  AM-PAC Inpatient ADL T-Scale Score : 37.26  ADL Inpatient CMS 0-100% Score: 50.11  ADL Inpatient CMS G-Code Modifier : CK       low Evaluation +     Treatment Time In:1345            Treatment Time Out: 1400              Treatment Charges: Mins Units   Ther Ex  25555     Manual Therapy 49769     Thera Activities 22162 6    ADL/Home Mgt 71789 9 1   Neuro Re-ed 28676     Group Therapy      Orthotic manage/training  77116     Non-Billable Time     Total Timed Treatment 15 1             Marylee Brisker OTR/L #[id number]

## 2020-08-26 NOTE — PROGRESS NOTES
Department of General Surgery - Adult  Surgical Service   Attending Progress Note      SUBJECTIVE:  Stable improved at baseline    OBJECTIVE  Hemoccult reported negative hgb improved with transfusion    Physical    VITALS:  /65   Pulse 78   Temp 97.9 °F (36.6 °C) (Temporal)   Resp 20   Ht 5' 3\" (1.6 m)   Wt 160 lb (72.6 kg)   SpO2 97%   BMI 28.34 kg/m²   INTAKE/OUTPUT:    Intake/Output Summary (Last 24 hours) at 2020 1055  Last data filed at 2020  Gross per 24 hour   Intake 836.67 ml   Output --   Net 836.67 ml     TEMPERATURE:  Current - Temp: 97.9 °F (36.6 °C);  Max - Temp  Av.7 °F (36.5 °C)  Min: 97.2 °F (36.2 °C)  Max: 98.2 °F (36.8 °C)  RESPIRATIONS RANGE: Resp  Av  Min: 20  Max: 20  PULSE RANGE: Pulse  Av  Min: 78  Max: 84  BLOOD PRESSURE RANGE:  Systolic (80NLV), VMA:522 , Min:90 , BPR:170   ; Diastolic (21IOQ), YHG:10, Min:48, Max:68    PULSE OXIMETRY RANGE: SpO2  Av.8 %  Min: 93 %  Max: 98 %  CONSTITUTIONAL:  awake, alert, cooperative, no apparent distress, appears stated age and normal weight  EYES:  lids and lashes normal, pupils equal, round and reactive to light and extra-ocular muscles intact  NECK:  supple, symmetrical, trachea midline, skin normal and no stridor  LUNGS:  no increased work of breathing, moderate air exchange, no retractions and diminished breath sounds right base and left base  CARDIOVASCULAR:  normal apical pulses, regularly irregular rhythm and normal S1 and S2  ABDOMEN:  normal bowel sounds, soft, non-distended, non-tender, voluntary guarding absent and no masses palpated  Data  CBC with Differential:    Lab Results   Component Value Date    WBC 8.9 2020    RBC 3.87 2020    HGB 9.6 2020    HCT 31.6 2020     2020    MCV 81.7 2020    MCH 24.8 2020    MCHC 30.4 2020    RDW 19.6 2020    LYMPHOPCT 16.4 2020    PROMYELOPCT 0.9 2020    MONOPCT 10.8 2020    EOSPCT 1.2 02/20/2020    BASOPCT 0.2 08/26/2020    MONOSABS 0.96 08/26/2020    LYMPHSABS 1.45 08/26/2020    EOSABS 0.12 08/26/2020    BASOSABS 0.02 08/26/2020     CMP:    Lab Results   Component Value Date     08/26/2020    K 3.7 08/26/2020    K 4.7 06/26/2020     08/26/2020    CO2 24 08/26/2020    BUN 23 08/26/2020    CREATININE 1.4 08/26/2020    GFRAA 42 08/26/2020    LABGLOM 35 08/26/2020    GLUCOSE 77 08/26/2020    PROT 5.9 08/26/2020    LABALBU 3.3 08/26/2020    CALCIUM 8.6 08/26/2020    BILITOT 0.7 08/26/2020    ALKPHOS 106 08/26/2020    AST 12 08/26/2020    ALT 6 08/26/2020       Current Inpatient Medications    Current Facility-Administered Medications: acetaminophen (TYLENOL) tablet 500 mg, 500 mg, Oral, Q6H PRN  allopurinol (ZYLOPRIM) tablet 100 mg, 100 mg, Oral, BID  carvedilol (COREG) tablet 3.125 mg, 3.125 mg, Oral, BID WC  pantoprazole (PROTONIX) tablet 40 mg, 40 mg, Oral, QAM AC  glipiZIDE (GLUCOTROL) tablet 5 mg, 5 mg, Oral, QAM AC  sacubitril-valsartan (ENTRESTO) 24-26 MG per tablet 1 tablet, 1 tablet, Oral, BID  traMADol (ULTRAM) tablet 50 mg, 50 mg, Oral, Q6H PRN  vitamin D (ERGOCALCIFEROL) capsule 50,000 Units, 50,000 Units, Oral, Weekly  bumetanide (BUMEX) injection 1 mg, 1 mg, Intravenous, BID  glucose (GLUTOSE) 40 % oral gel 15 g, 15 g, Oral, PRN  dextrose 50 % IV solution, 12.5 g, Intravenous, PRN  glucagon (rDNA) injection 1 mg, 1 mg, Intramuscular, PRN  dextrose 5 % solution, 100 mL/hr, Intravenous, PRN  predniSONE (DELTASONE) tablet 2.5 mg, 2.5 mg, Oral, Every Other Day  0.9 % sodium chloride bolus, 20 mL, Intravenous, Once  insulin lispro (HUMALOG) injection vial 0-12 Units, 0-12 Units, Subcutaneous, TID WC  insulin lispro (HUMALOG) injection vial 0-6 Units, 0-6 Units, Subcutaneous, Nightly    ASSESSMENT AND PLAN at baseline clinically  Hem note appreciated  May consider discharge and out patient follow up if clinically indicated

## 2020-08-26 NOTE — PROGRESS NOTES
[facility]  Hematology/Oncology        Patient Name: Kelly Gtz  YOB: 1927  PCP: Butch Nichols MD   Referring Provider:      Reason for Consultation:   Chief Complaint   Patient presents with    Shortness of Breath     reports sob x 1 week        Subjective: Continues to improve  She is status post transfusion with packed RBCs as well as 1 dose of Ferrlecit    History of Present Illness:  80year-old woman well-known to me with history of chronic anemia multifactorial and related to intermittent GI blood losses secondary to gastritis and hiatal hernia as well as chronic kidney disease. She had required in the past parenteral IV iron back in 2019. She had sustained a fall in June with intertrochanteric fracture of left femur and had orthopedic fixation by Dr. Cinthya Mcghee in [facility] and then completed rehab. She presented to the emergency room with progressive dyspnea with severe fatigue and lightheadedness and was noted to be severely anemic with a hemoglobin of 5.3 and low MCV of 78. WBC count and platelet count were normal.  Her CMP showed a serum creatinine of 1.4 with an estimated GFR of 35 mL/min. Her proBNP was elevated at 19,505. She is status post transfusion with units of packed RBCs and feels much better. She denies any bleeding or dark stools.   Stools for occult blood was negative    Diagnostic Data:     Past Medical History:   Diagnosis Date    Arthritis     CHF (congestive heart failure) (HCC)     CKD (chronic kidney disease)     Diabetic eye exam ([facility] [address] 05/21/2015    Dr Rosalie Pichardo diabetic retinopathy    Hyperlipidemia     Hypertension     Iron deficiency anemia     Type II or unspecified type diabetes mellitus without mention of complication, not stated as uncontrolled     Ulcer of foot ([facility] [address] 6/18/2013    Valvular heart disease        Patient Active Problem List    Diagnosis Date Noted    Anemia, unspecified 08/24/2020    Closed left hip fracture, initial encounter ([facility] [address] 06/16/2020    Anemia 05/17/2019    OA (osteoarthritis) 12/07/2016    Vitamin D deficiency 12/07/2016    Abnormal nuclear stress test 10/31/2016    Cardiomyopathy ([address] 10/31/2016    VHD (valvular heart disease) 10/31/2016    Severe mitral regurgitation 10/31/2016    Nonrheumatic aortic valve stenosis 10/31/2016    Pulmonary HTN ([address] 10/31/2016    Chronic systolic congestive heart failure ([address] 10/31/2016    Essential hypertension 10/31/2016    Mixed hyperlipidemia 10/31/2016    CKD (chronic kidney disease) stage 3, GFR 30-59 ml/min ([address] 10/31/2016    Type 2 diabetes mellitus with complication, without long-term current use of insulin ([address] 10/31/2016    Arthritis 10/31/2016    H/O: gout 10/31/2016    Hiatal hernia 10/31/2016    Gastroesophageal reflux disease 10/31/2016    Gastric ulcer 10/31/2016    Gastric polyps 10/31/2016    Pyloric stenosis 10/31/2016    Chronic blood loss anemia 10/31/2016    Ulcer of foot ([address] 06/18/2013    PVD (peripheral vascular disease) with claudication ([address] 03/21/2013    Atherosclerosis of native arteries of the extremities with ulceration ([address] 03/21/2013        Past Surgical History:   Procedure Laterality Date    ADENOIDECTOMY      APPENDECTOMY      CARPAL TUNNEL RELEASE      CATARACT REMOVAL      CATARACT REMOVAL      CHOLECYSTECTOMY      DOPPLER ECHOCARDIOGRAPHY  06/07/2018    Dr Ifeanyi Gonzalez mitral regurg-mild tricuspid regurg-trace pulmonic regurg-trace to mild aortic regurg    ENDOSCOPY, COLON, DIAGNOSTIC      JOINT REPLACEMENT      BILAT KNEE    ROTATOR CUFF REPAIR      TONSILLECTOMY      TOTAL KNEE ARTHROPLASTY      TRANSESOPHAGEAL ECHOCARDIOGRAM  06/19/2019    DR Maldonado    UPPER GASTROINTESTINAL ENDOSCOPY  05/28/2015    UPPER GASTROINTESTINAL ENDOSCOPY  11/07/2017    DR Supa SULLIVAN    US KIDNEY DUPLEX BILAT  09/17/2016    [facility]    VASCULAR SURGERY  4/18/2013    Right leg angiogram by DR Francis       Family History  History reviewed. No pertinent family history. Social History    TOBACCO:   reports that she has never smoked. She has never used smokeless tobacco.  ETOH:   reports current alcohol use. Home Medications  Prior to Admission medications    Medication Sig Start Date End Date Taking? Authorizing Provider   predniSONE (DELTASONE) 5 MG tablet Take 2.5 mg by mouth every other day    Yes Historical Provider, MD   traMADol (ULTRAM) 50 MG tablet Take 50 mg by mouth every 6 hours as needed for Pain.    Yes Historical Provider, MD   aspirin 81 MG EC tablet Take 81 mg by mouth daily   Yes Historical Provider, MD   glipiZIDE (GLUCOTROL XL) 5 MG extended release tablet Take 5 mg by mouth daily   Yes Historical Provider, MD   colchicine (COLCRYS) 0.6 MG tablet Take 0.6 mg by mouth daily as needed for Pain   Yes Historical Provider, MD   vitamin D (ERGOCALCIFEROL) 1.25 MG (78122 UT) CAPS capsule Take 50,000 Units by mouth once a week Given Wednesday   Yes Historical Provider, MD   clopidogrel (PLAVIX) 75 MG tablet Take 75 mg by mouth daily   Yes Historical Provider, MD   Alum Hydroxide-Mag Carbonate (GAVISCON EXTRA RELIEF FORMULA) 508-475 MG/10ML SUSP Take 10 mLs by mouth daily as needed   Yes Historical Provider, MD   carvedilol (COREG) 3.125 MG tablet Take 3.125 mg by mouth 2 times daily (with meals)    Yes Historical Provider, MD   acetaminophen (TYLENOL) 500 MG tablet Take 1,000 mg by mouth every 6 hours as needed for Pain    Yes Historical Provider, MD   bumetanide (BUMEX) 1 MG tablet Take 1 tablet by mouth daily 10/18/19  Yes Historical Provider, MD   sacubitril-valsartan (ENTRESTO) 24-26 MG per tablet Take 1 tablet by mouth 2 times daily    Yes Historical Provider, MD   allopurinol (ZYLOPRIM) 100 MG tablet Take 100 mg by mouth 2 times daily  12/27/17  Yes Historical Provider, MD   metoclopramide (REGLAN) 5 MG tablet Take 0.5 tablets by mouth 3 times daily 11/7/17  Yes Lab Results   Component Value Date    IRON 19 (L) 2020    TIBC 369 2020    FERRITIN 31 2020           Radiology-    Xr Chest Portable    Result Date: 2020  Patient MRN:  [id number] : 1927 Age: 80 years Gender: Female Order Date:  2020 12:50 AM TECHNIQUE/NUMBER OF IMAGES/COMPARISON/CLINICAL HISTORY: Chest AP 1 image one view Comparison Suly 3. Difficult breathing. FINDINGS: Heart is enlarged. Increased density seen in both lower lung base which can represent infiltrates. The cannot excluded a small bilateral pleural effusions. The patient is rotated to the left. Cardiomegaly. The bibasilar infiltrates. Possible small bilateral perfusions. ASSESSMENT/PLAN : 80year-old woman  Severe microcytic anemia multifactorial related to intermittent GI blood losses from history of chronic gastritis and hiatal hernia. She likely had operative blood loss with her recent left hip surgery at [facility].  Her anemia is also contributed to by chronic kidney disease. Iron studies, reticulocyte count, SPEP, LDH will be checked. She will eventually require additional parenteral IV iron as well as EPO supplements . CHF    - Hgb improved to 9.6  -Iron studies consistent with iron deficiency  SPEP pending  We will give a dose of EPO      Olinda Darling M.D.    Electronically signed 2020 at 1:07 PM

## 2020-08-26 NOTE — PROGRESS NOTES
Notified daughter Pao Muniz of patients room change to [id number]. Anel Estevez for yesterdays events. Very understanding of room change.  Amie Gilbert

## 2020-08-26 NOTE — PROGRESS NOTES
provided in accordance with the objectives noted above. Exercises and functional mobility practice will be used as well as appropriate assistive devices or modalities to obtain goals. Patient and family education will also be administered as needed. Frequency of treatments: 2-5x/week x 1-2 weeks. Time in  1336  Time out  1400    Total Treatment Time  20 minutes     Evaluation Time includes thorough review of current medical information, gathering information on past medical history/social history and prior level of function, completion of standardized testing/informal observation of tasks, assessment of data and education on plan of care and goals.     CPT codes:  [x] Low Complexity PT evaluation 43841  [] Moderate Complexity PT evaluation 86440  [] High Complexity PT evaluation 08032  [] PT Re-evaluation 22637  [] Gait training 28354 0 minutes  [] Manual therapy 33559 0 minutes  [x] Therapeutic activities 90903 20 minutes  [] Therapeutic exercises 31004 0 minutes  [] Neuromuscular reeducation 07422 0 minutes     Darius Ugarte, PT, DPT  [id number]

## 2020-08-27 NOTE — PROGRESS NOTES
Betty Vanegas MD   Progress notes        CHIEF COMPLAINT: Shortness of breath      HISTORY OF PRESENT ILLNESS:    70-year-old woman seen in the emergency room at [facility] earlier this morning  Spoke with ER physician  at the time of admission  Spoke with son over the telephone 8/24 8/26  Data reviewed in detail  Patient with complex medical history  Recently had left hip fracture traumatic requiring ORIF  Following that she was rehabilitated  Now presents with worsening shortness of breath  Patient with known history of systolic CHF on chronic basis associated with the mitral regurgitation  She denied fever or chills  Some leg swelling was reported  Patient was found to be in florid pulmonary edema at the time of admission  At the time of my evaluation she was feeling better  Transfused    Past Medical History:    Past Medical History:   Diagnosis Date    Arthritis     CHF (congestive heart failure) ([address]     CKD (chronic kidney disease)     Diabetic eye exam ([facility] [address] 05/21/2015    Dr Cm Parra diabetic retinopathy    Hyperlipidemia     Hypertension     Iron deficiency anemia     Type II or unspecified type diabetes mellitus without mention of complication, not stated as uncontrolled     Ulcer of foot ([facility] [address] 6/18/2013    Valvular heart disease        Past Surgical History:    Past Surgical History:   Procedure Laterality Date    ADENOIDECTOMY      APPENDECTOMY      CARPAL TUNNEL RELEASE      CATARACT REMOVAL      CATARACT REMOVAL      CHOLECYSTECTOMY      DOPPLER ECHOCARDIOGRAPHY  06/07/2018    Dr Mariia Britton mitral regurg-mild tricuspid regurg-trace pulmonic regurg-trace to mild aortic regurg    ENDOSCOPY, COLON, DIAGNOSTIC      JOINT REPLACEMENT      BILAT KNEE    ROTATOR CUFF REPAIR      TONSILLECTOMY      TOTAL KNEE ARTHROPLASTY      TRANSESOPHAGEAL ECHOCARDIOGRAM  06/19/2019    DR Maldonado    UPPER GASTROINTESTINAL ENDOSCOPY  05/28/2015    UPPER GASTROINTESTINAL ENDOSCOPY 11/07/2017    DR JEFF SULLIVAN    US KIDNEY DUPLEX BILAT  09/17/2016    [facility]-normal    VASCULAR SURGERY  4/18/2013    Right leg angiogram by DR Francis       Medications Prior to Admission:    Medications Prior to Admission: predniSONE (DELTASONE) 5 MG tablet, Take 2.5 mg by mouth every other day   traMADol (ULTRAM) 50 MG tablet, Take 50 mg by mouth every 6 hours as needed for Pain.  glipiZIDE (GLUCOTROL XL) 5 MG extended release tablet, Take 5 mg by mouth daily  colchicine (COLCRYS) 0.6 MG tablet, Take 0.6 mg by mouth daily as needed for Pain  vitamin D (ERGOCALCIFEROL) 1.25 MG (44225 UT) CAPS capsule, Take 50,000 Units by mouth once a week Given Wednesday  clopidogrel (PLAVIX) 75 MG tablet, Take 75 mg by mouth daily  Alum Hydroxide-Mag Carbonate (GAVISCON EXTRA RELIEF FORMULA) 508-475 MG/10ML SUSP, Take 10 mLs by mouth daily as needed  carvedilol (COREG) 3.125 MG tablet, Take 3.125 mg by mouth 2 times daily (with meals)   acetaminophen (TYLENOL) 500 MG tablet, Take 1,000 mg by mouth every 6 hours as needed for Pain   bumetanide (BUMEX) 1 MG tablet, Take 1 tablet by mouth daily  sacubitril-valsartan (ENTRESTO) 24-26 MG per tablet, Take 1 tablet by mouth 2 times daily   allopurinol (ZYLOPRIM) 100 MG tablet, Take 100 mg by mouth 2 times daily   esomeprazole (NEXIUM) 20 MG delayed release capsule, Take 20 mg by mouth daily as needed (indigestion)   [DISCONTINUED] aspirin 81 MG EC tablet, Take 81 mg by mouth daily  [DISCONTINUED] metoclopramide (REGLAN) 5 MG tablet, Take 0.5 tablets by mouth 3 times daily    Allergies:    Iodine and Iv dye [iodides]    Social History:    reports that she has never smoked. She has never used smokeless tobacco. She reports current alcohol use. She reports that she does not use drugs. Family History:   family history is not on file.     REVIEW OF SYSTEMS:  As above in the HPI, otherwise negative    PHYSICAL EXAM:    Vitals:  BP (!) 126/59   Pulse 91   Temp 97.3 °F (36.3 °C) (Temporal)   Resp 16   Ht 5' 3\" (1.6 m)   Wt 160 lb (72.6 kg)   SpO2 99%   BMI 28.34 kg/m²     General:  Awake, alert, oriented X 3. Well developed, well nourished, well groomed. In moderate respiratory distress  Hard of hearing  HEENT:  Normocephalic, atraumatic. Pupils equal, round, reactive to light. No scleral icterus. No conjunctival injection. Looks pale no nasal discharge. Neck:  Supple  Heart: Regular rate and rhythm with systolic murmur at the apex   lungs: Diffuse Rales noted  Abdomen:   Bowel sounds present, soft, nontender, no masses, no organomegaly, no peritoneal signs  Extremities:  No clubbing, cyanosis,   Edema resolved  Skin:  Warm and dry, no open lesions or rash  Neuro:  Cranial nerves 2-12 intact, no focal deficits  Breast: deferred  Rectal: deferred  Genitalia:  deferred    LABS data reviewed in detail      ASSESSMENT:    Acute systolic CHF  Anemia aggravating CHF  Anemia seems from blood loss from previous hip fracture and chronic GI losses  Patient was getting Aranesp and iron infusions at hematologist office  Valvular heart disease with mitral regurg  Peripheral arterial disease  Multiple comorbidities  Patient Active Problem List   Diagnosis    PVD (peripheral vascular disease) with claudication ([address]    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)    Ulcer of foot ([address]    Abnormal nuclear stress test    Cardiomyopathy ([address]    VHD (valvular heart disease)    Severe mitral regurgitation    Nonrheumatic aortic valve stenosis    Pulmonary HTN (HCC)    Chronic systolic congestive heart failure ([address]    Essential hypertension    Mixed hyperlipidemia    CKD (chronic kidney disease) stage 3, GFR 30-59 ml/min (HCC)    Type 2 diabetes mellitus with complication, without long-term current use of insulin (HCC)    Arthritis    H/O: gout    Hiatal hernia    Gastroesophageal reflux disease    Gastric ulcer    Gastric polyps    Pyloric stenosis    Chronic blood loss anemia    OA (osteoarthritis)    Vitamin D deficiency    Anemia    [facility]    Anemia, unspecified       PLAN:  In view of CHF, transfuse to keep hemoglobin at least 8 or abov  Cardiology/hematology consult appreciated  Spoke with the son over the phone 8/24 8/26 8/26  Spoke with cardiology 8/26  MI home  Jennifer  3:22 PM  8/27/2020

## 2020-08-27 NOTE — PROGRESS NOTES
[facility]  Hematology/Oncology        Patient Name: Lewis Hernández  YOB: 1927  PCP: Louise Chamberlain MD   Referring Provider:      Reason for Consultation:   Chief Complaint   Patient presents with    Shortness of Breath     reports sob x 1 week        Subjective: Feels well today. She is status post transfusion with packed RBCs as well as 1 dose of Ferrlecit    History of Present Illness:  80year-old woman well-known to me with history of chronic anemia multifactorial and related to intermittent GI blood losses secondary to gastritis and hiatal hernia as well as chronic kidney disease. She had required in the past parenteral IV iron back in 2019. She had sustained a fall in June with intertrochanteric fracture of left femur and had orthopedic fixation by Dr. Thom Kathleen in [place] and then completed rehab. She presented to the emergency room with progressive dyspnea with severe fatigue and lightheadedness and was noted to be severely anemic with a hemoglobin of 5.3 and low MCV of 78. WBC count and platelet count were normal.  Her CMP showed a serum creatinine of 1.4 with an estimated GFR of 35 mL/min. Her proBNP was elevated at 19,505. She is status post transfusion with units of packed RBCs and feels much better. She denies any bleeding or dark stools.   Stools for occult blood was negative    Diagnostic Data:     Past Medical History:   Diagnosis Date    Arthritis     CHF (congestive heart failure) (HCC)     CKD (chronic kidney disease)     Diabetic eye exam ([facility] [address] 05/21/2015    Dr Lilliana Crow diabetic retinopathy    Hyperlipidemia     Hypertension     Iron deficiency anemia     Type II or unspecified type diabetes mellitus without mention of complication, not stated as uncontrolled     Ulcer of foot ([facility] [address] 6/18/2013    Valvular heart disease        Patient Active Problem List    Diagnosis Date Noted    Anemia, unspecified 08/24/2020    Closed left hip fracture, initial encounter ([facility] [address] 06/16/2020    Anemia 05/17/2019    OA (osteoarthritis) 12/07/2016    Vitamin D deficiency 12/07/2016    Abnormal nuclear stress test 10/31/2016    Cardiomyopathy ([address] 10/31/2016    VHD (valvular heart disease) 10/31/2016    Severe mitral regurgitation 10/31/2016    Nonrheumatic aortic valve stenosis 10/31/2016    Pulmonary HTN ([address] 10/31/2016    Chronic systolic congestive heart failure ([address] 10/31/2016    Essential hypertension 10/31/2016    Mixed hyperlipidemia 10/31/2016    CKD (chronic kidney disease) stage 3, GFR 30-59 ml/min ([address] 10/31/2016    Type 2 diabetes mellitus with complication, without long-term current use of insulin ([address] 10/31/2016    Arthritis 10/31/2016    H/O: gout 10/31/2016    Hiatal hernia 10/31/2016    Gastroesophageal reflux disease 10/31/2016    Gastric ulcer 10/31/2016    Gastric polyps 10/31/2016    Pyloric stenosis 10/31/2016    Chronic blood loss anemia 10/31/2016    Ulcer of foot ([address] 06/18/2013    PVD (peripheral vascular disease) with claudication ([address] 03/21/2013    Atherosclerosis of native arteries of the extremities with ulceration ([address] 03/21/2013        Past Surgical History:   Procedure Laterality Date    ADENOIDECTOMY      APPENDECTOMY      CARPAL TUNNEL RELEASE      CATARACT REMOVAL      CATARACT REMOVAL      CHOLECYSTECTOMY      DOPPLER ECHOCARDIOGRAPHY  06/07/2018    Dr Evaristo Arizmendi mitral regurg-mild tricuspid regurg-trace pulmonic regurg-trace to mild aortic regurg    ENDOSCOPY, COLON, DIAGNOSTIC      JOINT REPLACEMENT      BILAT KNEE    ROTATOR CUFF REPAIR      TONSILLECTOMY      TOTAL KNEE ARTHROPLASTY      TRANSESOPHAGEAL ECHOCARDIOGRAM  06/19/2019    DR Maldonado    UPPER GASTROINTESTINAL ENDOSCOPY  05/28/2015    UPPER GASTROINTESTINAL ENDOSCOPY  11/07/2017    DR Pavel SULLIVAN    US KIDNEY DUPLEX BILAT  09/17/2016    [facility]    VASCULAR SURGERY  4/18/2013    Right leg angiogram by DR Francis       Family History  History reviewed. No pertinent family history. Social History    TOBACCO:   reports that she has never smoked. She has never used smokeless tobacco.  ETOH:   reports current alcohol use. Home Medications  Prior to Admission medications    Medication Sig Start Date End Date Taking? Authorizing Provider   metOLazone (ZAROXOLYN) 2.5 MG tablet Take 1 tablet by mouth three times a week 8/28/20  Yes David Jarrett DO   predniSONE (DELTASONE) 5 MG tablet Take 2.5 mg by mouth every other day    Yes Historical Provider, MD   traMADol (ULTRAM) 50 MG tablet Take 50 mg by mouth every 6 hours as needed for Pain.    Yes Historical Provider, MD   glipiZIDE (GLUCOTROL XL) 5 MG extended release tablet Take 5 mg by mouth daily   Yes Historical Provider, MD   colchicine (COLCRYS) 0.6 MG tablet Take 0.6 mg by mouth daily as needed for Pain   Yes Historical Provider, MD   vitamin D (ERGOCALCIFEROL) 1.25 MG (60250 UT) CAPS capsule Take 50,000 Units by mouth once a week Given Wednesday   Yes Historical Provider, MD   clopidogrel (PLAVIX) 75 MG tablet Take 75 mg by mouth daily   Yes Historical Provider, MD   Alum Hydroxide-Mag Carbonate (GAVISCON EXTRA RELIEF FORMULA) 508-475 MG/10ML SUSP Take 10 mLs by mouth daily as needed   Yes Historical Provider, MD   carvedilol (COREG) 3.125 MG tablet Take 3.125 mg by mouth 2 times daily (with meals)    Yes Historical Provider, MD   acetaminophen (TYLENOL) 500 MG tablet Take 1,000 mg by mouth every 6 hours as needed for Pain    Yes Historical Provider, MD   bumetanide (BUMEX) 1 MG tablet Take 1 tablet by mouth daily 10/18/19  Yes Historical Provider, MD   sacubitril-valsartan (ENTRESTO) 24-26 MG per tablet Take 1 tablet by mouth 2 times daily    Yes Historical Provider, MD   allopurinol (ZYLOPRIM) 100 MG tablet Take 100 mg by mouth 2 times daily  12/27/17  Yes Historical Provider, MD   esomeprazole (NEXIUM) 20 MG delayed release capsule Take 20 mg

## 2020-08-27 NOTE — CARE COORDINATION
Discharge orders noted. Dr Phan  requesting to see the patient prior to her discharge today per charge nurse. Spoke with patient and granddaughter at the bedside to discuss transition of care plans. Discussed HHC at discharge. Both patient and granddaughter Namrata referred CM to patient's daughter Juan M Ortiz. Call placed to patient's daughter Juan M Ortiz to discuss transition of care plans. After speaking with her brother, Juan M Ortiz stated they would prefer to not have [address] at this time. Discussed outpatient therapy with Juan M Diana also. Patient's daughter does not feel this is a good option for her mother at this time. Explained to Juan M Ortiz that if they should decide they do want [address] after discharge, they can have it arranged through Dr Leanna Alejo office. Juan M Diana expressed understanding. Anticipate transition to home this evening.      Carmina Menendez.  P:  [phone]

## 2020-08-27 NOTE — PROGRESS NOTES
This RN asked patient if she wanted to get cleaned up before she was discharged, she stated that she would just get cleaned up at home.

## 2020-08-27 NOTE — PROGRESS NOTES
PROGRESS NOTE       PATIENT PROBLEM LIST:  Active Problems:    Anemia, unspecified  Resolved Problems:    * No resolved hospital problems. *      SUBJECTIVE:  Kelly Gtz states she feels only slightly better and is still short of breath with any effort. She denies any chest discomfort. REVIEW OF SYSTEMS:  General ROS: negative for - fatigue, malaise,  weight gain or weight loss  Psychological ROS: negative for - anxiety , depression  Ophthalmic ROS: negative for - decreased vision or visual distortion. ENT ROS: negative  Allergy and Immunology ROS: negative  Hematological and Lymphatic ROS: positive for-anemia  Endocrine: no heat or cold intolerance and no polyphagia, polydipsia, or polyuria  Respiratory ROS: positive for - shortness of breath  Cardiovascular ROS: positive for - dyspnea on exertion, murmur and shortness of breath. Gastrointestinal ROS: no abdominal pain, change in bowel habits, or black or bloody stools  Genito-Urinary ROS: no nocturia, dysuria, trouble voiding, frequency or hematuria  Musculoskeletal ROS: negative for- myalgias, arthralgias, or claudication  Neurological ROS: no TIA or stroke symptoms otherwise no significant change in symptoms or problems since yesterday as documented in previous progress notes.     SCHEDULED MEDICATIONS:   allopurinol  100 mg Oral BID    carvedilol  3.125 mg Oral BID WC    pantoprazole  40 mg Oral QAM AC    glipiZIDE  5 mg Oral QAM AC    sacubitril-valsartan  1 tablet Oral BID    vitamin D  50,000 Units Oral Weekly    bumetanide  1 mg Intravenous BID    predniSONE  2.5 mg Oral Every Other Day    sodium chloride  20 mL Intravenous Once    insulin lispro  0-12 Units Subcutaneous TID WC    insulin lispro  0-6 Units Subcutaneous Nightly       VITAL SIGNS:                                                                                                                          BP (!)  120/65  Pulse 78  Temp 97.9 °F (36.6 °C) (Temporal)   Resp 2019      Cardiac Injury Profile: No results for input(s): CKTOTAL, CKMB, TROPONINI in the last 72 hours. Lipid Profile:   Lab Results   Component Value Date    TRIG 219 2018    HDL 43 2018    LDLCALC 118 2018    CHOL 205 2018      Hemoglobin A1C: No components found for: HGBA1C     RAD:   Xr Chest Portable    Result Date: 2020  Patient MRN:  [id number] : 1927 Age: 80 years Gender: Female Order Date:  2020 12:50 AM TECHNIQUE/NUMBER OF IMAGES/COMPARISON/CLINICAL HISTORY: Chest AP 1 image one view Comparison Suly 3. Difficult breathing. FINDINGS: Heart is enlarged. Increased density seen in both lower lung base which can represent infiltrates. The cannot excluded a small bilateral pleural effusions. The patient is rotated to the left. Cardiomegaly. The bibasilar infiltrates. Possible small bilateral perfusions. EKG: See Report  Echo: See Report      IMPRESSIONS:  Active Problems:    Anemia, unspecified  Resolved Problems:    * No resolved hospital problems. *      RECOMMENDATIONS:  I have observed Mrs. Yao Ready walking in the hallway with physical therapy and her aerobic capacity is obviously limited and can only walk perhaps 20 feet without experiencing symptoms of shortness of breath. Fortunately her oxygen saturation was 99% at the conclusion of her walk. I have reached out to her structural cardiologist at [facility] and he will determine if she is a potential candidate for percutaneous mitral valve replacement as she unfortunately did not turn out to be a candidate for the MitraClip secondary to her extensive mitral annular calcification which may limit the ability to replace her valve as well. I have discussed her cardiac findings in detail with both Kanika Guillory and her son Dr. Aris Lau.     I have spent more than 28 minutes face to face with George Bennett and reviewing notes and laboratory data, with greater than 50% of this time instructing and counseling the patient and her daughter who is at her bedside face to face regarding my findings and recommendations and I have answered all questions as posed to me by Ms. Adolfo Cortez and her daughter    Roxanne Schwarz,  FACP,FACC,FSCAI      NOTE:  This report was transcribed using voice recognition software.   Every effort was made to ensure accuracy; however, inadvertent computerized transcription errors may be present

## 2020-08-27 NOTE — PROGRESS NOTES
Department of General Surgery - Adult  Surgical Service   Attending Progress Note      SUBJECTIVE:  Stable feels improved    OBJECTIVE  hgb holding    Physical    VITALS:  BP (!) 92/46   Pulse 75   Temp 97.3 °F (36.3 °C) (Temporal)   Resp 16   Ht 5' 3\" (1.6 m)   Wt 160 lb (72.6 kg)   SpO2 99%   BMI 28.34 kg/m²   INTAKE/OUTPUT:    Intake/Output Summary (Last 24 hours) at 2020 1133  Last data filed at 2020 1542  Gross per 24 hour   Intake 240 ml   Output --   Net 240 ml     TEMPERATURE:  Current - Temp: 97.3 °F (36.3 °C);  Max - Temp  Av.3 °F (36.3 °C)  Min: 96.4 °F (35.8 °C)  Max: 98.2 °F (36.8 °C)  RESPIRATIONS RANGE: Resp  Av  Min: 16  Max: 18  PULSE RANGE: Pulse  Av.3  Min: 68  Max: 79  BLOOD PRESSURE RANGE:  Systolic (98GDV), XCA:952 , Min:92 , SHANKAR:573   ; Diastolic (47NSY), NNT:27, Min:43, Max:67    CONSTITUTIONAL:  fatigued, alert, cooperative, no apparent distress, appears stated age and normal weight  EYES:  lids and lashes normal, pupils equal, round and reactive to light and extra-ocular muscles intact  NECK:  supple, symmetrical, trachea midline, skin normal and no stridor  LUNGS:  no increased work of breathing, moderate air exchange, no retractions and diminished breath sounds right base and left base  CARDIOVASCULAR:  normal apical pulses, regularly irregular rhythm and normal S1 and S2  ABDOMEN:  normal bowel sounds, soft, non-distended, non-tender, voluntary guarding absent and no masses palpated  Data  CBC with Differential:    Lab Results   Component Value Date    WBC 8.7 2020    RBC 3.91 2020    HGB 9.7 2020    HCT 31.9 2020     2020    MCV 81.6 2020    MCH 24.8 2020    MCHC 30.4 2020    RDW 19.8 2020    LYMPHOPCT 14.1 2020    PROMYELOPCT 0.9 2020    MONOPCT 9.5 2020    EOSPCT 1.2 2020    BASOPCT 0.2 2020    MONOSABS 0.83 2020    LYMPHSABS 1.23 2020    EOSABS 0.11 08/27/2020    BASOSABS 0.02 08/27/2020     CMP:    Lab Results   Component Value Date     08/27/2020    K 3.6 08/27/2020    K 4.7 06/26/2020     08/27/2020    CO2 26 08/27/2020    BUN 25 08/27/2020    CREATININE 1.4 08/27/2020    GFRAA 42 08/27/2020    LABGLOM 35 08/27/2020    GLUCOSE 82 08/27/2020    PROT 5.8 08/27/2020    LABALBU 3.4 08/27/2020    CALCIUM 8.7 08/27/2020    BILITOT 0.6 08/27/2020    ALKPHOS 115 08/27/2020    AST 13 08/27/2020    ALT 6 08/27/2020       Current Inpatient Medications    Current Facility-Administered Medications: [START ON 8/28/2020] metOLazone (ZAROXOLYN) tablet 2.5 mg, 2.5 mg, Oral, Once per day on Mon Wed Fri  acetaminophen (TYLENOL) tablet 500 mg, 500 mg, Oral, Q6H PRN  allopurinol (ZYLOPRIM) tablet 100 mg, 100 mg, Oral, BID  carvedilol (COREG) tablet 3.125 mg, 3.125 mg, Oral, BID WC  pantoprazole (PROTONIX) tablet 40 mg, 40 mg, Oral, QAM AC  glipiZIDE (GLUCOTROL) tablet 5 mg, 5 mg, Oral, QAM AC  sacubitril-valsartan (ENTRESTO) 24-26 MG per tablet 1 tablet, 1 tablet, Oral, BID  traMADol (ULTRAM) tablet 50 mg, 50 mg, Oral, Q6H PRN  vitamin D (ERGOCALCIFEROL) capsule 50,000 Units, 50,000 Units, Oral, Weekly  bumetanide (BUMEX) injection 1 mg, 1 mg, Intravenous, BID  glucose (GLUTOSE) 40 % oral gel 15 g, 15 g, Oral, PRN  dextrose 50 % IV solution, 12.5 g, Intravenous, PRN  glucagon (rDNA) injection 1 mg, 1 mg, Intramuscular, PRN  dextrose 5 % solution, 100 mL/hr, Intravenous, PRN  predniSONE (DELTASONE) tablet 2.5 mg, 2.5 mg, Oral, Every Other Day  0.9 % sodium chloride bolus, 20 mL, Intravenous, Once  insulin lispro (HUMALOG) injection vial 0-12 Units, 0-12 Units, Subcutaneous, TID WC  insulin lispro (HUMALOG) injection vial 0-6 Units, 0-6 Units, Subcutaneous, Nightly    ASSESSMENT AND PLAN stable at near baseline  No active GI bleed  Out patient planning in progress

## 2020-08-27 NOTE — PROGRESS NOTES
PROGRESS NOTE       PATIENT PROBLEM LIST:  Active Problems:    Anemia, unspecified  Resolved Problems:    * No resolved hospital problems. *      SUBJECTIVE:  Deandra Amezcua states   She feels somewhat better and has no bowel complaints but still short of breath with exertion. REVIEW OF SYSTEMS:  General ROS: negative for - fatigue, malaise,  weight gain or weight loss  Psychological ROS: negative for - anxiety , depression  Ophthalmic ROS: negative for - decreased vision or visual distortion. ENT ROS: negative  Allergy and Immunology ROS: negative  Hematological and Lymphatic ROS: negative  Endocrine: no heat or cold intolerance and no polyphagia, polydipsia, or polyuria  Respiratory ROS: positive for - shortness of breath  Cardiovascular ROS: positive for - dyspnea on exertion, irregular heartbeat, loss of consciousness and shortness of breath. Gastrointestinal ROS: no abdominal pain, change in bowel habits, or black or bloody stools  Genito-Urinary ROS: no nocturia, dysuria, trouble voiding, frequency or hematuria  Musculoskeletal ROS: negative for- myalgias, arthralgias, or claudication  Neurological ROS: no TIA or stroke symptoms otherwise no significant change in symptoms or problems since yesterday as documented in previous progress notes.     SCHEDULED MEDICATIONS:   allopurinol  100 mg Oral BID    carvedilol  3.125 mg Oral BID WC    pantoprazole  40 mg Oral QAM AC    glipiZIDE  5 mg Oral QAM AC    sacubitril-valsartan  1 tablet Oral BID    vitamin D  50,000 Units Oral Weekly    bumetanide  1 mg Intravenous BID    predniSONE  2.5 mg Oral Every Other Day    sodium chloride  20 mL Intravenous Once    insulin lispro  0-12 Units Subcutaneous TID WC    insulin lispro  0-6 Units Subcutaneous Nightly       VITAL SIGNS:                                                                                                                          BP (!) 126/43   Pulse 68   Temp 96.4 °F (35.8 °C) (Temporal)   Resp 17   Ht 5' 3\" (1.6 m)   Wt 160 lb (72.6 kg)   SpO2 97%   BMI 28.34 kg/m²   Patient Vitals for the past 96 hrs (Last 3 readings):   Weight   08/24/20 0032 160 lb (72.6 kg)     OBJECTIVE:    HEENT: PERRL, EOM  Intact; sclera non-icteric, conjunctiva pink. Carotids are brisk in upstroke with normal contour. No carotid bruits. Normal jugular venous pulsation at 45°. No palpable cervical nor supraclavicular nodes. Thyroid not palpable. Trachea midline. Chest: Even excursion  Lungs: CTA B, no expiratory wheezes or rhonchi, no decreased tactile fremitus without inspiratory rales. Heart: Regular  rhythm; S1 > S2, no gallop or murmur. No clicks, rub, palpable thrills   or heaves. PMI nondisplaced, 5th intercostal space MCL. Abdomen: Soft, nontender, nondistended,  moderately protuberant, no masses or organomegaly. Bowel sounds active. Extremities: Without clubbing, cyanosis or edema. Pulses present 3+ upper extermities bilaterally; present 2+ DP and present 1+ PT bilaterally. Data:   Scheduled Meds: Reviewed  Continuous Infusions:    dextrose         Intake/Output Summary (Last 24 hours) at 8/27/2020 0305  Last data filed at 8/26/2020 1542  Gross per 24 hour   Intake 240 ml   Output --   Net 240 ml     CBC:   Recent Labs     08/24/20  0935 08/24/20  1440 08/24/20  2113 08/25/20  0510 08/25/20  1711 08/26/20  0713   WBC 11.2  --   --  8.5  --  8.9   HGB 7.4*  --  8.1* 8.3* 9.2* 9.6*   HCT 24.7* 23.8* 26.3* 26.8* 29.7* 31.6*     --   --  278  --  268     BMP:  Recent Labs     08/25/20  0510 08/26/20  0713    142   K 3.3* 3.7    103   CO2 25 24   BUN 24* 23   CREATININE 1.3* 1.4*   LABGLOM 38 35     ABGs: No results found for: PH, PO2, PCO2  INR: No results for input(s): INR in the last 72 hours.   PRO-BNP:   Lab Results   Component Value Date    PROBNP 19,505 (H) 08/24/2020    PROBNP 4,856 (H) 09/14/2016      TSH:   Lab Results   Component Value Date    TSH 4.09 08/13/2019 Cardiac Injury Profile: No results for input(s): CKTOTAL, CKMB, TROPONINI in the last 72 hours. Lipid Profile:   Lab Results   Component Value Date    TRIG 219 2018    HDL 43 2018    LDLCALC 118 2018    CHOL 205 2018      Hemoglobin A1C: No components found for: HGBA1C     RAD:   Xr Chest Portable    Result Date: 2020  Patient MRN:  [id number] : 1927 Age: 80 years Gender: Female Order Date:  2020 12:50 AM TECHNIQUE/NUMBER OF IMAGES/COMPARISON/CLINICAL HISTORY: Chest AP 1 image one view Comparison Suly 3. Difficult breathing. FINDINGS: Heart is enlarged. Increased density seen in both lower lung base which can represent infiltrates. The cannot excluded a small bilateral pleural effusions. The patient is rotated to the left. Cardiomegaly. The bibasilar infiltrates. Possible small bilateral perfusions. EKG: See Report  Echo: See Report      IMPRESSIONS:  Active Problems:    Anemia, unspecified  Resolved Problems:    * No resolved hospital problems. *      RECOMMENDATIONS:    At this time we will add metolazone 3 times weekly to her daily dose of bumetanide. I have also contacted I a cardiac investigator involved in placing stented mitral valves and he will see if she is an exclusion to study entry. Fortunately, today her SpO2 is 99% with walking. Continue diuretics with additional metolazone. I have spent more than When heminutes face to face with Eboni Catalan and reviewing notes and laboratory data, with greater than 50% of this time instructing and counseling the patient  gotface to face regarding my findings and recommendations and I have answered all questions as posed to me by Ms. Ирина Martinez as well as her daughter. Nadeem Longoria, DO FACP,FACC,FSCAI      NOTE:  This report was transcribed using voice recognition software.   Every effort was made to ensure accuracy; however, inadvertent computerized transcription errors may be present

## 2020-08-28 NOTE — TELEPHONE ENCOUNTER
Tomás Garcia RN  P s Clinical Pharmacy Clinical Staff               RUSSELL EDOUARD DC 8/27/2020.     Please call patient's daughter Barbara Garcia. Zabrina voices concerns regarding patient's medications at discharge. [id number] will need to be entered. Thank you!

## 2020-08-28 NOTE — TELEPHONE ENCOUNTER
CLINICAL PHARMACY NOTE  Post-Discharge Transitions of Care (JEREMIAH)    Non-face-to-face services provided:  Assessment and support for treatment adherence and medication management-- med rec    Subjective/Objective:  Nadiya Fraser is a 80 y.o. female. Patient was discharged from [facility] on 8/27/20. Patient outreach to review discharge medications and provide medication review and management. Spoke with patient and daughter   - Hgb improved to 9.7, Iron studies consistent with iron deficiency, S/p Epo and Ferrlecit- set up to get more?- is waiting on this  follow with Dr. Alfie Uribe- waiting to schedule this bc wants to hear to from [address] [place] [address] first about valve surgery  Anemia aggravating CHF- still has SOB, is looking into having heart valve surgery  Sugars- 123    Allergies   Allergen Reactions    Iodine     Iv Dye [Iodides]      Discharge Medications (as per current medication list/AVS):  There are NEW medications for you.  START taking them after you leave the hospital:  metOLazone (ZAROXOLYN) 2.5 MG tablet Take 1 tablet by mouth three times a week     You told us you were taking these medications at home, but the amount or how often you take this medication has CHANGED:  na  These are medications you told us you were taking at home, CONTINUE taking them after you leave the hospital:  Medication Sig    predniSONE (DELTASONE) 5 MG tablet Take 2.5 mg by mouth every other day     traMADol (ULTRAM) 50 MG tablet Take 50 mg by mouth every 6 hours as needed for Pain.  -no longer taking    glipiZIDE (GLUCOTROL XL) 5 MG extended release tablet Take 5 mg by mouth daily    colchicine (COLCRYS) 0.6 MG tablet Take 0.6 mg by mouth daily as needed for Pain  -prn gout attack    vitamin D (ERGOCALCIFEROL) 1.25 MG (83048 UT) CAPS capsule Take 50,000 Units by mouth once a week Given Wednesday    clopidogrel (PLAVIX) 75 MG tablet Take 75 mg by mouth daily    Alum Hydroxide-Mag Carbonate (GAVISCON EXTRA RELIEF FORMULA) 508-475 MG/10ML SUSP Take 10 mLs by mouth daily as needed    carvedilol (COREG) 3.125 MG tablet Take 3.125 mg by mouth 2 times daily (with meals)     acetaminophen (TYLENOL) 500 MG tablet Take 1,000 mg by mouth every 6 hours as needed for Pain   -no longer taking    bumetanide (BUMEX) 1 MG tablet Take 1 tablet by mouth daily    sacubitril-valsartan (ENTRESTO) 24-26 MG per tablet Take 1 tablet by mouth 2 times daily     allopurinol (ZYLOPRIM) 100 MG tablet Take 100 mg by mouth 2 times daily     esomeprazole (NEXIUM) 20 MG delayed release capsule Take 20 mg by mouth daily as needed (indigestion)  -no longer taking      These are the medications you have told us you were taking at home, STOP taking them after you leave the hospital:  aspirin 81 MG EC tablet  metoclopramide 5 MG tablet (Reglan)    TCM Call Made?: Yes       Additional Medications:  na      Estimated Creatinine Clearance: 24 mL/min (A) (based on SCr of 1.4 mg/dL (H)). Assessment/Plan:    - Pt is taking medications as directed by discharging physician. Number of discrepancies: 1. Instructions per discharge list provided except per below documentation. Identified medication discrepancies/issues:   · Category 4 (1):  1.  Update med list    - Identified Potential Medication Interactions: No clinically significant interactions identified via [facility] Interaction Analysis as category D or higher.    - Renal Dosing: No renal adjustments necessary.    - Follow up appointment date (7 days for more severe illness, 14 days for others):      · Patient encouraged to schedule follow up with PCP    Thank you,    Shamir Roe, PharmD, [place] Pharmacist  Direct: [phone], Ext [phone]  ==================================  CLINICAL PHARMACY NOTE   POST-DISCHARGE [facility] [address] Only    TCM Call Made?: Yes  [facility] Select Patient?: Yes  Total # of Interventions Recommended: 1 - Updated Order #: 1  Total # Interventions Accepted: 1  Intervention Severity:   - Level 1 Intervention Present?: No   - Level 2 #: 0   - Level 3 #: 0  Outreach Status: Review Complete  Care Coordinator Outreach to Patient?: Yes  Provider Contacted?: No  Time Spent (min): 30    Additional Documentation:

## 2020-08-28 NOTE — CARE COORDINATION
this time due to patient's daughter does not have AVS available. Confirmed Zaroxolyn as prescribed at discharge. CTN discussed would like to follow patient over the next few weeks, and Bismark Bruce is receptive to receiving follow up phone calls. Emotional support provided.        Follow Up  Future Appointments   Date Time Provider Marquis Pickett   9/30/2020  9:00 AM Adrienne Bermudez MD [facility]       Jane Redd RN

## 2020-09-03 NOTE — CARE COORDINATION
[facility] Transitions Follow Up Call    9/3/2020    Patient: Nadiya Fraser  Patient : 1927   MRN: [id number]  Reason for Admission:  Anemia, Congestive Heart Failure  Discharge Date: 20 RARS: Readmission Risk Score: 23         Spoke with:  Bret Hart, patient's daughter. Altagracia Olmstead is providing patient update:    Care Transitions Subsequent and Final Call    Subsequent and Final Calls  Do you have any ongoing symptoms?:  Yes  Patient-reported symptoms:  Fatigue  -swelling in left foot \"has gone down\"   -cough and shortness of breath are improving  Do you have any questions related to your medications?:  No  Do you currently have any active services?:  No  Do you have any needs or concerns that I can assist you with?:  No  Identified Barriers:  impairment  Care Transitions Interventions; none at this time. Patient has a supportive family network along with adequate resources. CTN discussed with patient's daughter regarding nurse follow up, and patient's daughter Klaudia Montano states no further outreach is needed at this time. Discussed Altagracia Olmstead had follow up with [facility] Pharmacist.   Patient has an appointment today with Dr. Camila Scott. Plan is for patient to follow up at [facility]. Emotional support provided. Encouraged Zabrina to call with any future needs or concerns; CTN contact information provided.      Follow Up  Future Appointments   Date Time Provider Marquis Pickett   2020  9:00 AM Mariana Yeung MD [facility]       Nickie Morales RN

## 2020-11-24 PROBLEM — N17.9 ACUTE KIDNEY INJURY (HCC): Status: ACTIVE | Noted: 2020-01-01

## 2020-11-24 PROBLEM — D64.9 ANEMIA, UNSPECIFIED: Status: RESOLVED | Noted: 2020-01-01 | Resolved: 2020-01-01

## 2020-11-24 PROBLEM — S72.002A CLOSED LEFT HIP FRACTURE, INITIAL ENCOUNTER (HCC): Status: RESOLVED | Noted: 2020-01-01 | Resolved: 2020-01-01

## 2020-11-24 NOTE — ED PROVIDER NOTES
HPI:  11/24/20,   Time: 3:55 PM BERNADETTE Merida is a 80 y.o. female presenting to the ED for fall, beginning 6 hrs ago. The complaint has been intermittent, moderate in severity, and worsened by nothing. Fall x 2 today, pt states just slipped. First time took a while to get up, then fell again. C/o left shoulder and hipi pain. Bib ems. No cp/sob/n/v/d. Lives at home with daughter    Review of Systems:   Pertinent positives and negatives are stated within HPI, all other systems reviewed and are negative.          --------------------------------------------- PAST HISTORY ---------------------------------------------  Past Medical History:  has a past medical history of Arthritis, CHF (congestive heart failure) ([facility] [address] CKD (chronic kidney disease), Diabetic eye exam ([facility] [address] Hyperlipidemia, Hypertension, Iron deficiency anemia, Type II or unspecified type diabetes mellitus without mention of complication, not stated as uncontrolled, Ulcer of foot ([facility] [address] and Valvular heart disease. Past Surgical History:  has a past surgical history that includes Appendectomy; Cholecystectomy; Carpal tunnel release; Cataract removal; Rotator cuff repair; Tonsillectomy; vascular surgery (4/18/2013); Upper gastrointestinal endoscopy (05/28/2015); joint replacement; Endoscopy, colon, diagnostic; us kidney duplex bilat (09/17/2016); Adenoidectomy; Total knee arthroplasty; Cataract removal; Upper gastrointestinal endoscopy (11/07/2017); doppler echocardiography (06/07/2018); and transesophageal echocardiogram (06/19/2019). Social History:  reports that she has never smoked. She has never used smokeless tobacco. She reports current alcohol use. She reports that she does not use drugs. Family History: family history is not on file. The patients home medications have been reviewed.     Allergies: Iodine and Iv dye [iodides]        ---------------------------------------------------PHYSICAL EXAM--------------------------------------    Constitutional/General: Alert and oriented x3, well appearing, non toxic in NAD  Head: Normocephalic and atraumatic  Eyes: PERRL, EOMI, conjunctive normal, sclera non icteric  Mouth: Oropharynx clear, handling secretions, no trismus, no asymmetry of the posterior oropharynx or uvular edema  Neck: Supple, full ROM, non tender to palpation in the midline, no stridor, no crepitus, no meningeal signs  Respiratory: Lungs clear to auscultation bilaterally, no wheezes, rales, or rhonchi. Not in respiratory distress  Cardiovascular:  Regular rate. Regular rhythm. No murmurs, gallops, or rubs. 2+ distal pulses  Chest: No chest wall tenderness  GI:  Abdomen Soft, Non tender, Non distended. +BS. No organomegaly, no palpable masses,  No rebound, guarding, or rigidity. Musculoskeletal: Moves all extremities x 4. Warm and well perfused, no clubbing, cyanosis, or edema. Capillary refill <3 seconds, diffuse left shoulder pain, no deformity, left hip pain, no shortening, no er/ir. nvi in all ext  Integument: skin warm and dry. No rashes. Lymphatic: no lymphadenopathy noted  Neurologic: GCS 15, no focal deficits, symmetric strength 5/5 in the upper and lower extremities bilaterally  Psychiatric: Normal Affect    -------------------------------------------------- RESULTS -------------------------------------------------  I have personally reviewed all laboratory and imaging results for this patient. Results are listed below.      LABS:  Results for orders placed or performed during the hospital encounter of 11/24/20   CBC Auto Differential   Result Value Ref Range    WBC 12.5 (H) 4.5 - 11.5 E9/L    RBC 3.97 3.50 - 5.50 E12/L    Hemoglobin 10.0 (L) 11.5 - 15.5 g/dL    Hematocrit 30.7 (L) 34.0 - 48.0 %    MCV 77.3 (L) 80.0 - 99.9 fL    MCH 25.2 (L) 26.0 - 35.0 pg    MCHC 32.6 32.0 - 34.5 %    RDW 20.5 (H) 11.5 - 15.0 fL    Platelets 049 562 - 401 E9/L    MPV 10.1 7.0 - 12.0 fL Neutrophils % 83.1 (H) 43.0 - 80.0 %    Immature Granulocytes % 0.6 0.0 - 5.0 %    Lymphocytes % 10.1 (L) 20.0 - 42.0 %    Monocytes % 5.1 2.0 - 12.0 %    Eosinophils % 0.9 0.0 - 6.0 %    Basophils % 0.2 0.0 - 2.0 %    Neutrophils Absolute 10.40 (H) 1.80 - 7.30 E9/L    Immature Granulocytes # 0.08 E9/L    Lymphocytes Absolute 1.26 (L) 1.50 - 4.00 E9/L    Monocytes Absolute 0.64 0.10 - 0.95 E9/L    Eosinophils Absolute 0.11 0.05 - 0.50 E9/L    Basophils Absolute 0.02 0.00 - 0.20 E9/L    Anisocytosis 2+     Poikilocytes 1+     Schistocytes 1+     Ovalocytes 1+     Sickle Cells 1+    Comprehensive Metabolic Panel w/ Reflex to MG   Result Value Ref Range    Sodium 130 (L) 132 - 146 mmol/L    Potassium reflex Magnesium 3.9 3.5 - 5.0 mmol/L    Chloride 91 (L) 98 - 107 mmol/L    CO2 25 22 - 29 mmol/L    Anion Gap 14 7 - 16 mmol/L    Glucose 197 (H) 74 - 99 mg/dL    BUN 96 (H) 8 - 23 mg/dL    CREATININE 2.5 (H) 0.5 - 1.0 mg/dL    GFR Non-African American 18 >=60 mL/min/1.73    GFR African American 22     Calcium 9.4 8.6 - 10.2 mg/dL    Total Protein 7.0 6.4 - 8.3 g/dL    Alb 3.7 3.5 - 5.2 g/dL    Total Bilirubin 0.4 0.0 - 1.2 mg/dL    Alkaline Phosphatase 88 35 - 104 U/L    ALT 5 0 - 32 U/L    AST 14 0 - 31 U/L   Troponin   Result Value Ref Range    Troponin 0.03 0.00 - 0.03 ng/mL   Protime-INR   Result Value Ref Range    Protime 11.4 9.3 - 12.4 sec    INR 1.0    APTT   Result Value Ref Range    aPTT 27.9 24.5 - 35.1 sec   Urinalysis, reflex to microscopic   Result Value Ref Range    Color, UA Yellow Straw/Yellow    Clarity, UA Clear Clear    Glucose, Ur Negative Negative mg/dL    Bilirubin Urine Negative Negative    Ketones, Urine Negative Negative mg/dL    Specific Gravity, UA 1.010 1.005 - 1.030    Blood, Urine Negative Negative    pH, UA 5.0 5.0 - 9.0    Protein, UA Negative Negative mg/dL    Urobilinogen, Urine 0.2 <2.0 E.U./dL    Nitrite, Urine Negative Negative    Leukocyte Esterase, Urine Negative Negative   EKG 12 Medical Decision Making:    W/u noted, discussed with son, transfer to [place] for further care      This patient's ED course included: a personal history and physicial examination    This patient has remained hemodynamically stable during their ED course. Re-Evaluations:             Re-evaluation. Patients symptoms show no change    Re-examination  11/24/20   3:55 PM EST          Vital Signs:   Vitals:    11/24/20 1535 11/24/20 1537   BP: (!) 100/58    Pulse: 90    Resp: 18    Temp: 98 °F (36.7 °C)    TempSrc: Infrared    SpO2: 98%    Weight:  160 lb (72.6 kg)   Height:  5' 3\" (1.6 m)     Card/Pulm:  Rhythm: normal rate. Heart Sounds: no murmurs, gallops, or rubs. clear to auscultation, no wheezes or rales and unlabored breathing. Capillary Refill: normal.  Radial Pulse:  equal.  Skin:  Warm. Consultations:                 Critical Care:         Counseling: The emergency provider has spoken with the patient and discussed todays results, in addition to providing specific details for the plan of care and counseling regarding the diagnosis and prognosis. Questions are answered at this time and they are agreeable with the plan.       --------------------------------- IMPRESSION AND DISPOSITION ---------------------------------    IMPRESSION  1. Acute renal failure, unspecified acute renal failure type ([facility] [address]    2. Hyponatremia        DISPOSITION  Disposition: Discharge to home  Patient condition is stable    NOTE: This report was transcribed using voice recognition software.  Every effort was made to ensure accuracy; however, inadvertent computerized transcription errors may be present        Walter Everett MD  11/24/20 2040

## 2020-11-25 NOTE — PROGRESS NOTES
Called patients daughter Regina Smallwood. Was able to get her medication list and reconcile. Also, asked for her to give paperwork regarding her code status and she does not have it. Left a note for Dr. Kris Martinez.     Electronically signed by Cherry High RN on 11/25/2020 at 6:50 AM

## 2020-11-25 NOTE — PROGRESS NOTES
I discussed the case with Dr. Miki Shah who is the patient's son over the phone. He is aware that she cannot go to acute rehab. He would like for the patient to go to [facility] and be followed by me when she is stable.     Electronically signed by Emma Fenton DO on 11/25/2020 at 3:16 PM

## 2020-11-25 NOTE — PATIENT CARE CONFERENCE
[facility] Quality Flow/Interdisciplinary Rounds Progress Note        Quality Flow Rounds held on November 25, 2020    Disciplines Attending:  Bedside Nurse, ,  and Nursing Unit Leadership    Chad Law was admitted on 11/25/2020  1:12 AM    Anticipated Discharge Date:  Expected Discharge Date: 11/28/20    Disposition:    Davon Score:  Davon Scale Score: 19    Readmission Risk              Risk of Unplanned Readmission:        30           Discussed patient goal for the day, patient clinical progression, and barriers to discharge.   The following Goal(s) of the Day/Commitment(s) have been identified:  Labs - Report Results      Bushra Pages  November 25, 2020

## 2020-11-25 NOTE — PROGRESS NOTES
Patient is unsure of her home medications. Patient states daughter will be able to verify medications and code status in the morning. She stated \" do not call her until the morning\". I will call before I leave shift.

## 2020-11-25 NOTE — PROGRESS NOTES
I called the patient's son again at [phone]. There was no answer again. I left a voicemail again asking for a call back to my personal cell phone number.     Electronically signed by Cyndi Plasencia DO on 11/25/2020 at 2:52 PM

## 2020-11-25 NOTE — H&P
not stated as uncontrolled     Ulcer of foot ([facility] [address] 6/18/2013    Valvular heart disease        Past Surgical History:   Procedure Laterality Date    ADENOIDECTOMY      APPENDECTOMY      CARPAL TUNNEL RELEASE      CATARACT REMOVAL      CATARACT REMOVAL      CHOLECYSTECTOMY      DOPPLER ECHOCARDIOGRAPHY  06/07/2018    Dr Christian Mckeon mitral regurg-mild tricuspid regurg-trace pulmonic regurg-trace to mild aortic regurg    ENDOSCOPY, COLON, DIAGNOSTIC      JOINT REPLACEMENT      BILAT KNEE    ROTATOR CUFF REPAIR      TONSILLECTOMY      TOTAL KNEE ARTHROPLASTY      TRANSESOPHAGEAL ECHOCARDIOGRAM  06/19/2019    DR Maldonado    UPPER GASTROINTESTINAL ENDOSCOPY  05/28/2015    UPPER GASTROINTESTINAL ENDOSCOPY  11/07/2017    DR JEFF SULLIVAN    US KIDNEY DUPLEX BILAT  09/17/2016    [facility]-normal    VASCULAR SURGERY  4/18/2013    Right leg angiogram by DR Francis       No family history on file. No pertinent family medical history with regard to current issues. Social History  Patient lives at home with her daughter. Employment: Retired  Illicit drug use- denies  TOBACCO:   reports that she has never smoked. She has never used smokeless tobacco.  ETOH:   reports current alcohol use. Home Medications  Prior to Admission medications    Medication Sig Start Date End Date Taking?  Authorizing Provider   Insulin Degludec (TRESIBA SC) Inject 34 Units into the skin 2 times daily Once in the AM before breakfast and once at night   Yes Historical Provider, MD   metoclopramide (REGLAN) 5 MG tablet Take 5 mg by mouth 3 times daily (with meals)   Yes Historical Provider, MD   clopidogrel (PLAVIX) 75 MG tablet TAKE ONE TABLET BY MOUTH DAILY 11/23/20  Yes Lieutenant Maxi MD   hydrOXYzine (ATARAX) 25 MG tablet Take 1 tablet by mouth every 8 hours as needed for Itching 11/23/20  Yes Lieutenant Maxi MD   QUEtiapine (SEROQUEL) 25 MG tablet Take 0.5 tablets by mouth 2 times daily  Patient taking of breath, wheezing, coughing  Gastrointestinal: abdominal pain, nausea, vomiting, diarrhea, constipation, melena, hematochezia, hematemesis  Genitourinary: dysuria, hematuria, increased frequency  Musculoskeletal: lower extremity edema, myalgias, arthralgias, back pain  Integumentary: rashes, itching   Neurological: headache, lightheadedness, dizziness, confusion, syncope, numbness, tingling, focal weakness  Psychiatric: depression, suicidal ideation, anxiety  Endocrine: unintentional weight change  Hematologic/Lymphatic: lymphadenopathy, easy bruising, easy bleeding   Allergic/Immunologic: recurrent infections      Objective  VITALS:  BP (!) 156/70   Pulse 83   Temp 98.2 °F (36.8 °C) (Oral)   Resp 16   Ht 5' 3\" (1.6 m)   Wt 164 lb 6.4 oz (74.6 kg)   SpO2 96%   BMI 29.12 kg/m²     Physical Exam:  General: awake, alert, oriented to person, place, time, and purpose, appears stated age, cooperative, no acute distress, pleasant, appropriate mood  Eyes: conjunctivae/corneas clear, sclera non icteric, EOMI  Ears: no obvious scars, no lesions, no masses, hearing intact  Mouth: mucous membranes moist, no obvious oral sores  Head: normocephalic, atraumatic  Neck: no JVD, no adenopathy, no thyromegaly, neck is supple, trachea is midline  Back: ROM normal, no CVA tenderness.   Chest: no pain on palpation  Lungs: clear to auscultation bilaterally, without rhonchi, crackle, wheezing, or rale, no retractions or use of accessory muscles  Heart: regular rate and regular rhythm, no murmur, normal S1, S2  Abdomen: soft, non-tender; bowel sounds normal; no masses, no organomegaly  : Deferred   Extremities: 1+ lower extremity edema, hips tender to palpation, no cyanosis, no clubbing, 2+ pedal pulses palpated  Skin: normal color, normal texture, normal turgor, no rashes, no lesions  Neurologic:5/5 muscle strength throughout, normal muscle tone throughout, face symmetric, hearing intact, tongue midline, speech appropriate without slurring, sensation to fine touch intact in upper and lower extremities    Labs-   Lab Results   Component Value Date    WBC 12.1 (H) 11/25/2020    HGB 10.4 (L) 11/25/2020    HCT 32.5 (L) 11/25/2020     11/25/2020     11/25/2020    K 3.7 11/25/2020    CL 93 (L) 11/25/2020    CREATININE 2.1 (H) 11/25/2020    BUN 89 (H) 11/25/2020    CO2 24 11/25/2020    GLUCOSE 109 (H) 11/25/2020    ALT 7 11/25/2020    AST 16 11/25/2020    INR 1.0 11/24/2020     Lab Results   Component Value Date    TROPONINI 0.03 11/24/2020       Recent Radiological Studies:  No orders to display       Assessment  Active Hospital Problems    Diagnosis    [facility] [N17.9]     Priority: High    Anemia [D64.9]     Priority: Medium    Chronic systolic congestive heart failure (HCC) [I50.22]     Priority: Medium    CKD (chronic kidney disease) stage 3, GFR 30-59 ml/min ([facility]) [N18.30]     Priority: Medium    Vitamin D deficiency [E55.9]    Abnormal nuclear stress test [R94.39]    Cardiomyopathy ([facility] [I42.9]    VHD (valvular heart disease) [I38]    Severe mitral regurgitation [I34.0]    Nonrheumatic aortic valve stenosis [I35.0]    Pulmonary HTN ([facility]) [I27.20]    Essential hypertension [I10]    Mixed hyperlipidemia [E78.2]    Type 2 diabetes mellitus with complication, without long-term current use of insulin ([facility] [E11.8]    Arthritis [M19.90]    H/O: gout [Z87.39]    Hiatal hernia [K44.9]    Gastroesophageal reflux disease [K21.9]    Chronic blood loss anemia [D50.0]    PVD (peripheral vascular disease) with claudication ([facility] [address] [I73.9]    Atherosclerosis of native arteries of the extremities with ulceration ([facility] [I70.25]       Patient Active Problem List    Diagnosis Date Noted    Acute kidney injury ([facility] 11/24/2020     Priority: High    Anemia 05/17/2019     Priority: Medium    Chronic systolic congestive heart failure ([facility] 10/31/2016     Priority: Medium    CKD (chronic kidney disease) stage 3, GFR 30-59 ml/min ([facility]) 10/31/2016     Priority: Medium    Vitamin D deficiency 12/07/2016    Abnormal nuclear stress test 10/31/2016    Cardiomyopathy ([facility] 10/31/2016    VHD (valvular heart disease) 10/31/2016    Severe mitral regurgitation 10/31/2016    Nonrheumatic aortic valve stenosis 10/31/2016    [facility]    Essential hypertension 10/31/2016    Mixed hyperlipidemia 10/31/2016    Type 2 diabetes mellitus with complication, without long-term current use of insulin ([facility] 10/31/2016    Arthritis 10/31/2016    H/O: gout 10/31/2016    Hiatal hernia 10/31/2016    Gastroesophageal reflux disease 10/31/2016    Gastric polyps 10/31/2016    Pyloric stenosis 10/31/2016    Chronic blood loss anemia 10/31/2016    PVD (peripheral vascular disease) with claudication ([facility] 03/21/2013    Atherosclerosis of native arteries of the extremities with ulceration ([facility] 03/21/2013       Plan  · LUCI on CKD3: Follow BMP. IVF (cautious due to h/o CHF). Hold Lasix, Zaroxolyn, Entresto. · Weakness: PT/OT. PMNR consultation per family request.  Anemia, microcytic: Noted Fe/Ferritin/TIBC/vitamin B12/folate. Follow H&H. Check stool for occult blood. · DM, controlled: Continue home DM meds-- adjust as needed. Add SSI. HbA1c.  · Continue home medications other than Lasix, Zaroxolyn, Entresto. · Follow labs  · DVT prophylaxis. · Please see orders for further management and care. ·  for discharge planning  · Discharge plan: TBD pending clinical improvement     The patient was seen and evaluated by myself and Vega Logan MD PGY-1  11/25/2020 9:02 AM    I can be reached through [employer]. NOTE:  This report was transcribed using voice recognition software. Every effort was made to ensure accuracy; however, inadvertent computerized transcription errors may be present.     Addendum: I have personally participated in a face-to-face history and physical exam on

## 2020-11-25 NOTE — PROGRESS NOTES
Occupational Therapy  Date:11/25/2020  Patient Name: Kirit Rodrigez  Room: [facility]/2814-     Occupational Therapy (OT) order received, patient's medical record reviewed, and OT evaluation attempted this afternoon; patient declined to participate in [address] [place] [address] activities, despite verbal encouragement, due to wanting to rest. OT evaluation to be re-attempted at later date, as able/appropriate. Yenni Ponce OTR/L  License Number: [id number]

## 2020-11-25 NOTE — PROGRESS NOTES
Physical Therapy    Facility/Department: [facility] MED SURG  Initial Assessment    NAME: Carl Merida  : 1927  MRN: [id number]    Date of Service: 2020      Attending Provider:  Sabrina Becker DO    Evaluating PT:  Luis Castro, P.T. Room #:  1901/3476-N  Diagnosis:  LUCI, fell at home  Imaging: X-rays for R hip and shoulder were negative for fx. Precautions:  Falls, bed/chair alarm     SUBJECTIVE:    Pt lives with daughter in a 2 story home with 3-4 stairs and 1 rail to enter. Her bed and bath are on the first floor. Pt ambulated with ww PTA. OBJECTIVE:   Initial Evaluation  Date: 20 Treatment Short Term/ Long Term   Goals   Was pt agreeable to Eval/treatment? yes     Does pt have pain? No c/o pain     Bed Mobility  Rolling: MIN A  Supine to sit: MOD A  Sit to supine: MOD A  Scooting: MOD A  SBA   Transfers Sit to stand: MIN A  Stand to sit: MIN A  Stand pivot: MIN A with ww  SBA   Ambulation   30 feet with ww MIN A  100 feet with ww SBA   Stair negotiation: ascended and descended NA  4 steps with 1 rail SBA   AM-PAC 6 Clicks 78/89       BLE ROM is WFL. BLE strength is grossly 3-/5 to 4-/5. Sensation:  Pt denies numbness and tingling to extremities  Edema:  None noted  Balance: sitting is SBA and standing with ww is MIN A  Endurance: fair    Patient education  Pt educated on hand placement during transfers. Patient response to education:   Pt verbalized understanding Pt demonstrated skill Pt requires further education in this area   yes yes yes     ASSESSMENT:    Comments:  Pt was found in bed and agreeable to PT. She has decreased strength and endurance limiting functional mobility at this time. Pt was left supine in bed with call light left by patient. Chair/bed alarm: bed alarm was activated. Pt's/ family goals   1. To go home     Patient and or family understand(s) diagnosis, prognosis, and plan of care.     PLAN OF CARE:    Current Treatment Recommendations      [x] Strengthening     [] ROM   [x] Balance Training   [x] Endurance Training   [x] Transfer Training   [x] Gait Training   [x] Stair Training   [] Positioning   [] Safety and Education Training   [x] Patient/Caregiver Education   [] HEP  [] Other     PT care will be provided in accordance with the objectives noted above. Exercises and functional mobility practice will be used as well as appropriate assistive devices or modalities to obtain goals. Patient and family education will also be administered as needed. Frequency of treatments: 2-5x/week x 1-2 weeks. Time in  12:50  Time out  13:05    Evaluation Time includes thorough review of current medical information, gathering information on past medical history/social history and prior level of function, completion of standardized testing/informal observation of tasks, assessment of data and education on plan of care and goals. CPT codes:  [x] Low Complexity PT evaluation 69834  [] Moderate Complexity PT evaluation 23153  [] High Complexity PT evaluation 92304  [] PT Re-evaluation 28708  [] Gait training 07023 ** minutes  [] Manual therapy 54341 ** minutes  [] Therapeutic activities 52221 ** minutes  [] Therapeutic exercises 04955 ** minutes  [] Neuromuscular reeducation 22491 ** minutes     Javier Bolivar., P.T.   License Number: [id number]

## 2020-11-25 NOTE — ED NOTES
Called access line, spoke with Chester Sentrose marie, awaiting call back.   Electronically signed by Virgie Velazquez on 11/24/2020 at 7:56 PM       Virgie Velazquez  11/24/20 1956

## 2020-11-25 NOTE — CARE COORDINATION
Social Work discharge [facility] [place] spoke to pt/dtr Stevie Floyd and son Dr Corinne Jarvis about discharge planning. Pt lives with Inis Koyanagi with 3-4 step entry and bed/bath on 1st floor. Stevie Floyd said pt uses a ww. Stevie Floyd advised pt was in ARU at [facility] (1-) in June 2020, and that if pt needs rehab again, they want ARU again if she qualifies. If SNF needed, dtr and son chose [facility] or [place] after Sw read them [facility]. Sw called referral to [facility], which there are no beds unitl next week. Per son Dr Corinne Jarvis' request, Sw notified Dr Madiha Osborne at 9:57am that Dr Corinne Jarvis asked he call him today. Mandeep also mentioned to Dr Madiha Osborne that pt will need PM&R consult per family choice once medically appropriate. Dr Madiha Osborne responded to message that he will call son Dr Corinne Jarvis. Await PT OT [facility] for further planning. Electronically signed by Beatriz Ryan on 11/25/2020 at 10:04 AM     Addendum-    Family advised pt will require much encouragement with PT OT.   Electronically signed by Beatriz Ryan on 11/25/2020 at 10:05 AM

## 2020-11-25 NOTE — PROGRESS NOTES
Per Dr Kenisha Marquez, patient has no acute rehab diagnosis that would warrant admission to ARU. Unable to accept at this time. Will not follow.

## 2020-11-26 NOTE — PROGRESS NOTES
Haile, DO        dextrose 5 % solution  100 mL/hr Intravenous PRN Ignacio Be DO        allopurinol (ZYLOPRIM) tablet 100 mg  100 mg Oral BID Ignacio Be DO   100 mg at 11/25/20 2056    carvedilol (COREG) tablet 3.125 mg  3.125 mg Oral BID WC Ignacio Be DO        clopidogrel (PLAVIX) tablet 75 mg  75 mg Oral Daily Ignacio Be DO   75 mg at 11/25/20 0816    metoclopramide (REGLAN) tablet 5 mg  5 mg Oral TID AC Ignacio Be DO   5 mg at 11/25/20 1707    predniSONE (DELTASONE) tablet 2.5 mg  2.5 mg Oral Daily with breakfast Ignacio Be DO   2.5 mg at 11/25/20 0815    QUEtiapine (SEROQUEL) tablet 75 mg  75 mg Oral Nightly Ignacio Be DO   75 mg at 11/25/20 2056    hydrOXYzine (VISTARIL) capsule 25 mg  25 mg Oral Q8H PRN Ignacio Be DO        insulin glargine (LANTUS) injection vial 34 Units  34 Units Subcutaneous BID Ignacio Be DO   34 Units at 11/25/20 2055    white petrolatum ointment   Topical PRN Ignacio Be DO           PRN Medications  sodium chloride flush, acetaminophen **OR** acetaminophen, senna, glucose, dextrose, glucagon (rDNA), dextrose, hydrOXYzine, white petrolatum    Objective  Most Recent Recorded Vitals  /72   Pulse 86   Temp 98.2 °F (36.8 °C) (Oral)   Resp 16   Ht 5' 3\" (1.6 m)   Wt 173 lb (78.5 kg)   SpO2 98%   BMI 30.65 kg/m²   I/O last 3 completed shifts:   In: 5 [P.O.:420]  Out: 550 [Urine:550]  I/O this shift:  In: -   Out: 400 [Urine:400]    Physical Exam:   General: AAO to person/place/time/purpose, NAD, no labored breathing, appears tired  Eyes: conjunctivae/corneas clear, sclera non icteric  Skin: color/texture/turgor normal, no rashes or lesions  Lungs: CTAB, no retractions/use of accessory muscles, no vocal fremitus, no rhonchi, no crackle, no rales  Heart: regular rate, regular rhythm, no murmur  Abdomen: soft, NT; bowel sounds normal; no masses,  no organomegaly  Extremities: atraumatic, no cyanosis, no edema  Neurologic: cranial nerves 2-12 grossly intact, no slurred speech. Most Recent Labs  Lab Results   Component Value Date    WBC 10.3 11/26/2020    HGB 10.2 (L) 11/26/2020    HCT 33.3 (L) 11/26/2020     11/26/2020     11/26/2020    K 3.5 11/26/2020    CL 98 11/26/2020    CREATININE 1.7 (H) 11/26/2020    BUN 70 (H) 11/26/2020    CO2 23 11/26/2020    GLUCOSE 86 11/26/2020    ALT 8 11/26/2020    AST 19 11/26/2020    INR 1.0 11/24/2020    TSH 4.09 08/13/2019    LABA1C 7.7 (H) 11/21/2019    LABMICR 459.9 (H) 04/26/2018       No orders to display       Assessment   Active Hospital Problems    Diagnosis    Acute kidney injury ([facility] [N17.9]     Priority: High    Anemia [D64.9]     Priority: Medium    Chronic systolic congestive heart failure (HCC) [I50.22]     Priority: Medium    CKD (chronic kidney disease) stage 3, GFR 30-59 ml/min ([facility]) [N18.30]     Priority: Medium    Vitamin D deficiency [E55.9]    Abnormal nuclear stress test [R94.39]    Cardiomy[facility] [I42.9]    VHD (valvular heart disease) [I38]    Severe mitral regurgitation [I34.0]    Nonrheumatic aortic valve stenosis [I35.0]    Pulmonary HTN ([facility] [I27.20]    Essential hypertension [I10]    Mixed hyperlipidemia [E78.2]    Type 2 diabetes mellitus with complication, without long-term current use of insulin ([facility] [E11.8]    Arthritis [M19.90]    H/O: gout [Z87.39]    Hiatal hernia [K44.9]    Gastroesophageal reflux disease [K21.9]    Chronic blood loss anemia [D50.0]    PVD (peripheral vascular disease) with claudication (HCC) [I73.9]    Atherosclerosis of native arteries of the extremities with ulceration ([facility] [I70.25]         Plan  · LUCI (improving) on CKD3 (baseline sCr ~ 1.4): Follow BMP. IVF (cautious due to h/o CHF). · Chronic systolic CHF: Hold Lasix, Zaroxolyn, Entresto. Cardio consult for med adjustment recommendations. Consider repeat echo. · Weakness: PT/OT--16/24. PM&R consultation deferred.    · Anemia, microcytic: Noted Fe/Ferritin/TIBC/vitamin B12/folate. Follow H&H. Check stool for occult blood. · DM, controlled: Continue home DM meds-- adjust as needed. SSI. HbA1c 7.9%. Wean steroids. · Diarrhea: C diff. · Follow labs  · DVT prophylaxis. · Please see orders for further management and care. ·  for discharge planning  · Discharge plan: SNF soon     The patient was seen and evaluated by myself and Chetan Mcdowell MD PGY-1  11/26/2020 7:22 AM    Addendum: I have personally participated in a face-to-face history and physical exam on the date of service with the patient. I have discussed the case with the resident. I also participated in medical decision making with the resident on the date of service and I agree with all of the pertinent clinical information unless indicated in my editing of the note. I have reviewed and edited the note above based on my findings during my history, exam, and decision making on the same day of service. My additional thoughts:    Continue IVF hydration. [facility] consultation   Case discussed w/ son    Electronically signed by Nasrin Coulter DO on 11/26/2020 at 9:41 AM    I can be reached through [facility].

## 2020-11-26 NOTE — PROGRESS NOTES
Pt refusing to wear PCDs to bed. She was educated on the importance of wearing them but still requests them off.

## 2020-11-26 NOTE — CONSULTS
CARDIOLOGY CONSULTATION    Patient Name:  Carl Merida    :  1927    Reason for Consultation:   Mitral regurgitation - severe; coronary artery disease    History of Present Illness:   Carl Merida presents to [facility] following a recent history of profuse diarrhea and profound weakness. He is presently being tested for C. difficile. She also has known severe mitral regurgitation as documented on previous cardiac ultrasounds. She was to undergo a percutaneous mitral valve replacement but after much to do decided against it. Thus she continues to have significant symptoms of shortness of breath with any form of exertion. Past Medical History:   has a past medical history of Arthritis, CHF (congestive heart failure) ([facility] [address] CKD (chronic kidney disease), Diabetic eye exam ([facility] [address] Hyperlipidemia, Hypertension, Iron deficiency anemia, Type II or unspecified type diabetes mellitus without mention of complication, not stated as uncontrolled, Ulcer of foot ([facility] [address] and Valvular heart disease. Surgical History:   has a past surgical history that includes Appendectomy; Cholecystectomy; Carpal tunnel release; Cataract removal; Rotator cuff repair; Tonsillectomy; vascular surgery (2013); Upper gastrointestinal endoscopy (2015); joint replacement; Endoscopy, colon, diagnostic; us kidney duplex bilat (2016); Adenoidectomy; Total knee arthroplasty; Cataract removal; Upper gastrointestinal endoscopy (2017); doppler echocardiography (2018); and transesophageal echocardiogram (2019). Social History:   reports that she has never smoked. She has never used smokeless tobacco. She reports current alcohol use. She reports that she does not use drugs. Family History:  family history is not on file. Medications:  Prior to Admission medications    Medication Sig Start Date End Date Taking?  Authorizing Provider   Insulin Degludec (TRESIBA SC) Inject 34 Units into the skin 2 times daily Once in the AM before breakfast and once at night   Yes Historical Provider, MD   metoclopramide (REGLAN) 5 MG tablet Take 5 mg by mouth 3 times daily (with meals)   Yes Historical Provider, MD   clopidogrel (PLAVIX) 75 MG tablet TAKE ONE TABLET BY MOUTH DAILY 11/23/20  Yes Tyson Sadler MD   hydrOXYzine (ATARAX) 25 MG tablet Take 1 tablet by mouth every 8 hours as needed for Itching 11/23/20  Yes Tyson Sadler MD   QUEtiapine (SEROQUEL) 25 MG tablet Take 0.5 tablets by mouth 2 times daily  Patient taking differently: Take 75 mg by mouth nightly  9/24/20  Yes Tyson Sadler MD   predniSONE ([place]) 5 MG tablet TAKE ONE TABLET BY MOUTH DAILY  Patient taking differently: Take 2.5 mg by mouth daily  9/14/20  Yes Tyson Sadler MD   metOLazone (ZAROXOLYN) 2.5 MG tablet Take 1 tablet by mouth three times a week 8/28/20  Yes Carmen Trevizo DO   colchicine (COLCRYS) 0.6 MG tablet Take 0.6 mg by mouth daily as needed (gout attack)    Yes Historical Provider, MD   vitamin D (ERGOCALCIFEROL) 1.25 MG (49175 UT) CAPS capsule Take 50,000 Units by mouth once a week Given Wednesday   Yes Historical Provider, MD   carvedilol (COREG) 3.125 MG tablet Take 3.125 mg by mouth 2 times daily (with meals)    Yes Historical Provider, MD   bumetanide (BUMEX) 1 MG tablet Take 1 tablet by mouth daily 10/18/19  Yes Historical Provider, MD   sacubitril-valsartan (ENTRESTO) 24-26 MG per tablet Take 1 tablet by mouth 2 times daily    Yes Historical Provider, MD   allopurinol (ZYLOPRIM) 100 MG tablet Take 100 mg by mouth 2 times daily  12/27/17  Yes Historical Provider, MD   Continuous Blood Gluc Sensor (FREESTYLE SHON 14 DAY SENSOR) MISC E11.8 11/3/20   Tyson Sadler MD   Continuous Blood Gluc  (FREESTYLE SHON 14 DAY READER) TANA e11.8 11/3/20   Tyson Sadler MD   blood glucose test strips (ASCENSIA AUTODISC VI;ONE TOUCH ULTRA TEST VI) strip 1 each by In Vitro route 4 times daily Use Normocephalic. No masses, lesions, tenderness or abnormalities  Eyes: Conjunctivae/corneas clear. PERRL, EOMs intact. Sclera non icteric. Ears: External ears normal. Canals clear. TM's clear bilaterally. Hearing normal to finger rub. Nose/Sinuses: Nares normal. Septum midline. Mucosa normal. No drainage or sinus tenderness. Oropharynx: Lips, mucosa, and tongue normal. Oropharynx clear with no exudate seen. Neck: Neck supple and symmetric. No adenopathy. Thyroid symmetric, normal size, without nodules. Trachea is midline. Carotids brisk in upstroke without bruits, no abnormal JVP noted at 45°. Chest: Even excursion  Lungs: Lungs   Grosslyclear to auscultation bilaterally. No retractions or use of accessory muscles. No tactile vocal fremitus. No rhonchi, crackles or rales. Heart:  S1 > S2. Regular rhythm. No gallop Grade 7-1/7.GPOJSWB systolic murmur heard at the apex radiating to the axilla. No rub, palpable thrill or heave noted. PMI 5th intercostal space midclavicular line. Abdomen: Abdomen soft, moderately protuberant, non-tender. BS normal. No masses, organomegaly. No hernia noted. Extremities: Extremities normal. No deformities, edema, or skin discoloration. No cyanosis or clubbing noted to the nails. Peripheral pulses present 2+ upper extremities and present 1+  lower extremities. Musculoskeletal: Spine ROM normal. Muscular strength intact. Neuro: Cranial nerves intact. Motor: Strength 5/5 in all extremities. Reflexes 2+ in all extremities. No focal weakness. Sensory: grossly normal to touch. Coordination intact.     Pertinent Labs:  CBC:   Recent Labs     11/24/20 1705 11/25/20  0400 11/26/20  0330   WBC 12.5* 12.1* 10.3   HGB 10.0* 10.4* 10.2*    267 274     BMP:  Recent Labs     11/24/20  1705 11/25/20  0400 11/26/20  0330   * 132 135   K 3.9 3.7 3.5   CL 91* 93* 98   CO2 25 24 23   BUN 96* 89* 70*   CREATININE 2.5* 2.1* 1.7*   GLUCOSE 197* 109* 86   LABGLOM 18 22 28     ABGs: No results found for: PH, PO2, PCO2  INR:   Recent Labs     20  1705   INR 1.0     PRO-BNP:   Lab Results   Component Value Date    PROBNP 19,505 (H) 2020    PROBNP 4,856 (H) 2016      Cardiac Injury Profile:   Recent Labs     20  1705   TROPONINI 0.03      Lipid Profile:   Lab Results   Component Value Date    TRIG 219 2018    HDL 43 2018    LDLCALC 118 2018    CHOL 205 2018      Thyroid:   Lab Results   Component Value Date    TSH 4.09 2019      Hemoglobin A1C: No components found for: HGBA1C   ECG:  See report    Radiology:  Xr Chest Portable    Result Date: 2020  Patient MRN:  [id number] : 1927 Age: 80 years Gender: Female Order Date:  2020 12:50 AM TECHNIQUE/NUMBER OF IMAGES/COMPARISON/CLINICAL HISTORY: Chest AP 1 image one view Comparison Suly 3. Difficult breathing. FINDINGS: Heart is enlarged. Increased density seen in both lower lung base which can represent infiltrates. The cannot excluded a small bilateral pleural effusions. The patient is rotated to the left. Cardiomegaly. The bibasilar infiltrates. Possible small bilateral perfusions.     Assessment:    Principal Problem:    Acute kidney injury ([address]  Active Problems:    PVD (peripheral vascular disease) with claudication ([facility])    Atherosclerosis of native arteries of the extremities with ulceration ([facility])    Abnormal nuclear stress test    Cardiomyopathy ([address]    VHD (valvular heart disease)    Severe mitral regurgitation    Nonrheumatic aortic valve stenosis    Pulmonary HTN (HCC)    Chronic systolic congestive heart failure (HCC)    Essential hypertension    Mixed hyperlipidemia    CKD (chronic kidney disease) stage 3, GFR 30-59 ml/min (HCC)    Type 2 diabetes mellitus with complication, without long-term current use of insulin (HCC)    Arthritis    H/O: gout    Hiatal hernia    Gastroesophageal reflux disease    Chronic blood loss anemia    Vitamin D deficiency    Anemia  Resolved Problems:    * No resolved hospital problems. *      Plan:  Based upon Mrs. Nuzhat Chavez most recent hospitalization it appear that her dehydration is most likely related to her profuse diarrhea. She has been checked for C. difficile as well which the results apparently are presently pending. As for her cardiac status, she is elected not to undergo any procedures to repair her mitral valve which has significant calcification in the annular structure. Indeed her mitral regurgitation is severe. Would carefully rehydrate her and obtain a proBNP as well. I have spent more than   45 minutes face to face with Cheyenne Isbell reviewing notes and laboratory data with greater than 50% of this time instructing and counseling the patient regarding my findings and recommendations and I have answered all questions as posed to me by Ms. Pam Veto. Thank you, Eugenio Quiñones MD for allowing me to consult in the care of this patient. Dani Aguiar DO, FACP, FACC, [facility]    NOTE:  This report was transcribed using voice recognition software. Every effort was made to ensure accuracy; however, inadvertent computerized transcription errors may be present.

## 2020-11-26 NOTE — PLAN OF CARE
Problem: Pain:  Description: Pain management should include both nonpharmacologic and pharmacologic interventions.   Goal: Pain level will decrease  Description: Pain level will decrease  Outcome: Met This Shift  Goal: Control of acute pain  Description: Control of acute pain  Outcome: Met This Shift  Goal: Control of chronic pain  Description: Control of chronic pain  Outcome: Met This Shift     Problem: Falls - Risk of:  Goal: Will remain free from falls  Description: Will remain free from falls  Outcome: Met This Shift  Goal: Absence of physical injury  Description: Absence of physical injury  Outcome: Met This Shift     Problem: Skin Integrity:  Goal: Will show no infection signs and symptoms  Description: Will show no infection signs and symptoms  Outcome: Met This Shift  Goal: Absence of new skin breakdown  Description: Absence of new skin breakdown  Outcome: Met This Shift

## 2020-11-27 NOTE — CARE COORDINATION
Social Work discharge planning   Sw noted Dr Anam Kilgore advisement 11/25 re: Dr Shamir Anders wanting pt to go to [facility] to be followed by Dr Dara Angulo there. Sw made referral to Yon Romano with [facility] snf at this time. Await their eval.  Gina lvm for katlyn Gerber at [phone] to confirm this info. Electronically signed by Morral Person on 11/27/2020 at 8:38 AM     Addendum-    Rapid Covid test needed. Discussed with CM supervisor for SNF. Gina heard back from dtr Zabrina, who advised they are now considering bringing pt home IF they can set up private duty care for pt at home. Gina notified charge RN Estrella Shepard. Await final decision on discharge plan. Electronically signed by Lisa Person on 11/27/2020 at 11:05 AM     Addendum-    GINA left message for katlyn Gerber that [facility] has accepted pt IF they decide that is the plan. Await pt/family decision.   Electronically signed by Morral Person on 11/27/2020 at 12:15 PM

## 2020-11-27 NOTE — PROGRESS NOTES
Internal Medicine Progress Note    Patient's name: Malika Hoover  : 1927  Admission date: 2020  Date of service: 2020   Room: [address] MED SURG  Primary care physician: Bridgett Shin MD  Reason for visit: Follow-up for weakness    Subjective  Rk Doherty was seen and examined at bedside. Her diarrhea has slowed down. She has not had any today. Patient has had 2 episodes of lower sugars the past 2 mornings, where sugars are in the 60s, will adjust Lantus dose. Review of Systems  There are no new complaints of chest pain, shortness of breath, abdominal pain, nausea, vomiting, constipation.     Hospital Medications  Current Facility-Administered Medications   Medication Dose Route Frequency Provider Last Rate Last Dose    0.9% NaCl with KCl 20 mEq infusion   Intravenous Continuous Ignacio Be, DO        potassium chloride (KLOR-CON M) extended release tablet 40 mEq  40 mEq Oral Once Ignacio Be, DO        predniSONE (DELTASONE) tablet 2.5 mg  2.5 mg Oral Q MWF Ignacio Be, DO        sodium chloride flush 0.9 % injection 10 mL  10 mL Intravenous 2 times per day Ignacio Maldonadoino, DO   10 mL at 20    sodium chloride flush 0.9 % injection 10 mL  10 mL Intravenous PRN Ignacio Maldonadoino, DO        acetaminophen (TYLENOL) tablet 650 mg  650 mg Oral Q6H PRN Ignacio Be, DO        Or    acetaminophen (TYLENOL) suppository 650 mg  650 mg Rectal Q6H PRN Ignacio Maldonadoino, DO        senna (SENOKOT) tablet 8.6 mg  1 tablet Oral Daily PRN Ignacio Be, DO        heparin (porcine) injection 5,000 Units  5,000 Units Subcutaneous 3 times per day Ignacio Maldonadoino, DO   5,000 Units at 20 0611    insulin lispro (HUMALOG) injection vial 0-12 Units  0-12 Units Subcutaneous TID WC Ignacio Maldonadoino, DO   2 Units at 20 1600    insulin lispro (HUMALOG) injection vial 0-6 Units  0-6 Units Subcutaneous Nightly Ignacio Maldonadoino, DO   2 Units at 20 1945    glucose (GLUTOSE) 40 % oral gel 15 g  15 g Oral PRN Ignacio eB, DO        dextrose 50 % IV solution  12.5 g Intravenous PRN Ignacio Maldonadoino, DO        glucagon (rDNA) injection 1 mg  1 mg Intramuscular PRN Ignacio Be, DO        dextrose 5 % solution  100 mL/hr Intravenous PRN Ignacio Maldonadoino, DO        allopurinol (ZYLOPRIM) tablet 100 mg  100 mg Oral BID Ignacio Be, DO   100 mg at 11/26/20 1945    carvedilol (COREG) tablet 3.125 mg  3.125 mg Oral BID WC Ignacio Maldonadoino, DO   3.125 mg at 11/26/20 1608    clopidogrel (PLAVIX) tablet 75 mg  75 mg Oral Daily Ignacio Maldonadoino, DO   75 mg at 11/26/20 1132    metoclopramide (REGLAN) tablet 5 mg  5 mg Oral TID AC Ignacio eB, DO   5 mg at 11/27/20 0611    QUEtiapine (SEROQUEL) tablet 75 mg  75 mg Oral Nightly Ignacio Be, DO   75 mg at 11/26/20 1946    hydrOXYzine (VISTARIL) capsule 25 mg  25 mg Oral Q8H PRN Ignacio Be, DO        insulin glargine (LANTUS) injection vial 34 Units  34 Units Subcutaneous BID Ignacio Be, DO   34 Units at 11/26/20 1945    white petrolatum ointment   Topical PRN Ignacio Be, DO           PRN Medications  sodium chloride flush, acetaminophen **OR** acetaminophen, senna, glucose, dextrose, glucagon (rDNA), dextrose, hydrOXYzine, white petrolatum    Objective  Most Recent Recorded Vitals  /64   Pulse 83   Temp 98 °F (36.7 °C) (Oral)   Resp 18   Ht 5' 3\" (1.6 m)   Wt 174 lb (78.9 kg)   SpO2 99%   BMI 30.82 kg/m²   I/O last 3 completed shifts: In: 1160 [P.O.:840; I.V.:900]  Out: 1900 [Urine:1900]  I/O this shift:  In: 1140 [P.O.:240;  I.V.:900]  Out: 1300 [Urine:1300]    Physical Exam:   General: AAO to person/place/time/purpose, NAD, no labored breathing, appears tired  Eyes: conjunctivae/corneas clear, sclera non icteric  Skin: color/texture/turgor normal, no rashes or lesions  Lungs: CTAB, no retractions/use of accessory muscles, no vocal fremitus, no rhonchi, no crackle, no rales  Heart: regular rate, regular rhythm, no Entresto. Cardio consult for med adjustment recommendations. Consider repeat echo. Follow BNP. · Weakness: PT/OT--16/24. PM&R consultation deferred. · Anemia, microcytic: Noted Fe/Ferritin/TIBC/vitamin B12/folate. Follow H&H. Check stool for occult blood. · DM, controlled: Continue home DM meds-- adjust as needed. SSI. HbA1c 7.9%. Wean steroids. · Diarrhea: C diff check pending. Hold Reglan. · Hypokalemia: Replace and monitor. · Follow labs  · DVT prophylaxis. · Please see orders for further management and care. ·  for discharge planning  · Discharge plan: SNF soon     The patient was seen and evaluated by myself and Abbie Figueroa MD PGY-1  11/27/2020 8:08 AM    I can be reached through [facility]. Addendum: I have personally participated in a face-to-face history and physical exam on the date of service with the patient. I have discussed the case with the resident. I also participated in medical decision making with the resident on the date of service and I agree with all of the pertinent clinical information unless indicated in my editing of the note. I have reviewed and edited the note above based on my findings during my history, exam, and decision making on the same day of service. My additional thoughts:    Cut back Lantus. She eats more sugar at home   Hold Reglan due to diarrhea   Patient refusing SNF-discussed with son-she will call me back regarding plan   Restart Wanna Render and Bumex-discharge off of Zaroxolyn    Electronically signed by Myrtle Liu DO on 11/27/2020 at 9:38 AM    I can be reached through [facility].

## 2020-11-27 NOTE — PROGRESS NOTES
PROGRESS NOTE       PATIENT PROBLEM LIST:  Principal Problem:    Acute kidney injury ([facility] [address]  Active Problems:    PVD (peripheral vascular disease) with claudication ([facility])    Atherosclerosis of native arteries of the extremities with ulceration ([facility])    Abnormal nuclear stress test    Cardiomyopathy ([facility] [address]    VHD (valvular heart disease)    Severe mitral regurgitation    Nonrheumatic aortic valve stenosis    Pulmonary HTN (HCC)    Chronic systolic congestive heart failure (HCC)    Essential hypertension    Mixed hyperlipidemia    CKD (chronic kidney disease) stage 3, GFR 30-59 ml/min ([facility])    Type 2 diabetes mellitus with complication, without long-term current use of insulin ([facility])    Arthritis    H/O: gout    Hiatal hernia    Gastroesophageal reflux disease    Chronic blood loss anemia    Vitamin D deficiency    Anemia  Resolved Problems:    * No resolved hospital problems. *      SUBJECTIVE:  Dany Theodore states does not really care but insists on going home. She is already denied any possible surgical intervention whether it be catheter-based etc.    REVIEW OF SYSTEMS:  General ROS: negative for - fatigue, malaise,  weight gain or weight loss  Psychological ROS: negative for - anxiety , depression  Ophthalmic ROS: negative for - decreased vision or visual distortion. ENT ROS: negative  Allergy and Immunology ROS: negative  Hematological and Lymphatic ROS: negative  Endocrine: no heat or cold intolerance and no polyphagia, polydipsia, or polyuria  Respiratory ROS: positive for - cough and shortness of breath  Cardiovascular ROS: positive for - chest pain.   Gastrointestinal ROS: no abdominal pain, change in bowel habits, or black or bloody stools  Genito-Urinary ROS: no nocturia, dysuria, trouble voiding, frequency or hematuria  Musculoskeletal ROS: negative for- myalgias, arthralgias, or claudication  Neurological ROS: no TIA or stroke symptoms otherwise no significant change in symptoms or problems since yesterday as documented in previous progress notes. SCHEDULED MEDICATIONS:   insulin glargine  30 Units Subcutaneous BID    bumetanide  1 mg Oral Daily    sacubitril-valsartan  1 tablet Oral BID    predniSONE  2.5 mg Oral Q MWF    sodium chloride flush  10 mL Intravenous 2 times per day    heparin (porcine)  5,000 Units Subcutaneous 3 times per day    insulin lispro  0-12 Units Subcutaneous TID WC    insulin lispro  0-6 Units Subcutaneous Nightly    allopurinol  100 mg Oral BID    carvedilol  3.125 mg Oral BID WC    clopidogrel  75 mg Oral Daily    [Held by provider] metoclopramide  5 mg Oral TID AC    QUEtiapine  75 mg Oral Nightly       VITAL SIGNS:                                                                                                                          BP (!) 174/85   Pulse 104   Temp 97.5 °F (36.4 °C) (Oral)   Resp 18   Ht 5' 3\" (1.6 m)   Wt 174 lb (78.9 kg)   SpO2 99%   BMI 30.82 kg/m²   Patient Vitals for the past 96 hrs (Last 3 readings):   Weight   11/27/20 0245 174 lb (78.9 kg)   11/26/20 0022 173 lb (78.5 kg)   11/25/20 0118 164 lb 6.4 oz (74.6 kg)     OBJECTIVE:    HEENT: PERRL, EOM  Intact; sclera non-icteric, conjunctiva pink. Carotids are brisk in upstroke with normal contour. No carotid bruits. Normal jugular venous pulsation at 45°. No palpable cervical nor supraclavicular nodes. Thyroid not palpable. Trachea midline. Chest: Even excursion  Lungs: CTA B, no expiratory wheezes or rhonchi, no decreased tactile fremitus without inspiratory rales. Heart: Regular  rhythm; S1 > S2, no gallop or murmur. No clicks, rub, palpable thrills   or heaves. PMI nondisplaced, 5th intercostal space MCL. Abdomen: Soft, nontender, nondistended,  moderately protuberant, no masses or organomegaly. Bowel sounds active. Extremities: Without clubbing, cyanosis or edema. Pulses present 3+ upper extermities bilaterally; present 1+ DP and present 1+ PT bilaterally.      Data: Scheduled Meds: Reviewed  Continuous Infusions:    dextrose         Intake/Output Summary (Last 24 hours) at 11/27/2020 1013  Last data filed at 11/27/2020 0946  Gross per 24 hour   Intake 1758 ml   Output 3200 ml   Net -1442 ml     CBC:   Recent Labs     11/24/20  1705 11/25/20  0400 11/26/20  0330 11/27/20  0335   WBC 12.5* 12.1* 10.3 9.6   HGB 10.0* 10.4* 10.2* 9.6*   HCT 30.7* 32.5* 33.3* 31.0*    267 274 253     BMP:  Recent Labs     11/24/20  1705 11/25/20  0400 11/26/20  0330 11/27/20  0335   * 132 135 136   K 3.9 3.7 3.5 3.4*   CL 91* 93* 98 102   CO2 25 24 23 21*   BUN 96* 89* 70* 44*   CREATININE 2.5* 2.1* 1.7* 1.4*   LABGLOM 18 22 28 35     ABGs: No results found for: PH, PO2, PCO2  INR:   Recent Labs     11/24/20  1705   INR 1.0     PRO-BNP:   Lab Results   Component Value Date    PROBNP 1,805 (H) 11/27/2020    PROBNP 754 (H) 11/26/2020      TSH:   Lab Results   Component Value Date    TSH 4.09 08/13/2019      Cardiac Injury Profile:   Recent Labs     11/24/20  1705   TROPONINI 0.03      Lipid Profile:   Lab Results   Component Value Date    TRIG 219 11/23/2018    HDL 43 11/23/2018    LDLCALC 118 11/23/2018    CHOL 205 11/23/2018      Hemoglobin A1C: No components found for: HGBA1C     RAD:   Ct Head Wo Contrast    Result Date: 11/24/2020  EXAMINATION: CT OF THE HEAD WITHOUT CONTRAST  11/24/2020 4:44 pm TECHNIQUE: CT of the head was performed without the administration of intravenous contrast. Dose modulation, iterative reconstruction, and/or weight based adjustment of the mA/kV was utilized to reduce the radiation dose to as low as reasonably achievable. COMPARISON: Suly 3 HISTORY: ORDERING SYSTEM PROVIDED HISTORY: ams TECHNOLOGIST PROVIDED HISTORY: Reason for exam:->ams Has a \"code stroke\" or \"stroke alert\" been called? ->No FINDINGS: BRAIN/VENTRICLES: There is no acute intracranial hemorrhage, mass effect or midline shift. No abnormal extra-axial fluid collection.   The gray-white differentiation is maintained without evidence of an acute infarct. There is prominence of the ventricles and sulci due to global parenchymal volume loss. There are nonspecific areas of hypoattenuation within the periventricular and subcortical white matter, which likely represent chronic microvascular ischemic change. Small chronic right basal ganglia lacunar infarction. ORBITS: The visualized portion of the orbits demonstrate no acute abnormality. SINUSES: The visualized paranasal sinuses and mastoid air cells demonstrate no acute abnormality. SOFT TISSUES/SKULL: No acute abnormality of the visualized skull or soft tissues. No acute intracranial abnormality. Age-related loss of brain volume and chronic periventricular ischemic changes. Small chronic right basal ganglia lacunar infarction. Xr Shoulder Left (min 2 Views)    Result Date: 11/24/2020  EXAMINATION: THREE XRAY VIEWS OF THE LEFT SHOULDER 11/24/2020 4:17 pm COMPARISON: None. HISTORY: ORDERING SYSTEM PROVIDED HISTORY: fall. Trauma. TECHNOLOGIST PROVIDED HISTORY: Reason for exam:->fall FINDINGS: Total of four views. AP internal and external rotation, lateral and scapular views. There is no acute fracture or dislocation. Degenerative type narrowing with mild sclerosis and flattening of the superior and medial humeral head margin. Small inferior medial humeral head spur. Mild sclerotic change of the glenoid margin with only a tiny superior spur. The patient was able to do internal and external rotation. No evidence of impingement. No significant spurring at the [facility] joint. Incidental note is made of a 2.8 mm round electronic device overlying the soft tissues anterolateral upper arm on all views. Visualized proximal to mid humerus is intact. Thoracic kyphosis and thoracic degeneration with the thoracic spine not adequately demonstrated. Mild cardiomegaly. Mild congestive change suggested in the left lung.     1.  No acute fracture or dislocation. 2.  Mild degenerative change. Xr Chest Portable    Result Date: 11/24/2020  EXAMINATION: ONE XRAY VIEW OF THE CHEST 11/24/2020 4:17 pm COMPARISON: 08/24/2020 HISTORY: ORDERING SYSTEM PROVIDED HISTORY: fall TECHNOLOGIST PROVIDED HISTORY: Reason for exam:->fall FINDINGS: The heart is mildly enlarged. The right lung is clear. Left upper lobe is clear. The left lung base is suboptimally visualized due to the cardiomegaly and chronic elevation of left hemidiaphragm. 1. The right lung and left upper lobe are clear. 2. Cardiomegaly. No findings of failure 3. The left lung base cannot be evaluated due to the cardiomegaly. Xr Hip 2-3 Vw W Pelvis Left    Result Date: 11/24/2020  EXAMINATION: ONE XRAY VIEW OF THE PELVIS AND TWO XRAY VIEWS LEFT HIP 11/24/2020 4:17 pm COMPARISON: X-ray pelvis and left hip 06/03/2020. HISTORY: ORDERING SYSTEM PROVIDED HISTORY: fall. Trauma. TECHNOLOGIST PROVIDED HISTORY: Reason for exam:->fall FINDINGS: There has been and interval surgical pinning of the comminuted intertrochanteric proximal left femur fracture with some straightening of the previous posttraumatic angulation. Evidence for fracture healing. There is residual deformity of the greater and lesser trochanters with some displaced fragments and myositis. 1 large and 1 small left femoral neck pin. These do not protrude beyond the femoral head margin and appear adequately placed. No evidence for hardware failure. The proximal portion of the femoral post is intact. The distal portion of the hardware fixation is not included. No evidence for definite acute fracture or dislocation at the left hip. Minimal chronic deformity of the left inferior pubic ramus. Chronic mild spurring and sclerosis at the inferior left sacroiliac joint. Intact right hip without any significant degenerative change. Bony structures are osteopenic. Right SI joint also shows chronic mild inferior spurring.   Chronic moderate degenerative changes in the lumbar spine. Does the calcified small aortic bifurcation. Prominent vascular calcifications in the prominent right and left thigh. 1. Old left proximal femur fracture fixation looks intact, with the distal portion is not fully included. 2. No acute fracture or dislocation seen at the pelvis or hips. 3. Please re-evaluate based on patient's location of pain, if pain is left mid to lower thigh get additional left femur views. EKG: See Report  Echo: See Report      IMPRESSIONS:  Principal Problem:    Acute kidney injury ([address]  Active Problems:    PVD (peripheral vascular disease) with claudication ([address]    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)    Abnormal nuclear stress test    Cardiomyopathy ([address]    VHD (valvular heart disease)    Severe mitral regurgitation    Nonrheumatic aortic valve stenosis    Pulmonary HTN (HCC)    Chronic systolic congestive heart failure (HCC)    Essential hypertension    Mixed hyperlipidemia    CKD (chronic kidney disease) stage 3, GFR 30-59 ml/min (HCC)    Type 2 diabetes mellitus with complication, without long-term current use of insulin (HCC)    Arthritis    H/O: gout    Hiatal hernia    Gastroesophageal reflux disease    Chronic blood loss anemia    Vitamin D deficiency    Anemia  Resolved Problems:    * No resolved hospital problems. *      RECOMMENDATIONS:   Continue present cardiac medications and carefully monitor electrolytes and renal dysfunction. I have spent more than  24 minutes face to face with Maryuri Needle and reviewing notes and laboratory data, with greater than 50% of this time instructing and counseling the patient face to face regarding my findings and recommendations and I have answered all questions as posed to me by Ms. Pam Laws. Dani Aguiar, DO FACP,FACC,FSCAI      NOTE:  This report was transcribed using voice recognition software.   Every effort was made to ensure accuracy; however, inadvertent computerized transcription errors may be present

## 2020-11-27 NOTE — PROGRESS NOTES
Occupational Therapy  OCCUPATIONAL THERAPY INITIAL EVALUATION      Date:2020  Patient Name: Kirit Rodrigez  MRN: [id number]  : 1927  Room: [address] [place] [address]-A    Referring Provider: Irene Kendall DO   Evaluating OT: Felton Joshi. Yuval Gonsalves - [phone]    AM-PAC Daily Activity Raw Score: 16      Recommended Adaptive Equipment: Continue to assess. Diagnosis: Acute kidney injury ([facility] [address] [N17.9]      Pertinent Medical History: CHF, CKD, HTN, DM, arthritis      Precautions: falls, contact isolation, chair alarm, hearing impairment    Home Living: Patient lives with her daughter in a two-floor home; patient's bedroom and bathroom are on the main living level. Bathroom Setup: walk-in shower (with seat and grab bars available) and standard-height toilet (with rails)  Equipment Owned: walker    Prior Level of Function (PLOF): Patient reported that she was independent with ADLs, needed assistance with IADLs, and independent with functional mobility (with walker) prior to this hospitalization. Patient reported that family can assist with ADLs, as needed. Pain Level: Patient denied experiencing pain. Cognition: Patient alert and oriented grossly. WFL command follow demonstrated. Patient pleasant, cooperative, and motivated to return to [facility] and home environment. Memory: Fair   Sequencing: WFL   Problem Solving: Fair+   Judgement/Safety: Fair    Functional Assessment:   Initial Eval Status  Date: 2020 Treatment Status  Date:  Short Term Goals   Feeding Setup     Grooming Min A given while standing at sink for completion of hand hygiene and oral hygiene. Cues given with management of walker to maximize safety. Mod I  (seated/standing at sink)   UB Dressing SBA  Independent   LB Dressing Mod A  SBA - with use of AE, as needed/appropriate   Bathing Mod A  SBA - with use of AE/DME, as needed/appropriate   Toileting Min A with toilet transfer; Min A for hygiene while standing with walker.   Mod I   Bed Mobility  Not assessed. Patient seated in bedside recliner chair at start and conclusion of this session. Functional Transfers Sit-to-Stand: Min A   from bedside chair  Independent   Functional Mobility Min A   (with walker)  Mod I with functional mobility (with device, as needed/appropriate) in order to maximize independence with ADLs/IADLs and other functional tasks. Balance Sitting: Good  (at edge of chair)  Standing: Fair-  (with walker)  Fair+ dynamic standing balance during completion of ADLs/IADLs and other functional tasks. Activity Tolerance Fair  Patient will demonstrate Good understanding and consistent implementation of energy conservation techniques and work simplification techniques into ADL/IADL routines. Visual/  Perceptual WFL     N/A   B UE Strength 3+/5 grossly  Patient will demonstrate 4/5 B UE strength in order to maximize independence with ADLs and functional transfers. Long-Term Goal: Patient will increase functional independence to PLOF in order to allow patient to live in least restrictive environment. ROM: Additional Information:    R UE  WFL    L UE WFL      Hearing: Impaired - wears bilateral hearing aids  Sensation: No complaints of numbness/tingling in B UEs. Tone: WFL  Edema: No    Comments: RN approved patient's participation in OT session. Upon arrival, patient seated in bedside chair. At end of session, patient seated in bedside chair with call light and phone within reach, chair alarm activated, patient's nurse present, and all lines and tubes intact. Patient would benefit from continued skilled OT to increase safety and independence with completion of ADL/IADL tasks for functional independence and quality of life. Treatment: OT treatment provided this date included:    Instruction/training on safety and adapted techniques for completion of ADLs.   Instruction/training on safe functional mobility/transfer techniques.      Instruction/training on energy conservation/work simplification for completion of ADLs. Patient education provided regardin) importance of having staff assistance with ADLs and other OOB activities to prevent falls/injury during hospitalization, 2) techniques to maximize safety with use/management of walker. Patient indicated [address] understanding. Further skilled OT treatment indicated to increase patient's safety and independence with completion of ADL/IADL tasks in order to maximize patient's functional independence and quality of life.     Assessment of Current Deficits:   Functional Mobility [x]  ADLs [x] Strength [x]  Cognition []  Functional Transfers  [x] IADLs [x] Safety Awareness [x]  Endurance [x]  Fine Motor Coordination [] Balance [x] Vision/Perception [] Sensation []   Gross Motor Coordination [] ROM [] Delirium []                  Motor Control []    Plan of Care: 2-5 days/week for 1-2 weeks PRN  [x]ADL retraining/adaptive techniques and AE recommendations to increase functional independence within precautions  [x]Energy conservation techniques to improve tolerance for ADLs/IADLs  [x]Functional transfer/mobility training/DME recommendations for increased independence, safety and fall prevention  [x]Patient/family education to increase safety and functional independence  [x]Environmental modifications for safe mobility and completion of ADLs/IADLs  []Cognitive retraining exercises to improve problem solving skills & safe participation in ADLs/IADLs   []Sensory re-education techniques to improve extremity awareness, maintain skin integrity and improve hand function   []Visual/Perceptual retraining  to improve body awareness and safety during transfers and ADLs   []Splinting/positioning needs to maintain joint/skin integrity and prevent contractures   [x]Therapeutic activity to improve functional performance during ADLs/IADLs  [x]Therapeutic exercise to improve tolerance and functional strength for ADLs/IADLs  [x]Balance retraining/tolerance tasks for facilitation of postural control with dynamic challenges during ADLs   [x]Neuromuscular re-education: facilitate righting/equilibrium reactions, midline orientation, scapular stability/mobility, normalize muscle tone, and facilitate active functional movement/attention  []Delirium prevention/treatment   []Positioning to improve functional independence and prevent skin breakdown   []Other:     Rehab Potential: Good for established goals. Patient / Family Goal: Patient indicated that she wants to return home. Patient and/or family were instructed on functional diagnosis, prognosis/goals, and OT plan of care. Demonstrated Fair understanding. Eval Complexity: Low    Time In: 0950  Time Out: 1011  Total Treatment Time: 11 minutes      Minutes Units   OT Eval Low 78581 10 1   OT Eval Medium 85544     OT Eval High 99372     OT Re-Eval [id number]     Therapeutic Ex 35292     Therapeutic Activities 60391     ADL/Self Care 67842 11 1   Orthotic Management 35419     Neuro Re-Ed 63902     Non-Billable Time N/A ---     Evaluation time includes thorough review of current medical information, gathering information on past medical history/social history and prior level of function, completion of standardized testing/informal observation of tasks, assessment of data, and education on plan of care and goals. Yenni Yang, OTR/L  License Number: [id number]

## 2020-11-27 NOTE — PROGRESS NOTES
Spoke to [employer] on floor. Patient is being discharged to snf. We will not be filling script sen by Dr. Mi River.

## 2020-11-27 NOTE — CARE COORDINATION
Social Work discharge planning   Sw spoke to Dr Jason Casas and pt/dtr Cass Lemus. Cancelled referral with Amirah Palomares from [facility]. Pt's daughter Juliocesar Arce chose [place]. Ref called to [facility] with [employer] (lvm on his cell) [employer] office [phone] fax [phone]. Sw also emailed list of private duty agencies to Cass Lemus at [email or web address].  Await call back from [facility] at [facility] to see IF they can accept pt for weekend discharge. Electronically signed by Sherry Romberg on 11/27/2020 at 2:40 PM     Addendum-    Per [facility] with [facility], they CAN accept pt if discharged over weekend.    Electronically signed by Sherry Romberg on 11/27/2020 at 3:12 PM

## 2020-11-27 NOTE — PROGRESS NOTES
Physical Therapy    Facility/Department: [address] MED SURG  Treatment Note  NAME: Dany Theodore  : 1927  MRN: [id number]    Date of Service: 2020      Attending Provider:  Lindsey Blevins DO    Evaluating PT:  [place] Reche Husband., P.T. Room #:  6105/4862-L  Diagnosis:  LUCI, fell at home  Imaging: X-rays for R hip and shoulder were negative for fx. Precautions:  Falls, bed/chair alarm     SUBJECTIVE:    Pt lives with daughter in a 2 story home with 3-4 stairs and 1 rail to enter. Her bed and bath are on the first floor. Pt ambulated with ww PTA. OBJECTIVE:   Initial Evaluation  Date: 20 Treatment Short Term/ Long Term   Goals   Was pt agreeable to Eval/treatment? yes yes    Does pt have pain? No c/o pain No c/o pain    Bed Mobility  Rolling: MIN A  Supine to sit: MOD A  Sit to supine: MOD A  Scooting: MOD A Rolling: supervision  Supine to sit: MIN A  Sit to supine: SBA  Scooting: supervision SBA   Transfers Sit to stand: MIN A  Stand to sit: MIN A  Stand pivot: MIN A with ww Sit to stand: SBA  Stand to sit: SBA  Stand pivot: SBA with ww SBA   Ambulation   30 feet with ww MIN A 50 feet with ww  feet with ww SBA   Stair negotiation: ascended and descended NA NA, pt is in isolation 4 steps with 1 rail SBA   AM-PAC 6 Clicks 58/08 37/01      BLE ROM is WFL. BLE strength is grossly 4/5. Balance: sitting EOB supervision and standing with ww SBA    ASSESSMENT:    Comments:  Pt was found sitting up in recliner finishing lunch. She was agreeable to PT. She has improved strength and moving better compared to PT eval and required less assist.  She walked with ww and steady gait. She was able to get into bed with SBA and required MIN A to sit back up. Pt then walked 3 feet to chair next to her bed and sat down.      Treatment:  Patient practiced and was instructed in the following treatment:     Bed mobility, transfers, and gait with ww to improve functional strength and endurance. Pt was left sitting up in recliner chair as found with call light left by patient. Chair/bed alarm: chair alarm was re-activated. PLAN:    Pt is making good progress toward established Physical Therapy goals. Continue with physical therapy current plan of care. Time in  11:35  Time out  11:45    Total Treatment Time  10 minutes     Evaluation Time includes thorough review of current medical information, gathering information on past medical history/social history and prior level of function, completion of standardized testing/informal observation of tasks, assessment of data and education on plan of care and goals. CPT codes:  [] Low Complexity PT evaluation 42924  [] Moderate Complexity PT evaluation 89773  [] High Complexity PT evaluation 25458  [] PT Re-evaluation 49114  [] Gait training 51396 ** minutes  [] Manual therapy 11917 ** minutes  [x] Therapeutic activities 38260 10 minutes  [] Therapeutic exercises 61292 ** minutes  [] Neuromuscular reeducation 80331 ** minutes     Javier Orellana, P.T.   License Number: [id number]

## 2020-11-30 NOTE — DISCHARGE SUMMARY
Physician Discharge Summary     Patient ID:  Mervin Gomez  [id number]  80 y.o.  1/12/1927    Admit date: 11/25/2020    Discharge date and time: 11/28/2020  3:36 PM     Admission Diagnoses: Acute kidney injury [facility]) [N17.9]    Discharge Diagnoses: Acute kidney injury  Chronic kidney disease  Chronic systolic heart failure  Mitral regurgitation  Recurrent hypoglycemia  Dementia  Patient Active Problem List   Diagnosis    PVD (peripheral vascular disease) with claudication ([address]    Atherosclerosis of native arteries of the extremities with ulceration ([address]    Abnormal nuclear stress test    Cardiomyopathy ([address]    VHD (valvular heart disease)    Severe mitral regurgitation    Nonrheumatic aortic valve stenosis    Pulmonary HTN (HCC)    Chronic systolic congestive heart failure ([address]    Essential hypertension    Mixed hyperlipidemia    CKD (chronic kidney disease) stage 3, GFR 30-59 ml/min ([facility])    Type 2 diabetes mellitus with complication, without long-term current use of insulin ([facility])    Arthritis    H/O: gout    Hiatal hernia    Gastroesophageal reflux disease    Gastric polyps    Pyloric stenosis    Chronic blood loss anemia    Vitamin D deficiency    Anemia    Acute kidney injury ([address]       Consults: Cardiology    Procedures:   Hospital Course:  80year-old woman admitted through emergency room due to weakness and inability to ambulate  Found to have acute kidney injury  Responded well to gentle IV hydration  Insulin was reduced in dosage due to recurrent hypoglycemia  Overall she improved  Zaroxolyn was discontinued  Discharged home in stable condition    Discharge Exam:  Patient was seen on the floor on the day of discharge  Sitting up in chair comfortably  Denied any specific symptoms  Oral intake adequate  Pain control adequate  Vitals  Stable  Systolic murmur the apex unchanged  Lungs were clear  No edema  Data reviewed in detail    Disposition: Home    Patient Instructions: mouth 2 times daily Historical Med      allopurinol (ZYLOPRIM) 100 MG tablet Take 100 mg by mouth 2 times daily , R-3Historical Med         STOP taking these medications       metOLazone (ZAROXOLYN) 2.5 MG tablet Comments:   Reason for Stopping:         glipiZIDE (GLUCOTROL XL) 5 MG extended release tablet Comments:   Reason for Stopping:         Alum Hydroxide-Mag Carbonate (GAVISCON EXTRA RELIEF FORMULA) 508-475 MG/10ML SUSP Comments:   Reason for Stopping:             Activity: As tolerated  Diet: General    Follow-up with PCP.     Note that over 40 minutes was spent in preparing discharge papers, discussing discharge with patient, medication review, etc.    Signed:  Michelle Fung MD  11/30/2020  11:10 AM

## 2021-01-01 ENCOUNTER — APPOINTMENT (OUTPATIENT)
Dept: GENERAL RADIOLOGY | Age: 86
DRG: 177 | End: 2021-01-01
Payer: MEDICARE

## 2021-01-01 ENCOUNTER — APPOINTMENT (OUTPATIENT)
Dept: CT IMAGING | Age: 86
DRG: 177 | End: 2021-01-01
Payer: MEDICARE

## 2021-01-01 ENCOUNTER — HOSPITAL ENCOUNTER (INPATIENT)
Age: 86
LOS: 3 days | DRG: 177 | End: 2021-01-12
Attending: EMERGENCY MEDICINE | Admitting: FAMILY MEDICINE
Payer: MEDICARE

## 2021-01-01 VITALS
HEART RATE: 95 BPM | BODY MASS INDEX: 30.39 KG/M2 | DIASTOLIC BLOOD PRESSURE: 72 MMHG | HEIGHT: 64 IN | SYSTOLIC BLOOD PRESSURE: 130 MMHG | TEMPERATURE: 96.9 F | OXYGEN SATURATION: 98 % | RESPIRATION RATE: 32 BRPM | WEIGHT: 178 LBS

## 2021-01-01 DIAGNOSIS — J96.01 ACUTE RESPIRATORY FAILURE WITH HYPOXIA (HCC): Primary | ICD-10-CM

## 2021-01-01 DIAGNOSIS — U07.1 COVID-19: ICD-10-CM

## 2021-01-01 DIAGNOSIS — R40.4 TRANSIENT ALTERATION OF AWARENESS: ICD-10-CM

## 2021-01-01 LAB
AADO2: 601.6 MMHG
ADENOVIRUS BY PCR: NOT DETECTED
ALBUMIN SERPL-MCNC: 3.1 G/DL (ref 3.5–5.2)
ALBUMIN SERPL-MCNC: 3.5 G/DL (ref 3.5–5.2)
ALP BLD-CCNC: 76 U/L (ref 35–104)
ALP BLD-CCNC: 76 U/L (ref 35–104)
ALP BLD-CCNC: 92 U/L (ref 35–104)
ALP BLD-CCNC: 96 U/L (ref 35–104)
ALT SERPL-CCNC: 25 U/L (ref 0–32)
ALT SERPL-CCNC: 26 U/L (ref 0–32)
ALT SERPL-CCNC: 35 U/L (ref 0–32)
ALT SERPL-CCNC: 45 U/L (ref 0–32)
ANION GAP SERPL CALCULATED.3IONS-SCNC: 13 MMOL/L (ref 7–16)
ANION GAP SERPL CALCULATED.3IONS-SCNC: 13 MMOL/L (ref 7–16)
ANION GAP SERPL CALCULATED.3IONS-SCNC: 14 MMOL/L (ref 7–16)
ANION GAP SERPL CALCULATED.3IONS-SCNC: 17 MMOL/L (ref 7–16)
ANION GAP SERPL CALCULATED.3IONS-SCNC: 9 MMOL/L (ref 7–16)
ANISOCYTOSIS: ABNORMAL
APTT: 22.5 SEC (ref 24.5–35.1)
APTT: 23.5 SEC (ref 24.5–35.1)
APTT: 26.5 SEC (ref 24.5–35.1)
APTT: 26.8 SEC (ref 24.5–35.1)
AST SERPL-CCNC: 44 U/L (ref 0–31)
AST SERPL-CCNC: 45 U/L (ref 0–31)
AST SERPL-CCNC: 49 U/L (ref 0–31)
AST SERPL-CCNC: 87 U/L (ref 0–31)
B.E.: -0.3 MMOL/L (ref -3–3)
B.E.: -4.5 MMOL/L (ref -3–3)
B.E.: -6.9 MMOL/L (ref -3–3)
BASOPHILS ABSOLUTE: 0 E9/L (ref 0–0.2)
BASOPHILS ABSOLUTE: 0.01 E9/L (ref 0–0.2)
BASOPHILS ABSOLUTE: 0.01 E9/L (ref 0–0.2)
BASOPHILS RELATIVE PERCENT: 0 % (ref 0–2)
BASOPHILS RELATIVE PERCENT: 0.1 % (ref 0–2)
BASOPHILS RELATIVE PERCENT: 0.2 % (ref 0–2)
BILIRUB SERPL-MCNC: 0.3 MG/DL (ref 0–1.2)
BILIRUB SERPL-MCNC: 0.4 MG/DL (ref 0–1.2)
BILIRUB SERPL-MCNC: 0.5 MG/DL (ref 0–1.2)
BILIRUB SERPL-MCNC: 0.5 MG/DL (ref 0–1.2)
BORDETELLA PARAPERTUSSIS BY PCR: NOT DETECTED
BORDETELLA PERTUSSIS BY PCR: NOT DETECTED
BUN BLDV-MCNC: 40 MG/DL (ref 8–23)
BUN BLDV-MCNC: 43 MG/DL (ref 8–23)
BUN BLDV-MCNC: 46 MG/DL (ref 8–23)
BUN BLDV-MCNC: 56 MG/DL (ref 8–23)
BUN BLDV-MCNC: 65 MG/DL (ref 8–23)
BURR CELLS: ABNORMAL
BURR CELLS: ABNORMAL
C-REACTIVE PROTEIN: 7.2 MG/DL (ref 0–0.4)
CALCIUM SERPL-MCNC: 7.3 MG/DL (ref 8.6–10.2)
CALCIUM SERPL-MCNC: 7.7 MG/DL (ref 8.6–10.2)
CALCIUM SERPL-MCNC: 7.8 MG/DL (ref 8.6–10.2)
CALCIUM SERPL-MCNC: 8.2 MG/DL (ref 8.6–10.2)
CALCIUM SERPL-MCNC: 8.4 MG/DL (ref 8.6–10.2)
CHLAMYDOPHILIA PNEUMONIAE BY PCR: NOT DETECTED
CHLORIDE BLD-SCNC: 100 MMOL/L (ref 98–107)
CHLORIDE BLD-SCNC: 101 MMOL/L (ref 98–107)
CHLORIDE BLD-SCNC: 104 MMOL/L (ref 98–107)
CHLORIDE BLD-SCNC: 95 MMOL/L (ref 98–107)
CHLORIDE BLD-SCNC: 98 MMOL/L (ref 98–107)
CO2: 19 MMOL/L (ref 22–29)
CO2: 20 MMOL/L (ref 22–29)
CO2: 21 MMOL/L (ref 22–29)
CO2: 22 MMOL/L (ref 22–29)
CO2: 24 MMOL/L (ref 22–29)
COHB: 0.3 % (ref 0–1.5)
COHB: 0.5 % (ref 0–1.5)
COHB: 0.7 % (ref 0–1.5)
COMMENT: ABNORMAL
CORONAVIRUS 229E BY PCR: NOT DETECTED
CORONAVIRUS HKU1 BY PCR: NOT DETECTED
CORONAVIRUS NL63 BY PCR: NOT DETECTED
CORONAVIRUS OC43 BY PCR: NOT DETECTED
CREAT SERPL-MCNC: 1.1 MG/DL (ref 0.5–1)
CREAT SERPL-MCNC: 1.2 MG/DL (ref 0.5–1)
CREAT SERPL-MCNC: 1.3 MG/DL (ref 0.5–1)
CREAT SERPL-MCNC: 1.3 MG/DL (ref 0.5–1)
CREAT SERPL-MCNC: 1.5 MG/DL (ref 0.5–1)
CRITICAL: ABNORMAL
D DIMER: 302 NG/ML DDU
D DIMER: 389 NG/ML DDU
D DIMER: 430 NG/ML DDU
D DIMER: 512 NG/ML DDU
DATE ANALYZED: ABNORMAL
DATE OF COLLECTION: ABNORMAL
EKG ATRIAL RATE: 121 BPM
EKG P AXIS: 82 DEGREES
EKG P-R INTERVAL: 164 MS
EKG Q-T INTERVAL: 404 MS
EKG QRS DURATION: 88 MS
EKG QTC CALCULATION (BAZETT): 573 MS
EKG R AXIS: -60 DEGREES
EKG T AXIS: 17 DEGREES
EKG VENTRICULAR RATE: 121 BPM
EOSINOPHILS ABSOLUTE: 0 E9/L (ref 0.05–0.5)
EOSINOPHILS ABSOLUTE: 0.01 E9/L (ref 0.05–0.5)
EOSINOPHILS RELATIVE PERCENT: 0 % (ref 0–6)
EOSINOPHILS RELATIVE PERCENT: 0.1 % (ref 0–6)
FERRITIN: 73 NG/ML
FIBRINOGEN: 652 MG/DL (ref 225–540)
FIBRINOGEN: 681 MG/DL (ref 225–540)
FIBRINOGEN: 697 MG/DL (ref 225–540)
FIBRINOGEN: >700 MG/DL (ref 225–540)
FIO2: 100 %
GFR AFRICAN AMERICAN: 39
GFR AFRICAN AMERICAN: 46
GFR AFRICAN AMERICAN: 46
GFR AFRICAN AMERICAN: 51
GFR AFRICAN AMERICAN: 56
GFR NON-AFRICAN AMERICAN: 32 ML/MIN/1.73
GFR NON-AFRICAN AMERICAN: 38 ML/MIN/1.73
GFR NON-AFRICAN AMERICAN: 38 ML/MIN/1.73
GFR NON-AFRICAN AMERICAN: 42 ML/MIN/1.73
GFR NON-AFRICAN AMERICAN: 46 ML/MIN/1.73
GLUCOSE BLD-MCNC: 128 MG/DL (ref 74–99)
GLUCOSE BLD-MCNC: 131 MG/DL (ref 74–99)
GLUCOSE BLD-MCNC: 155 MG/DL (ref 74–99)
GLUCOSE BLD-MCNC: 168 MG/DL (ref 74–99)
GLUCOSE BLD-MCNC: 248 MG/DL (ref 74–99)
HCO3: 16.8 MMOL/L (ref 22–26)
HCO3: 20.2 MMOL/L (ref 22–26)
HCO3: 22.3 MMOL/L (ref 22–26)
HCT VFR BLD CALC: 26.5 % (ref 34–48)
HCT VFR BLD CALC: 28.6 % (ref 34–48)
HCT VFR BLD CALC: 30.8 % (ref 34–48)
HCT VFR BLD CALC: 32.3 % (ref 34–48)
HCT VFR BLD CALC: 33 % (ref 34–48)
HEMOGLOBIN: 10.1 G/DL (ref 11.5–15.5)
HEMOGLOBIN: 10.1 G/DL (ref 11.5–15.5)
HEMOGLOBIN: 8.4 G/DL (ref 11.5–15.5)
HEMOGLOBIN: 8.9 G/DL (ref 11.5–15.5)
HEMOGLOBIN: 9.7 G/DL (ref 11.5–15.5)
HHB: 10.1 % (ref 0–5)
HHB: 11.8 % (ref 0–5)
HHB: 15.6 % (ref 0–5)
HUMAN METAPNEUMOVIRUS BY PCR: NOT DETECTED
HUMAN RHINOVIRUS/ENTEROVIRUS BY PCR: NOT DETECTED
HYPOCHROMIA: ABNORMAL
HYPOCHROMIA: ABNORMAL
IMMATURE GRANULOCYTES #: 0.05 E9/L
IMMATURE GRANULOCYTES #: 0.3 E9/L
IMMATURE GRANULOCYTES #: 0.38 E9/L
IMMATURE GRANULOCYTES %: 0.8 % (ref 0–5)
IMMATURE GRANULOCYTES %: 1.9 % (ref 0–5)
IMMATURE GRANULOCYTES %: 2.1 % (ref 0–5)
INFLUENZA A BY PCR: NOT DETECTED
INFLUENZA B BY PCR: NOT DETECTED
INR BLD: 1.1
LAB: ABNORMAL
LACTATE DEHYDROGENASE: 225 U/L (ref 135–214)
LACTIC ACID, SEPSIS: 2 MMOL/L (ref 0.5–1.9)
LACTIC ACID: 0.7 MMOL/L (ref 0.5–2.2)
LACTIC ACID: 2.5 MMOL/L (ref 0.5–2.2)
LYMPHOCYTES ABSOLUTE: 0.1 E9/L (ref 1.5–4)
LYMPHOCYTES ABSOLUTE: 0.12 E9/L (ref 1.5–4)
LYMPHOCYTES ABSOLUTE: 0.27 E9/L (ref 1.5–4)
LYMPHOCYTES ABSOLUTE: 0.6 E9/L (ref 1.5–4)
LYMPHOCYTES ABSOLUTE: 0.87 E9/L (ref 1.5–4)
LYMPHOCYTES RELATIVE PERCENT: 0.9 % (ref 20–42)
LYMPHOCYTES RELATIVE PERCENT: 1.7 % (ref 20–42)
LYMPHOCYTES RELATIVE PERCENT: 3.9 % (ref 20–42)
LYMPHOCYTES RELATIVE PERCENT: 4.5 % (ref 20–42)
LYMPHOCYTES RELATIVE PERCENT: 4.9 % (ref 20–42)
Lab: ABNORMAL
MCH RBC QN AUTO: 25.3 PG (ref 26–35)
MCH RBC QN AUTO: 25.5 PG (ref 26–35)
MCH RBC QN AUTO: 25.6 PG (ref 26–35)
MCHC RBC AUTO-ENTMCNC: 30.6 % (ref 32–34.5)
MCHC RBC AUTO-ENTMCNC: 31.1 % (ref 32–34.5)
MCHC RBC AUTO-ENTMCNC: 31.3 % (ref 32–34.5)
MCHC RBC AUTO-ENTMCNC: 31.5 % (ref 32–34.5)
MCHC RBC AUTO-ENTMCNC: 31.7 % (ref 32–34.5)
MCV RBC AUTO: 80.5 FL (ref 80–99.9)
MCV RBC AUTO: 81.1 FL (ref 80–99.9)
MCV RBC AUTO: 81.6 FL (ref 80–99.9)
MCV RBC AUTO: 82.2 FL (ref 80–99.9)
MCV RBC AUTO: 82.7 FL (ref 80–99.9)
METER GLUCOSE: 113 MG/DL (ref 74–99)
METER GLUCOSE: 131 MG/DL (ref 74–99)
METER GLUCOSE: 146 MG/DL (ref 74–99)
METER GLUCOSE: 153 MG/DL (ref 74–99)
METER GLUCOSE: 177 MG/DL (ref 74–99)
METER GLUCOSE: 186 MG/DL (ref 74–99)
METER GLUCOSE: 210 MG/DL (ref 74–99)
METER GLUCOSE: 232 MG/DL (ref 74–99)
METER GLUCOSE: 244 MG/DL (ref 74–99)
METER GLUCOSE: 260 MG/DL (ref 74–99)
METER GLUCOSE: 70 MG/DL (ref 74–99)
METER GLUCOSE: 84 MG/DL (ref 74–99)
METHB: 0 % (ref 0–1.5)
METHB: 0.3 % (ref 0–1.5)
METHB: 0.3 % (ref 0–1.5)
MODE: ABNORMAL
MONOCYTES ABSOLUTE: 0.22 E9/L (ref 0.1–0.95)
MONOCYTES ABSOLUTE: 0.5 E9/L (ref 0.1–0.95)
MONOCYTES ABSOLUTE: 0.63 E9/L (ref 0.1–0.95)
MONOCYTES ABSOLUTE: 0.66 E9/L (ref 0.1–0.95)
MONOCYTES ABSOLUTE: 1.23 E9/L (ref 0.1–0.95)
MONOCYTES RELATIVE PERCENT: 11.3 % (ref 2–12)
MONOCYTES RELATIVE PERCENT: 3.7 % (ref 2–12)
MONOCYTES RELATIVE PERCENT: 4.1 % (ref 2–12)
MONOCYTES RELATIVE PERCENT: 5.2 % (ref 2–12)
MONOCYTES RELATIVE PERCENT: 6.9 % (ref 2–12)
MYCOPLASMA PNEUMONIAE BY PCR: NOT DETECTED
NEUTROPHILS ABSOLUTE: 13.98 E9/L (ref 1.8–7.3)
NEUTROPHILS ABSOLUTE: 15.22 E9/L (ref 1.8–7.3)
NEUTROPHILS ABSOLUTE: 5.22 E9/L (ref 1.8–7.3)
NEUTROPHILS ABSOLUTE: 5.41 E9/L (ref 1.8–7.3)
NEUTROPHILS ABSOLUTE: 9.4 E9/L (ref 1.8–7.3)
NEUTROPHILS RELATIVE PERCENT: 85.9 % (ref 43–80)
NEUTROPHILS RELATIVE PERCENT: 87 % (ref 43–80)
NEUTROPHILS RELATIVE PERCENT: 90 % (ref 43–80)
NEUTROPHILS RELATIVE PERCENT: 91 % (ref 43–80)
NEUTROPHILS RELATIVE PERCENT: 93.9 % (ref 43–80)
NUCLEATED RED BLOOD CELLS: 0.9 /100 WBC
O2 CONTENT: 11.8 ML/DL
O2 CONTENT: 12.5 ML/DL
O2 CONTENT: 13.6 ML/DL
O2 SATURATION: 84.3 % (ref 92–98.5)
O2 SATURATION: 88.1 % (ref 92–98.5)
O2 SATURATION: 89.8 % (ref 92–98.5)
O2HB: 83.9 % (ref 94–97)
O2HB: 87.6 % (ref 94–97)
O2HB: 88.9 % (ref 94–97)
OPERATOR ID: 405
OPERATOR ID: 914
OPERATOR ID: ABNORMAL
OVALOCYTES: ABNORMAL
PARAINFLUENZA VIRUS 1 BY PCR: NOT DETECTED
PARAINFLUENZA VIRUS 2 BY PCR: NOT DETECTED
PARAINFLUENZA VIRUS 3 BY PCR: NOT DETECTED
PARAINFLUENZA VIRUS 4 BY PCR: NOT DETECTED
PATIENT TEMP: 37 C
PCO2: 28 MMHG (ref 35–45)
PCO2: 29.6 MMHG (ref 35–45)
PCO2: 35.8 MMHG (ref 35–45)
PDW BLD-RTO: 18 FL (ref 11.5–15)
PDW BLD-RTO: 18.2 FL (ref 11.5–15)
PDW BLD-RTO: 18.3 FL (ref 11.5–15)
PFO2: 0.58 MMHG/%
PH BLOOD GAS: 7.37 (ref 7.35–7.45)
PH BLOOD GAS: 7.4 (ref 7.35–7.45)
PH BLOOD GAS: 7.49 (ref 7.35–7.45)
PLATELET # BLD: 236 E9/L (ref 130–450)
PLATELET # BLD: 240 E9/L (ref 130–450)
PLATELET # BLD: 253 E9/L (ref 130–450)
PLATELET # BLD: 288 E9/L (ref 130–450)
PLATELET # BLD: 294 E9/L (ref 130–450)
PMV BLD AUTO: 10.5 FL (ref 7–12)
PMV BLD AUTO: 11.1 FL (ref 7–12)
PMV BLD AUTO: 11.5 FL (ref 7–12)
PO2: 52.4 MMHG (ref 75–100)
PO2: 56 MMHG (ref 75–100)
PO2: 58.4 MMHG (ref 75–100)
POIKILOCYTES: ABNORMAL
POLYCHROMASIA: ABNORMAL
POTASSIUM REFLEX MAGNESIUM: 3.9 MMOL/L (ref 3.5–5)
POTASSIUM REFLEX MAGNESIUM: 4.1 MMOL/L (ref 3.5–5)
POTASSIUM REFLEX MAGNESIUM: 4.1 MMOL/L (ref 3.5–5)
POTASSIUM REFLEX MAGNESIUM: 4.3 MMOL/L (ref 3.5–5)
POTASSIUM SERPL-SCNC: 4.4 MMOL/L (ref 3.5–5)
PRO-BNP: 7752 PG/ML (ref 0–450)
PROCALCITONIN: 0.25 NG/ML (ref 0–0.08)
PROTHROMBIN TIME: 12 SEC (ref 9.3–12.4)
PROTHROMBIN TIME: 12.1 SEC (ref 9.3–12.4)
PROTHROMBIN TIME: 12.4 SEC (ref 9.3–12.4)
PROTHROMBIN TIME: 12.4 SEC (ref 9.3–12.4)
RBC # BLD: 3.29 E12/L (ref 3.5–5.5)
RBC # BLD: 3.48 E12/L (ref 3.5–5.5)
RBC # BLD: 3.8 E12/L (ref 3.5–5.5)
RBC # BLD: 3.96 E12/L (ref 3.5–5.5)
RBC # BLD: 3.99 E12/L (ref 3.5–5.5)
RESPIRATORY SYNCYTIAL VIRUS BY PCR: NOT DETECTED
RI(T): 1030 %
SARS-COV-2, PCR: DETECTED
SCHISTOCYTES: ABNORMAL
SODIUM BLD-SCNC: 131 MMOL/L (ref 132–146)
SODIUM BLD-SCNC: 134 MMOL/L (ref 132–146)
SODIUM BLD-SCNC: 134 MMOL/L (ref 132–146)
SODIUM BLD-SCNC: 135 MMOL/L (ref 132–146)
SODIUM BLD-SCNC: 136 MMOL/L (ref 132–146)
SOURCE, BLOOD GAS: ABNORMAL
TARGET CELLS: ABNORMAL
TEAR DROP CELLS: ABNORMAL
TEAR DROP CELLS: ABNORMAL
THB: 10 G/DL (ref 11.5–16.5)
THB: 10.1 G/DL (ref 11.5–16.5)
THB: 10.9 G/DL (ref 11.5–16.5)
TIME ANALYZED: 153
TIME ANALYZED: 1843
TIME ANALYZED: 2146
TOTAL PROTEIN: 5.4 G/DL (ref 6.4–8.3)
TOTAL PROTEIN: 5.6 G/DL (ref 6.4–8.3)
TOTAL PROTEIN: 6.3 G/DL (ref 6.4–8.3)
TOTAL PROTEIN: 6.5 G/DL (ref 6.4–8.3)
TROPONIN: 0.01 NG/ML (ref 0–0.03)
TROPONIN: <0.01 NG/ML (ref 0–0.03)
WBC # BLD: 10 E9/L (ref 4.5–11.5)
WBC # BLD: 15.5 E9/L (ref 4.5–11.5)
WBC # BLD: 17.7 E9/L (ref 4.5–11.5)
WBC # BLD: 6 E9/L (ref 4.5–11.5)
WBC # BLD: 6 E9/L (ref 4.5–11.5)

## 2021-01-01 PROCEDURE — 83605 ASSAY OF LACTIC ACID: CPT

## 2021-01-01 PROCEDURE — 86140 C-REACTIVE PROTEIN: CPT

## 2021-01-01 PROCEDURE — 71045 X-RAY EXAM CHEST 1 VIEW: CPT

## 2021-01-01 PROCEDURE — 6370000000 HC RX 637 (ALT 250 FOR IP): Performed by: INTERNAL MEDICINE

## 2021-01-01 PROCEDURE — 97530 THERAPEUTIC ACTIVITIES: CPT

## 2021-01-01 PROCEDURE — 80053 COMPREHEN METABOLIC PANEL: CPT

## 2021-01-01 PROCEDURE — 2580000003 HC RX 258: Performed by: INTERNAL MEDICINE

## 2021-01-01 PROCEDURE — 85384 FIBRINOGEN ACTIVITY: CPT

## 2021-01-01 PROCEDURE — 82805 BLOOD GASES W/O2 SATURATION: CPT

## 2021-01-01 PROCEDURE — 80048 BASIC METABOLIC PNL TOTAL CA: CPT

## 2021-01-01 PROCEDURE — 36415 COLL VENOUS BLD VENIPUNCTURE: CPT

## 2021-01-01 PROCEDURE — 85730 THROMBOPLASTIN TIME PARTIAL: CPT

## 2021-01-01 PROCEDURE — 85610 PROTHROMBIN TIME: CPT

## 2021-01-01 PROCEDURE — 84484 ASSAY OF TROPONIN QUANT: CPT

## 2021-01-01 PROCEDURE — 2140000000 HC CCU INTERMEDIATE R&B

## 2021-01-01 PROCEDURE — 0202U NFCT DS 22 TRGT SARS-COV-2: CPT

## 2021-01-01 PROCEDURE — 2500000003 HC RX 250 WO HCPCS: Performed by: FAMILY MEDICINE

## 2021-01-01 PROCEDURE — 2500000003 HC RX 250 WO HCPCS: Performed by: INTERNAL MEDICINE

## 2021-01-01 PROCEDURE — 85025 COMPLETE CBC W/AUTO DIFF WBC: CPT

## 2021-01-01 PROCEDURE — 83880 ASSAY OF NATRIURETIC PEPTIDE: CPT

## 2021-01-01 PROCEDURE — 82728 ASSAY OF FERRITIN: CPT

## 2021-01-01 PROCEDURE — 6360000004 HC RX CONTRAST MEDICATION: Performed by: RADIOLOGY

## 2021-01-01 PROCEDURE — 97535 SELF CARE MNGMENT TRAINING: CPT

## 2021-01-01 PROCEDURE — 70450 CT HEAD/BRAIN W/O DYE: CPT

## 2021-01-01 PROCEDURE — 83615 LACTATE (LD) (LDH) ENZYME: CPT

## 2021-01-01 PROCEDURE — 85378 FIBRIN DEGRADE SEMIQUANT: CPT

## 2021-01-01 PROCEDURE — 6360000002 HC RX W HCPCS: Performed by: FAMILY MEDICINE

## 2021-01-01 PROCEDURE — 93010 ELECTROCARDIOGRAM REPORT: CPT | Performed by: INTERNAL MEDICINE

## 2021-01-01 PROCEDURE — 84145 PROCALCITONIN (PCT): CPT

## 2021-01-01 PROCEDURE — 96375 TX/PRO/DX INJ NEW DRUG ADDON: CPT

## 2021-01-01 PROCEDURE — 6360000002 HC RX W HCPCS

## 2021-01-01 PROCEDURE — 94660 CPAP INITIATION&MGMT: CPT

## 2021-01-01 PROCEDURE — 82962 GLUCOSE BLOOD TEST: CPT

## 2021-01-01 PROCEDURE — 6360000002 HC RX W HCPCS: Performed by: INTERNAL MEDICINE

## 2021-01-01 PROCEDURE — 99223 1ST HOSP IP/OBS HIGH 75: CPT | Performed by: INTERNAL MEDICINE

## 2021-01-01 PROCEDURE — 97166 OT EVAL MOD COMPLEX 45 MIN: CPT

## 2021-01-01 PROCEDURE — 2580000003 HC RX 258

## 2021-01-01 PROCEDURE — 6360000002 HC RX W HCPCS: Performed by: EMERGENCY MEDICINE

## 2021-01-01 PROCEDURE — 87040 BLOOD CULTURE FOR BACTERIA: CPT

## 2021-01-01 PROCEDURE — 2700000000 HC OXYGEN THERAPY PER DAY

## 2021-01-01 PROCEDURE — 71275 CT ANGIOGRAPHY CHEST: CPT

## 2021-01-01 PROCEDURE — 96361 HYDRATE IV INFUSION ADD-ON: CPT

## 2021-01-01 PROCEDURE — 99284 EMERGENCY DEPT VISIT MOD MDM: CPT

## 2021-01-01 PROCEDURE — 2580000003 HC RX 258: Performed by: EMERGENCY MEDICINE

## 2021-01-01 PROCEDURE — 96374 THER/PROPH/DIAG INJ IV PUSH: CPT

## 2021-01-01 PROCEDURE — XW033E5 INTRODUCTION OF REMDESIVIR ANTI-INFECTIVE INTO PERIPHERAL VEIN, PERCUTANEOUS APPROACH, NEW TECHNOLOGY GROUP 5: ICD-10-PCS | Performed by: FAMILY MEDICINE

## 2021-01-01 PROCEDURE — 2580000003 HC RX 258: Performed by: FAMILY MEDICINE

## 2021-01-01 PROCEDURE — 93005 ELECTROCARDIOGRAM TRACING: CPT | Performed by: EMERGENCY MEDICINE

## 2021-01-01 RX ORDER — HYDROXYZINE PAMOATE 25 MG/1
25 CAPSULE ORAL EVERY 8 HOURS PRN
Status: DISCONTINUED | OUTPATIENT
Start: 2021-01-01 | End: 2021-01-01 | Stop reason: HOSPADM

## 2021-01-01 RX ORDER — DEXAMETHASONE SODIUM PHOSPHATE 4 MG/ML
6 INJECTION, SOLUTION INTRA-ARTICULAR; INTRALESIONAL; INTRAMUSCULAR; INTRAVENOUS; SOFT TISSUE DAILY
Status: DISCONTINUED | OUTPATIENT
Start: 2021-01-01 | End: 2021-01-01 | Stop reason: HOSPADM

## 2021-01-01 RX ORDER — DIPHENHYDRAMINE HCL 25 MG
25 TABLET ORAL ONCE
Status: COMPLETED | OUTPATIENT
Start: 2021-01-01 | End: 2021-01-01

## 2021-01-01 RX ORDER — METOCLOPRAMIDE 5 MG/1
5 TABLET ORAL
Status: DISCONTINUED | OUTPATIENT
Start: 2021-01-01 | End: 2021-01-01

## 2021-01-01 RX ORDER — CARVEDILOL 3.12 MG/1
3.12 TABLET ORAL 2 TIMES DAILY WITH MEALS
Status: DISCONTINUED | OUTPATIENT
Start: 2021-01-01 | End: 2021-01-01 | Stop reason: HOSPADM

## 2021-01-01 RX ORDER — ACETAMINOPHEN 325 MG/1
650 TABLET ORAL EVERY 6 HOURS PRN
Status: DISCONTINUED | OUTPATIENT
Start: 2021-01-01 | End: 2021-01-01 | Stop reason: HOSPADM

## 2021-01-01 RX ORDER — 0.9 % SODIUM CHLORIDE 0.9 %
1000 INTRAVENOUS SOLUTION INTRAVENOUS ONCE
Status: COMPLETED | OUTPATIENT
Start: 2021-01-01 | End: 2021-01-01

## 2021-01-01 RX ORDER — DIPHENHYDRAMINE HYDROCHLORIDE 50 MG/ML
25 INJECTION INTRAMUSCULAR; INTRAVENOUS ONCE
Status: DISCONTINUED | OUTPATIENT
Start: 2021-01-01 | End: 2021-01-01 | Stop reason: HOSPADM

## 2021-01-01 RX ORDER — SODIUM CHLORIDE 0.9 % (FLUSH) 0.9 %
10 SYRINGE (ML) INJECTION EVERY 12 HOURS SCHEDULED
Status: DISCONTINUED | OUTPATIENT
Start: 2021-01-01 | End: 2021-01-01 | Stop reason: HOSPADM

## 2021-01-01 RX ORDER — CLOPIDOGREL BISULFATE 75 MG/1
75 TABLET ORAL DAILY
Status: DISCONTINUED | OUTPATIENT
Start: 2021-01-01 | End: 2021-01-01 | Stop reason: HOSPADM

## 2021-01-01 RX ORDER — ALLOPURINOL 100 MG/1
100 TABLET ORAL 2 TIMES DAILY
Status: DISCONTINUED | OUTPATIENT
Start: 2021-01-01 | End: 2021-01-01 | Stop reason: HOSPADM

## 2021-01-01 RX ORDER — ONDANSETRON 2 MG/ML
4 INJECTION INTRAMUSCULAR; INTRAVENOUS EVERY 6 HOURS PRN
Status: DISCONTINUED | OUTPATIENT
Start: 2021-01-01 | End: 2021-01-01

## 2021-01-01 RX ORDER — ACETAMINOPHEN 650 MG/1
650 SUPPOSITORY RECTAL EVERY 6 HOURS PRN
Status: DISCONTINUED | OUTPATIENT
Start: 2021-01-01 | End: 2021-01-01 | Stop reason: HOSPADM

## 2021-01-01 RX ORDER — BUMETANIDE 0.25 MG/ML
1 INJECTION, SOLUTION INTRAMUSCULAR; INTRAVENOUS 2 TIMES DAILY
Status: DISCONTINUED | OUTPATIENT
Start: 2021-01-01 | End: 2021-01-01 | Stop reason: HOSPADM

## 2021-01-01 RX ORDER — DEXTROSE MONOHYDRATE 50 MG/ML
100 INJECTION, SOLUTION INTRAVENOUS PRN
Status: DISCONTINUED | OUTPATIENT
Start: 2021-01-01 | End: 2021-01-01 | Stop reason: HOSPADM

## 2021-01-01 RX ORDER — COLCHICINE 0.6 MG/1
0.6 TABLET ORAL DAILY PRN
Status: DISCONTINUED | OUTPATIENT
Start: 2021-01-01 | End: 2021-01-01 | Stop reason: HOSPADM

## 2021-01-01 RX ORDER — ACETAMINOPHEN 650 MG/1
650 SUPPOSITORY RECTAL EVERY 6 HOURS PRN
Status: DISCONTINUED | OUTPATIENT
Start: 2021-01-01 | End: 2021-01-01 | Stop reason: SDUPTHER

## 2021-01-01 RX ORDER — DIPHENHYDRAMINE HYDROCHLORIDE 50 MG/ML
25 INJECTION INTRAMUSCULAR; INTRAVENOUS ONCE
Status: COMPLETED | OUTPATIENT
Start: 2021-01-01 | End: 2021-01-01

## 2021-01-01 RX ORDER — BUMETANIDE 1 MG/1
1 TABLET ORAL DAILY
Status: DISCONTINUED | OUTPATIENT
Start: 2021-01-01 | End: 2021-01-01

## 2021-01-01 RX ORDER — DIPHENHYDRAMINE HYDROCHLORIDE 50 MG/ML
INJECTION INTRAMUSCULAR; INTRAVENOUS
Status: DISPENSED
Start: 2021-01-01 | End: 2021-01-01

## 2021-01-01 RX ORDER — ERGOCALCIFEROL 1.25 MG/1
50000 CAPSULE ORAL WEEKLY
Status: DISCONTINUED | OUTPATIENT
Start: 2021-01-13 | End: 2021-01-01 | Stop reason: HOSPADM

## 2021-01-01 RX ORDER — ONDANSETRON 4 MG/1
4 TABLET, ORALLY DISINTEGRATING ORAL EVERY 8 HOURS PRN
Status: DISCONTINUED | OUTPATIENT
Start: 2021-01-01 | End: 2021-01-01

## 2021-01-01 RX ORDER — QUETIAPINE FUMARATE 25 MG/1
25 TABLET, FILM COATED ORAL DAILY
Status: DISCONTINUED | OUTPATIENT
Start: 2021-01-01 | End: 2021-01-01 | Stop reason: HOSPADM

## 2021-01-01 RX ORDER — LORAZEPAM 2 MG/ML
0.5 INJECTION INTRAMUSCULAR EVERY 4 HOURS PRN
Status: DISCONTINUED | OUTPATIENT
Start: 2021-01-01 | End: 2021-01-01 | Stop reason: HOSPADM

## 2021-01-01 RX ORDER — ACETAMINOPHEN 325 MG/1
650 TABLET ORAL EVERY 6 HOURS PRN
Status: DISCONTINUED | OUTPATIENT
Start: 2021-01-01 | End: 2021-01-01 | Stop reason: SDUPTHER

## 2021-01-01 RX ORDER — FUROSEMIDE 10 MG/ML
20 INJECTION INTRAMUSCULAR; INTRAVENOUS ONCE
Status: DISCONTINUED | OUTPATIENT
Start: 2021-01-01 | End: 2021-01-01 | Stop reason: HOSPADM

## 2021-01-01 RX ORDER — POLYETHYLENE GLYCOL 3350 17 G/17G
17 POWDER, FOR SOLUTION ORAL DAILY PRN
Status: DISCONTINUED | OUTPATIENT
Start: 2021-01-01 | End: 2021-01-01 | Stop reason: HOSPADM

## 2021-01-01 RX ORDER — EPINEPHRINE 0.1 MG/ML
0.5 SYRINGE (ML) INJECTION PRN
Status: DISCONTINUED | OUTPATIENT
Start: 2021-01-01 | End: 2021-01-01 | Stop reason: HOSPADM

## 2021-01-01 RX ORDER — SODIUM CHLORIDE 0.9 % (FLUSH) 0.9 %
10 SYRINGE (ML) INJECTION PRN
Status: DISCONTINUED | OUTPATIENT
Start: 2021-01-01 | End: 2021-01-01 | Stop reason: HOSPADM

## 2021-01-01 RX ORDER — DEXTROSE MONOHYDRATE 25 G/50ML
12.5 INJECTION, SOLUTION INTRAVENOUS PRN
Status: DISCONTINUED | OUTPATIENT
Start: 2021-01-01 | End: 2021-01-01 | Stop reason: HOSPADM

## 2021-01-01 RX ORDER — DEXAMETHASONE 4 MG/1
6 TABLET ORAL DAILY
Status: DISCONTINUED | OUTPATIENT
Start: 2021-01-01 | End: 2021-01-01

## 2021-01-01 RX ORDER — 0.9 % SODIUM CHLORIDE 0.9 %
30 INTRAVENOUS SOLUTION INTRAVENOUS PRN
Status: DISCONTINUED | OUTPATIENT
Start: 2021-01-01 | End: 2021-01-01 | Stop reason: HOSPADM

## 2021-01-01 RX ORDER — LEVOFLOXACIN 5 MG/ML
250 INJECTION, SOLUTION INTRAVENOUS EVERY 24 HOURS
Status: DISCONTINUED | OUTPATIENT
Start: 2021-01-01 | End: 2021-01-01

## 2021-01-01 RX ORDER — INSULIN GLARGINE 100 [IU]/ML
34 INJECTION, SOLUTION SUBCUTANEOUS DAILY
Status: DISCONTINUED | OUTPATIENT
Start: 2021-01-01 | End: 2021-01-01 | Stop reason: HOSPADM

## 2021-01-01 RX ORDER — DEXAMETHASONE SODIUM PHOSPHATE 10 MG/ML
6 INJECTION INTRAMUSCULAR; INTRAVENOUS ONCE
Status: COMPLETED | OUTPATIENT
Start: 2021-01-01 | End: 2021-01-01

## 2021-01-01 RX ORDER — FUROSEMIDE 10 MG/ML
INJECTION INTRAMUSCULAR; INTRAVENOUS
Status: DISPENSED
Start: 2021-01-01 | End: 2021-01-01

## 2021-01-01 RX ORDER — LEVOFLOXACIN 5 MG/ML
500 INJECTION, SOLUTION INTRAVENOUS ONCE
Status: COMPLETED | OUTPATIENT
Start: 2021-01-01 | End: 2021-01-01

## 2021-01-01 RX ORDER — ACETAMINOPHEN 325 MG/1
325 TABLET ORAL ONCE
Status: COMPLETED | OUTPATIENT
Start: 2021-01-01 | End: 2021-01-01

## 2021-01-01 RX ORDER — NICOTINE POLACRILEX 4 MG
15 LOZENGE BUCCAL PRN
Status: DISCONTINUED | OUTPATIENT
Start: 2021-01-01 | End: 2021-01-01 | Stop reason: HOSPADM

## 2021-01-01 RX ORDER — SODIUM CHLORIDE 9 MG/ML
1000 INJECTION, SOLUTION INTRAVENOUS CONTINUOUS
Status: DISCONTINUED | OUTPATIENT
Start: 2021-01-01 | End: 2021-01-01

## 2021-01-01 RX ADMIN — ACETAMINOPHEN 325 MG: 325 TABLET ORAL at 17:08

## 2021-01-01 RX ADMIN — DEXAMETHASONE SODIUM PHOSPHATE 6 MG: 10 INJECTION INTRAMUSCULAR; INTRAVENOUS at 01:58

## 2021-01-01 RX ADMIN — CARVEDILOL 3.12 MG: 3.12 TABLET, FILM COATED ORAL at 10:11

## 2021-01-01 RX ADMIN — BUMETANIDE 1 MG: 1 TABLET ORAL at 18:16

## 2021-01-01 RX ADMIN — QUETIAPINE FUMARATE 25 MG: 25 TABLET ORAL at 10:11

## 2021-01-01 RX ADMIN — LORAZEPAM 0.5 MG: 2 INJECTION INTRAMUSCULAR; INTRAVENOUS at 00:12

## 2021-01-01 RX ADMIN — SODIUM CHLORIDE 1000 ML: 9 INJECTION, SOLUTION INTRAVENOUS at 16:00

## 2021-01-01 RX ADMIN — LEVOFLOXACIN 500 MG: 5 INJECTION, SOLUTION INTRAVENOUS at 11:53

## 2021-01-01 RX ADMIN — CARVEDILOL 3.12 MG: 3.12 TABLET, FILM COATED ORAL at 08:38

## 2021-01-01 RX ADMIN — DIPHENHYDRAMINE HYDROCHLORIDE 25 MG: 50 INJECTION, SOLUTION INTRAMUSCULAR; INTRAVENOUS at 02:32

## 2021-01-01 RX ADMIN — CEFTRIAXONE SODIUM 1 G: 1 INJECTION, POWDER, FOR SOLUTION INTRAMUSCULAR; INTRAVENOUS at 10:34

## 2021-01-01 RX ADMIN — TOCILIZUMAB 650 MG: 20 INJECTION, SOLUTION, CONCENTRATE INTRAVENOUS at 17:54

## 2021-01-01 RX ADMIN — CLOPIDOGREL BISULFATE 75 MG: 75 TABLET ORAL at 18:17

## 2021-01-01 RX ADMIN — QUETIAPINE FUMARATE 25 MG: 25 TABLET ORAL at 08:38

## 2021-01-01 RX ADMIN — SACUBITRIL AND VALSARTAN 1 TABLET: 24; 26 TABLET, FILM COATED ORAL at 22:19

## 2021-01-01 RX ADMIN — Medication 10 ML: at 21:35

## 2021-01-01 RX ADMIN — BUMETANIDE 1 MG: 1 TABLET ORAL at 08:38

## 2021-01-01 RX ADMIN — INSULIN LISPRO 2 UNITS: 100 INJECTION, SOLUTION INTRAVENOUS; SUBCUTANEOUS at 07:59

## 2021-01-01 RX ADMIN — SACUBITRIL AND VALSARTAN 1 TABLET: 24; 26 TABLET, FILM COATED ORAL at 21:37

## 2021-01-01 RX ADMIN — CLOPIDOGREL BISULFATE 75 MG: 75 TABLET ORAL at 08:39

## 2021-01-01 RX ADMIN — DIPHENHYDRAMINE HYDROCHLORIDE 25 MG: 25 TABLET ORAL at 17:08

## 2021-01-01 RX ADMIN — SACUBITRIL AND VALSARTAN 1 TABLET: 24; 26 TABLET, FILM COATED ORAL at 08:39

## 2021-01-01 RX ADMIN — CARVEDILOL 3.12 MG: 3.12 TABLET, FILM COATED ORAL at 16:01

## 2021-01-01 RX ADMIN — BUMETANIDE 1 MG: 0.25 INJECTION, SOLUTION INTRAMUSCULAR; INTRAVENOUS at 23:56

## 2021-01-01 RX ADMIN — ENOXAPARIN SODIUM 30 MG: 30 INJECTION SUBCUTANEOUS at 22:19

## 2021-01-01 RX ADMIN — IOPAMIDOL 60 ML: 755 INJECTION, SOLUTION INTRAVENOUS at 05:13

## 2021-01-01 RX ADMIN — INSULIN LISPRO 4 UNITS: 100 INJECTION, SOLUTION INTRAVENOUS; SUBCUTANEOUS at 17:43

## 2021-01-01 RX ADMIN — Medication 10 ML: at 22:19

## 2021-01-01 RX ADMIN — INSULIN GLARGINE 34 UNITS: 100 INJECTION, SOLUTION SUBCUTANEOUS at 08:52

## 2021-01-01 RX ADMIN — Medication 10 ML: at 08:38

## 2021-01-01 RX ADMIN — Medication 10 ML: at 21:37

## 2021-01-01 RX ADMIN — BUMETANIDE 1 MG: 1 TABLET ORAL at 10:11

## 2021-01-01 RX ADMIN — DEXAMETHASONE SODIUM PHOSPHATE 6 MG: 4 INJECTION, SOLUTION INTRAMUSCULAR; INTRAVENOUS at 08:39

## 2021-01-01 RX ADMIN — ENOXAPARIN SODIUM 30 MG: 30 INJECTION SUBCUTANEOUS at 10:34

## 2021-01-01 RX ADMIN — SACUBITRIL AND VALSARTAN 1 TABLET: 24; 26 TABLET, FILM COATED ORAL at 10:11

## 2021-01-01 RX ADMIN — ENOXAPARIN SODIUM 30 MG: 30 INJECTION SUBCUTANEOUS at 21:37

## 2021-01-01 RX ADMIN — CLOPIDOGREL BISULFATE 75 MG: 75 TABLET ORAL at 10:11

## 2021-01-01 RX ADMIN — ENOXAPARIN SODIUM 30 MG: 30 INJECTION SUBCUTANEOUS at 21:36

## 2021-01-01 RX ADMIN — REMDESIVIR 100 MG: 100 INJECTION, POWDER, LYOPHILIZED, FOR SOLUTION INTRAVENOUS at 17:09

## 2021-01-01 RX ADMIN — SODIUM CHLORIDE, PRESERVATIVE FREE 10 ML: 5 INJECTION INTRAVENOUS at 23:31

## 2021-01-01 RX ADMIN — Medication 10 ML: at 10:35

## 2021-01-01 RX ADMIN — ALLOPURINOL 100 MG: 100 TABLET ORAL at 21:38

## 2021-01-01 RX ADMIN — QUETIAPINE FUMARATE 25 MG: 25 TABLET ORAL at 18:16

## 2021-01-01 RX ADMIN — INSULIN LISPRO 4 UNITS: 100 INJECTION, SOLUTION INTRAVENOUS; SUBCUTANEOUS at 18:03

## 2021-01-01 RX ADMIN — AZITHROMYCIN DIHYDRATE 500 MG: 500 INJECTION, POWDER, LYOPHILIZED, FOR SOLUTION INTRAVENOUS at 12:05

## 2021-01-01 RX ADMIN — INSULIN GLARGINE 34 UNITS: 100 INJECTION, SOLUTION SUBCUTANEOUS at 18:03

## 2021-01-01 RX ADMIN — REMDESIVIR 200 MG: 100 INJECTION, POWDER, LYOPHILIZED, FOR SOLUTION INTRAVENOUS at 08:11

## 2021-01-01 RX ADMIN — INSULIN LISPRO 4 UNITS: 100 INJECTION, SOLUTION INTRAVENOUS; SUBCUTANEOUS at 13:28

## 2021-01-01 RX ADMIN — Medication 10 ML: at 10:12

## 2021-01-01 RX ADMIN — DEXAMETHASONE 6 MG: 4 TABLET ORAL at 10:34

## 2021-01-01 RX ADMIN — CARVEDILOL 3.12 MG: 3.12 TABLET, FILM COATED ORAL at 18:16

## 2021-01-01 RX ADMIN — LEVOFLOXACIN 250 MG: 250 INJECTION, SOLUTION INTRAVENOUS at 23:31

## 2021-01-01 RX ADMIN — SODIUM CHLORIDE, PRESERVATIVE FREE 10 ML: 5 INJECTION INTRAVENOUS at 23:56

## 2021-01-01 RX ADMIN — ENOXAPARIN SODIUM 30 MG: 30 INJECTION SUBCUTANEOUS at 10:11

## 2021-01-01 RX ADMIN — CARVEDILOL 3.12 MG: 3.12 TABLET, FILM COATED ORAL at 17:54

## 2021-01-01 RX ADMIN — INSULIN LISPRO 2 UNITS: 100 INJECTION, SOLUTION INTRAVENOUS; SUBCUTANEOUS at 12:06

## 2021-01-01 RX ADMIN — ENOXAPARIN SODIUM 30 MG: 30 INJECTION SUBCUTANEOUS at 08:38

## 2021-01-01 RX ADMIN — ALLOPURINOL 100 MG: 100 TABLET ORAL at 21:36

## 2021-01-01 RX ADMIN — SACUBITRIL AND VALSARTAN 1 TABLET: 24; 26 TABLET, FILM COATED ORAL at 21:36

## 2021-01-01 RX ADMIN — ALLOPURINOL 100 MG: 100 TABLET ORAL at 22:19

## 2021-01-01 RX ADMIN — SODIUM CHLORIDE 1000 ML: 9 INJECTION, SOLUTION INTRAVENOUS at 02:47

## 2021-01-01 RX ADMIN — ALLOPURINOL 100 MG: 100 TABLET ORAL at 08:38

## 2021-01-01 RX ADMIN — CEFTRIAXONE SODIUM 1 G: 1 INJECTION, POWDER, FOR SOLUTION INTRAMUSCULAR; INTRAVENOUS at 23:55

## 2021-01-01 RX ADMIN — ALLOPURINOL 100 MG: 100 TABLET ORAL at 10:10

## 2021-01-01 RX ADMIN — REMDESIVIR 100 MG: 100 INJECTION, POWDER, LYOPHILIZED, FOR SOLUTION INTRAVENOUS at 17:54

## 2021-01-01 RX ADMIN — SODIUM CHLORIDE 1000 ML: 9 INJECTION, SOLUTION INTRAVENOUS at 08:11

## 2021-01-01 RX ADMIN — SODIUM CHLORIDE 1000 ML: 9 INJECTION, SOLUTION INTRAVENOUS at 01:58

## 2021-01-01 RX ADMIN — DEXAMETHASONE SODIUM PHOSPHATE 6 MG: 4 INJECTION, SOLUTION INTRAMUSCULAR; INTRAVENOUS at 10:11

## 2021-01-01 RX ADMIN — INSULIN GLARGINE 34 UNITS: 100 INJECTION, SOLUTION SUBCUTANEOUS at 10:30

## 2021-01-01 ASSESSMENT — PAIN SCALES - GENERAL
PAINLEVEL_OUTOF10: 0
PAINLEVEL_OUTOF10: 0

## 2021-01-09 PROBLEM — J12.82 PNEUMONIA DUE TO COVID-19 VIRUS: Status: ACTIVE | Noted: 2021-01-01

## 2021-01-09 PROBLEM — U07.1 PNEUMONIA DUE TO COVID-19 VIRUS: Status: ACTIVE | Noted: 2021-01-01

## 2021-01-09 PROBLEM — U07.1 COVID-19: Status: ACTIVE | Noted: 2021-01-01

## 2021-01-09 PROBLEM — G93.41 ACUTE METABOLIC ENCEPHALOPATHY: Status: ACTIVE | Noted: 2021-01-01

## 2021-01-09 NOTE — ED NOTES
Bed: 15  Expected date:   Expected time:   Means of arrival:   Comments:  ems     Ignacio Wild RN  01/09/21 [id number]

## 2021-01-09 NOTE — ACP (ADVANCE CARE PLANNING)
Advance Care Planning     Advance Care Planning Activator (Inpatient)  Conversation Note      Date of ACP Conversation: 1/9/2021    Conversation Conducted with:  Healthcare Decision Maker: Named in Advance Directive or Healthcare Power of  (name) Yesi Erwin    ACP Activator: Den Bronson    *When Decision Maker makes decisions on behalf of the incapacitated patient: Decision Maker is asked to consider and make decisions based on patient values, known preferences, or best interests. Health Care Decision Maker:     Current Designated Health Care Decision Maker:   (If there is a valid [place] named in the [address] [employer]\" box in the ACP activity, but it is not visible above, be sure to open that field and then select the health care decision maker relationship (ie \"primary\") in the blank space to the right of the name.) Validate  this information as still accurate & up-to-date; edit [place] field as needed.)    Note: Assess and validate information in current ACP documents, as indicated. If no Decision Maker listed above or available through scanned documents, then:    If no Authorized Decision Maker has previously been identified, then patient chooses [place]:  \"Who would you like to name as your primary health care decision-maker? \"               Name: Yesi Erwin        Relationship: Daughter          Phone number: [phone]  John Newton this person be reached easily? \" Yes    Note: If the relationship of these Decision-Makers to the patient does NOT follow your state's Next of Kin hierarchy, recommend that patient complete ACP document that meets state-specific requirements to allow them to act on the patient's behalf when appropriate. Care Preferences    Ventilation:   \"If you were in your present state of health and suddenly became very ill and were unable to breathe on your own, what would your preference be about the use of a ventilator (breathing machine) if it were available to you? \"      Would the patient desire the use of ventilator (breathing machine)?: no    \"If your health worsens and it becomes clear that your chance of recovery is unlikely, what would your preference be about the use of a ventilator (breathing machine) if it were available to you? \"     Would the patient desire the use of ventilator (breathing machine)?: No      Resuscitation  \"CPR works best to restart the heart when there is a sudden event, like a heart attack, in someone who is otherwise healthy. Unfortunately, CPR does not typically restart the heart for people who have serious health conditions or who are very sick. \"    \"In the event your heart stopped as a result of an underlying serious health condition, would you want attempts to be made to restart your heart (answer \"yes\" for attempt to resuscitate) or would you prefer a natural death (answer \"no\" for do not attempt to resuscitate)? \" no      NOTE: If the patient has a valid advance directive AND now provides care preference(s) that are inconsistent with that prior directive, advise the patient to consider either: creating a new advance directive that complies with state-specific requirements; or, if that is not possible, orally revoking that prior directive in accordance with state-specific requirements, which must be documented in the EHR. [x] Yes   [] No   Educated Patient / Amaryllis Prey regarding differences between Advance Directives and portable DNR orders.     Length of ACP Conversation in minutes:      Conversation Outcomes:  [x] ACP discussion completed  [] Existing advance directive reviewed with patient; no changes to patient's previously recorded wishes  [] New Advance Directive completed  [] Portable Do Not Rescitate prepared for Provider review and signature  [] POLST/POST/MOLST/MOST prepared for Provider review and signature      Follow-up plan:    [x] [place] follow-up conversation

## 2021-01-09 NOTE — ED NOTES
Radiology Procedure Waiver   Name: Nelson Truong  : 1927  MRN: [id number]    Date:  21    Time: 2:20 AM EST    Benefits of immediately proceeding with Radiology exam(s) without pre-testing outweigh the risks or are not indicated as specified below and therefore the following is/are being waived:    [] Pregnancy test   [] Patients LMP on-time and regular.   [] Patient had Tubal Ligation or has other Contraception Device. [] Patient  is Menopausal or Premenarcheal.    [] Patient had Full or Partial Hysterectomy. [x] Protocol for Iodine allergy    [] MRI Questionnaire     [] BUN/Creatinine   [] Patient age w/no hx of renal dysfunction. [] Patient on Dialysis. [] Recent Normal Labs.   Electronically signed by Theo Macedo DO on 21 at 2:20 AM EST          Pt pretreated, pretreated In  similarily     Theo Macedo DO  [phone]

## 2021-01-09 NOTE — Clinical Note
Patient Class: Inpatient [101]   REQUIRED: Diagnosis: Pneumonia due to COVID-19 virus [[id number]]   Estimated Length of Stay: Estimated stay of more than 2 midnights   Telemetry Bed Required?: Yes

## 2021-01-09 NOTE — H&P
Hospitalist History & Physical      PCP: Belle Faulkner MD    Date of Admission: 1/9/2021    Date of Service: Pt seen/examined on 1/9/2021 and is admitted to Inpatient with expected LOS greater than two midnights due to medical therapy. Chief Complaint:  had concerns including Shortness of Breath (family sent her in for sob. Per ems patient is normally A+Ox 4 but is normally A+Ox2. COVID +. ). History Of Present Illness:    Ms. Kellie Mart, a 80y.o. year old female  who  has a past medical history of Arthritis, CHF (congestive heart failure) ([address] CKD (chronic kidney disease), Diabetic eye exam ([facility] Hyperlipidemia, Hypertension, Iron deficiency anemia, Type II or unspecified type diabetes mellitus without mention of complication, not stated as uncontrolled, Ulcer of foot ([address] and Valvular heart disease. This is a 20-year-old female with a history of osteoarthritis, heart failure with preserved ejection fraction, diabetes mellitus type 2, CKD, hypertension, hyperlipidemia, presenting with shortness of breath for 1 day. She is also noted with some increasing confusion. She was brought to the emergency department. In the emergency department, she was tested for COVID-19, which was positive. She was noted to be tachypneic. She was initially placed on BiPAP. She did not tolerate BiPAP and the BiPAP was discontinued. Her breathing did improve. Upon evaluation, she denies shortness of breath at this time. No chest pain, chest pressure, chest tightness. She says that it is currently December 2020. She knows that she is at [facility].    I did speak with her daughter, John Young. The patient was tested 1 week prior and was positive for COVID-19. She was being treated with dexamethasone 8 mg daily and cefuroxime.     Past Medical History:        Diagnosis Date    Arthritis     CHF (congestive heart failure) (HCC)     CKD (chronic kidney disease)     [facility] ([email or web address] 05/21/2015    Dr Mary Mclain diabetic retinopathy    Hyperlipidemia     Hypertension     Iron deficiency anemia     Type II or unspecified type diabetes mellitus without mention of complication, not stated as uncontrolled     Ulcer of foot ([email or web address] 6/18/2013    Valvular heart disease        Past Surgical History:        Procedure Laterality Date    ADENOIDECTOMY      APPENDECTOMY      CARPAL TUNNEL RELEASE      CATARACT REMOVAL      CATARACT REMOVAL      CHOLECYSTECTOMY      DOPPLER ECHOCARDIOGRAPHY  06/07/2018    Dr Olena Macedo mitral regurg-mild tricuspid regurg-trace pulmonic regurg-trace to mild aortic regurg    ENDOSCOPY, COLON, DIAGNOSTIC      JOINT REPLACEMENT      BILAT KNEE    ROTATOR CUFF REPAIR      TONSILLECTOMY      TOTAL KNEE ARTHROPLASTY      TRANSESOPHAGEAL ECHOCARDIOGRAM  06/19/2019    DR Maldonado    UPPER GASTROINTESTINAL ENDOSCOPY  05/28/2015    UPPER GASTROINTESTINAL ENDOSCOPY  11/07/2017    DR JEFF SULLIVAN     KIDNEY DUPLEX BILAT  09/17/2016    Sandeep  Imaging-normal    VASCULAR SURGERY  4/18/2013    Right leg angiogram by DR Francis       Medications Prior to Admission:      Prior to Admission medications    Medication Sig Start Date End Date Taking?  Authorizing Provider   QUEtiapine (SEROQUEL) 25 MG tablet Take 1 tablet by mouth 2 times daily  Patient taking differently: Take 25 mg by mouth daily  12/14/20  Yes Fabi Whitten MD   insulin lispro (HUMALOG) 100 UNIT/ML injection vial Inject 0-12 Units into the skin 3 times daily (with meals) 11/27/20  Yes Ignacio Be DO   Insulin Degludec (TRESIBA SC) Inject 34 Units into the skin 2 times daily Once in the AM before breakfast and once at night   Yes Historical Provider, MD   clopidogrel (PLAVIX) 75 MG tablet TAKE ONE TABLET BY MOUTH DAILY 11/23/20  Yes Fabi Whitten MD   Continuous Blood Gluc Sensor (FREESTYLE SHON [facility]) MISC E11.8 11/3/20  Yes Fabi Whitten MD   Continuous Blood Gluc  (FREESTYLE SHON 14 DAY READER) TANA e11.8 11/3/20  Yes Diane Luong MD   blood glucose test strips (ASCENSIA AUTODISC VI;ONE TOUCH ULTRA TEST VI) strip 1 each by In Vitro route 4 times daily Use to test blood sugar 2 times daily. E11.8 10/26/20  Yes Diane Luong MD   colchicine (COLCRYS) 0.6 MG tablet Take 0.6 mg by mouth daily as needed (gout attack)    Yes Historical Provider, MD   vitamin D (ERGOCALCIFEROL) 1.25 MG (28130 UT) CAPS capsule Take 50,000 Units by mouth once a week Given Wednesday   Yes Historical Provider, MD   carvedilol (COREG) 3.125 MG tablet Take 3.125 mg by mouth 2 times daily (with meals)    Yes Historical Provider, MD   bumetanide (BUMEX) 1 MG tablet Take 1 tablet by mouth daily 10/18/19  Yes Historical Provider, MD   sacubitril-valsartan (ENTRESTO) 24-26 MG per tablet Take 1 tablet by mouth 2 times daily    Yes Historical Provider, MD   allopurinol (ZYLOPRIM) 100 MG tablet Take 100 mg by mouth 2 times daily  12/27/17  Yes Historical Provider, MD   predniSONE (DELTASONE) 2.5 MG tablet 2.5 mg oral daily on Monday Wednesday and Friday 11/27/20   Ignacio Be,    metoclopramide (REGLAN) 5 MG tablet Take 5 mg by mouth 3 times daily (with meals)    Historical Provider, MD   hydrOXYzine (ATARAX) 25 MG tablet Take 1 tablet by mouth every 8 hours as needed for Itching 11/23/20   Diane Luong MD       Allergies:  Iodine and Iv dye [iodides]    Social History:    TOBACCO:   reports that she has never smoked. She has never used smokeless tobacco.  ETOH:   reports current alcohol use. Family History:    Reviewed in detail and negative for DM, CAD, Cancer, CVA. Positive as follows\"  History reviewed. No pertinent family history. REVIEW OF SYSTEMS:   Pertinent positives as noted in the HPI. All other systems reviewed and negative.     PHYSICAL EXAM:  /70   Pulse 90   Temp 98 °F (36.7 °C) (Oral)   Resp 20   Ht 5' 4\" (1.626 m)   Wt 178 lb (80.7 kg)   SpO2 96%   BMI 30.55 kg/m²   General appearance: Resting comfortably. Mild respiratory distress. Appears stated age and cooperative. HEENT: Normocephalic, atraumatic without obvious deformity. Pupils equal, round, and reactive to light. Extra ocular muscles intact. Conjunctivae/corneas clear. Neck: Supple, with full range of motion. No jugular venous distention. Trachea midline. Respiratory: Diminished, bilaterally. Equal and symmetric chest excursion with inspiration. Cardiovascular: Regular rate and rhythm. No murmurs, rubs, gallops. +S1, S2.  Abdomen: Soft, nondistended, nontender. Positive bowel sounds in 4 quadrants of the abdomen. Musculoskeletal: No clubbing, cyanosis. Brisk capillary refill. Trace lower extremity pitting edema bilaterally. Skin: Normal skin color. No rashes or lesions. Neurologic:  Neurovascularly intact without any focal sensory/motor deficits. Cranial nerves: II-XII intact, grossly non-focal.  Psychiatric: Alert and oriented, thought content appropriate, normal insight. Reviewed EKG and CXR personally    CBC:   Recent Labs     01/09/21 0133 01/09/21  0533   WBC 17.7* 15.5*   RBC 3.96 3.48*   HGB 10.1* 8.9*   HCT 32.3* 28.6*   MCV 81.6 82.2   RDW 18.2* 18.3*    240     BMP:   Recent Labs     01/09/21 0133 01/09/21  0533   * 134   K 4.1 3.9   CL 95* 104   CO2 22 21*   BUN 46* 40*   CREATININE 1.3* 1.2*     LFT:  Recent Labs     01/09/21 0133 01/09/21  0533   PROT 6.3* 5.4*   ALKPHOS 92 76   ALT 35* 26   AST 49* 44*   BILITOT 0.5 0.5     CE:  Recent Labs     01/09/21 0133   TROPONINI 0.01     PT/INR:   Recent Labs     01/09/21 0133 01/09/21  0533   INR 1.1 1.1   APTT 23.5* 22.5*     BNP: No results for input(s): BNP in the last 72 hours.   ESR:   Lab Results   Component Value Date    SEDRATE 40 (H) 09/15/2016     CRP:   Lab Results   Component Value Date    CRP 7.2 (H) 01/09/2021     D Dimer:   Lab Results   Component Value Date    DDIMER 389 01/09/2021      Folate and B12: Lab Results   Component Value Date    XVPVNFNW39 5190 (H) 11/25/2020   ,   Lab Results   Component Value Date    FOLATE 13.2 11/25/2020     Lactic Acid:   Lab Results   Component Value Date    LACTA 0.7 01/09/2021     Thyroid Studies:   Lab Results   Component Value Date    TSH 4.09 08/13/2019       Oupatient labs:  Lab Results   Component Value Date    CHOL 205 (H) 11/23/2018    TRIG 219 (H) 11/23/2018    HDL 43 11/23/2018    LDLCALC 118 (H) 11/23/2018    TSH 4.09 08/13/2019    INR 1.1 01/09/2021    LABA1C 7.9 (H) 11/26/2020       Urinalysis:    Lab Results   Component Value Date    NITRU Negative 11/24/2020    BLOODU Negative 11/24/2020    SPECGRAV 1.010 11/24/2020    GLUCOSEU Negative 11/24/2020       Imaging:  Ct Head Wo Contrast    Result Date: 1/9/2021  EXAMINATION: CT OF THE HEAD WITHOUT CONTRAST  1/9/2021 4:14 am TECHNIQUE: CT of the head was performed without the administration of intravenous contrast. Dose modulation, iterative reconstruction, and/or weight based adjustment of the mA/kV was utilized to reduce the radiation dose to as low as reasonably achievable. COMPARISON: 24 November 2020 HISTORY: ORDERING SYSTEM PROVIDED HISTORY: ams TECHNOLOGIST PROVIDED HISTORY: Reason for exam:->ams Has a \"code stroke\" or \"stroke alert\" been called? ->No What reading provider will be dictating this exam?->CRC FINDINGS: No hemorrhage, mass or midline shift. 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 Stable atrophy and likely chronic microvascular ischemic disease. Unremarkable bony calvarium. Multiple lacunar infarcts as before. Atrophy and likely chronic microvascular ischemic disease. Xr Chest Portable    Result Date: 1/9/2021  EXAMINATION: ONE XRAY VIEW OF THE CHEST 1/9/2021 1:08 am COMPARISON: November 24, 2020.  HISTORY: ORDERING SYSTEM PROVIDED HISTORY: sob TECHNOLOGIST PROVIDED HISTORY: Reason for exam:->sob What reading provider will be dictating this exam?->CRC FINDINGS: Cardiomegaly again noted with retrocardiac lucency. New focal density in the left mid lung and patchy densities in the right upper lobe and right lower lobes. No pneumothorax or large pleural effusion. 1.  New bilateral parenchymal opacities suspicious for multifocal pneumonia in the appropriate clinical setting. 2.  Suspected hiatal hernia. Cta Pulmonary W Contrast    Result Date: 1/9/2021  EXAMINATION: CTA OF THE CHEST 1/9/2021 4:14 am TECHNIQUE: CTA of the chest was performed after the administration of intravenous contrast.  Multiplanar reformatted images are provided for review. MIP images are provided for review. Dose modulation, iterative reconstruction, and/or weight based adjustment of the mA/kV was utilized to reduce the radiation dose to as low as reasonably achievable. COMPARISON: Chest radiograph January 9, 2021. HISTORY: ORDERING SYSTEM PROVIDED HISTORY: sob, hypoxia TECHNOLOGIST PROVIDED HISTORY: Reason for exam:->sob, hypoxia What reading provider will be dictating this exam?->CRC FINDINGS: Pulmonary Arteries: Limited evaluation of the pulmonary arteries due to contrast timing bolus, however no obvious large filling defect in the central vasculature to suggest central PE. Main pulmonary artery is normal in caliber. Mediastinum: No evidence of mediastinal lymphadenopathy. Atherosclerotic calcification of the coronary arteries. No pericardial effusion. There is no acute abnormality of the thoracic aorta. Lungs/pleura: Patchy left greater than right bilateral or less opacities. Left lower lobe atelectasis. No evidence of pleural effusion or pneumothorax. Upper Abdomen: Hiatal hernia present. Soft Tissues: Unremarkable. Bones: Multilevel degenerative changes. 1.  Limited evaluation of the pulmonary arteries due to contrast timing bolus, however no obvious large filling defect in the central pulmonary vasculature to suggest central PE.  2.  Commonly reported imaging features of (COVID-19 or viral) pneumonia are present. Other processes such as influenza pneumonia and organizing pneumonia, as can be seen with drug toxicity and connective tissue disease, can cause a similar imaging pattern. PneTyp      ASSESSMENT:    Principal Problem:    Pneumonia due to COVID-19 virus  Active Problems:    Chronic systolic congestive heart failure (HCC)    Essential hypertension    CKD (chronic kidney disease) stage 3, GFR 30-59 ml/min (HCC)    YRUET-11    Acute metabolic encephalopathy  Resolved Problems:    * No resolved hospital problems. *      PLAN:    Renal function appears at baseline  ID consult regarding COVID treatment, having worsened after outpatient therapy  Remdesivir for 4 doses  Dexamethasone 6mg IV daily  DVT prophylaxis  Trend labs  Monitor oxygen saturation and wean oxygen requirements as able    Patient is DNR-CCA          Diet: DIET LOW SODIUM 2 GM;  Code Status: DNR-CCA    Surrogate decision maker confirmed with patient:  Primary Emergency Contact: Zabrina Camacho, Home Phone: [phone]    DVT Prophylaxis: [x]Lovenox []Heparin []PCD [] [address] []Encouraged ambulation    Disposition: [x]Med/Surg [] Intermediate [] ICU/CCU  Admit status: [] Observation [x] Inpatient     +++++++++++++++++++++++++++++++++++++++++++++++++  Kaley Gaston DO  [facility], [facility]  +++++++++++++++++++++++++++++++++++++++++++++++++  NOTE: This report was transcribed using voice recognition software. Every effort was made to ensure accuracy; however, inadvertent computerized transcription errors may be present.

## 2021-01-09 NOTE — ED PROVIDER NOTES
HPI:  1/9/21,   Time: 1:35 AM BERNADETTE Zhang is a 80 y.o. female presenting to the ED for shortness of breath, beginning 1 day ago. The complaint has been persistent, severe in severity, and worsened by nothing. Patient is currently Covid positive. Per EMS the patient is normally alert and oriented x4 but has had increasing confusion. Patient was complaining to family with worsening shortness of breath therefore she was brought into our facility for further evaluation. Here in the emergency department patient is confused therefore HPI limited by patient's altered mental status. Review of Systems:   Unable to be obtained due to patient's altered mental status          --------------------------------------------- PAST HISTORY ---------------------------------------------  Past Medical History:  has a past medical history of Arthritis, CHF (congestive heart failure) ([facility] [address] CKD (chronic kidney disease), Diabetic eye exam ([facility] [address] Hyperlipidemia, Hypertension, Iron deficiency anemia, Type II or unspecified type diabetes mellitus without mention of complication, not stated as uncontrolled, Ulcer of foot ([address] and Valvular heart disease. Past Surgical History:  has a past surgical history that includes Appendectomy; Cholecystectomy; Carpal tunnel release; Cataract removal; Rotator cuff repair; Tonsillectomy; vascular surgery (4/18/2013); Upper gastrointestinal endoscopy (05/28/2015); joint replacement; Endoscopy, colon, diagnostic; us kidney duplex bilat (09/17/2016); Adenoidectomy; Total knee arthroplasty; Cataract removal; Upper gastrointestinal endoscopy (11/07/2017); doppler echocardiography (06/07/2018); and transesophageal echocardiogram (06/19/2019). Social History:  reports that she has never smoked. She has never used smokeless tobacco. She reports current alcohol use. She reports that she does not use drugs. Family History: family history is not on file.      The patients home medications have been reviewed. Allergies: Iodine and Iv dye [iodides]        ---------------------------------------------------PHYSICAL EXAM--------------------------------------    Constitutional/General: Alert and oriented x 1, appears ill  Head: Normocephalic and atraumatic  Eyes: PERRL, EOMI, conjunctive normal, sclera non icteric  Mouth: Oropharynx clear, handling secretions, no trismus, no asymmetry of the posterior oropharynx or uvular edema, dry mucous membranes  Neck: Supple, full ROM, non tender to palpation in the midline, no stridor, no crepitus, no meningeal signs  Respiratory: Decreased breath sounds bilaterally, increased work of breathing, tachypneic  Cardiovascular: Tachycardic regular rhythm. No murmurs, gallops, or rubs. 2+ distal pulses  GI:  Abdomen Soft, Non tender, Non distended. +BS. No organomegaly, no palpable masses,  No rebound, guarding, or rigidity. Musculoskeletal: Moves all extremities x 4. Warm and well perfused, no clubbing, cyanosis, or edema. Capillary refill <3 seconds  Integument: skin warm and dry. No rashes. Neurologic: GCS 15, no focal deficits, symmetric strength 5/5 in the upper and lower extremities bilaterally  Psychiatric: Normal Affect    -------------------------------------------------- RESULTS -------------------------------------------------  I have personally reviewed all laboratory and imaging results for this patient. Results are listed below.      LABS:  Results for orders placed or performed during the hospital encounter of 01/09/21   Respiratory Panel, Molecular, with COVID-19 (Restricted: peds pts or suitable admitted adults)    Specimen: Nasopharyngeal   Result Value Ref Range    Adenovirus by PCR Not Detected Not Detected    Bordetella parapertussis by PCR Not Detected Not Detected    Bordetella pertussis by PCR Not Detected Not Detected    Chlamydophilia pneumoniae by PCR Not Detected Not Detected    Coronavirus 229E by PCR Not Detected Not Detected    Coronavirus HKU1 by PCR Not Detected Not Detected    Coronavirus NL63 by PCR Not Detected Not Detected    Coronavirus OC43 by PCR Not Detected Not Detected    SARS-CoV-2, PCR DETECTED (A) Not Detected    Human Metapneumovirus by PCR Not Detected Not Detected    Human Rhinovirus/Enterovirus by PCR Not Detected Not Detected    Influenza A by PCR Not Detected Not Detected    Influenza B by PCR Not Detected Not Detected    Mycoplasma pneumoniae by PCR Not Detected Not Detected    Parainfluenza Virus 1 by PCR Not Detected Not Detected    Parainfluenza Virus 2 by PCR Not Detected Not Detected    Parainfluenza Virus 3 by PCR Not Detected Not Detected    Parainfluenza Virus 4 by PCR Not Detected Not Detected    Respiratory Syncytial Virus by PCR Not Detected Not Detected   CBC Auto Differential   Result Value Ref Range    WBC 17.7 (H) 4.5 - 11.5 E9/L    RBC 3.96 3.50 - 5.50 E12/L    Hemoglobin 10.1 (L) 11.5 - 15.5 g/dL    Hematocrit 32.3 (L) 34.0 - 48.0 %    MCV 81.6 80.0 - 99.9 fL    MCH 25.5 (L) 26.0 - 35.0 pg    MCHC 31.3 (L) 32.0 - 34.5 %    RDW 18.2 (H) 11.5 - 15.0 fL    Platelets 521 975 - 181 E9/L    MPV 10.5 7.0 - 12.0 fL    Neutrophils % 85.9 (H) 43.0 - 80.0 %    Immature Granulocytes % 2.1 0.0 - 5.0 %    Lymphocytes % 4.9 (L) 20.0 - 42.0 %    Monocytes % 6.9 2.0 - 12.0 %    Eosinophils % 0.1 0.0 - 6.0 %    Basophils % 0.1 0.0 - 2.0 %    Neutrophils Absolute 15.22 (H) 1.80 - 7.30 E9/L    Immature Granulocytes # 0.38 E9/L    Lymphocytes Absolute 0.87 (L) 1.50 - 4.00 E9/L    Monocytes Absolute 1.23 (H) 0.10 - 0.95 E9/L    Eosinophils Absolute 0.01 (L) 0.05 - 0.50 E9/L    Basophils Absolute 0.01 0.00 - 0.20 E9/L   CBC Auto Differential   Result Value Ref Range    WBC 15.5 (H) 4.5 - 11.5 E9/L    RBC 3.48 (L) 3.50 - 5.50 E12/L    Hemoglobin 8.9 (L) 11.5 - 15.5 g/dL    Hematocrit 28.6 (L) 34.0 - 48.0 %    MCV 82.2 80.0 - 99.9 fL    MCH 25.6 (L) 26.0 - 35.0 pg    MCHC 31.1 (L) 32.0 - 34.5 %    RDW 18.3 (H) 1.1    Protime-INR   Result Value Ref Range    Protime 12.1 9.3 - 12.4 sec    INR 1.1    APTT   Result Value Ref Range    aPTT 23.5 (L) 24.5 - 35.1 sec   APTT   Result Value Ref Range    aPTT 22.5 (L) 24.5 - 35.1 sec   Fibrinogen   Result Value Ref Range    Fibrinogen 681 (H) 225 - 540 mg/dL   Fibrinogen   Result Value Ref Range    Fibrinogen 652 (H) 225 - 540 mg/dL   C-Reactive Protein   Result Value Ref Range    CRP 7.2 (H) 0.0 - 0.4 mg/dL   Lactate Dehydrogenase   Result Value Ref Range     (H) 135 - 214 U/L   Ferritin   Result Value Ref Range    Ferritin 73 ng/mL   D-Dimer, Quantitative   Result Value Ref Range    D-Dimer, Quant 512 ng/mL DDU   D-Dimer, Quantitative   Result Value Ref Range    D-Dimer, Quant 389 ng/mL DDU   Troponin   Result Value Ref Range    Troponin 0.01 0.00 - 0.03 ng/mL   Lactate, Sepsis   Result Value Ref Range    Lactic Acid, Sepsis 2.0 (H) 0.5 - 1.9 mmol/L   Blood Gas, Arterial   Result Value Ref Range    Date Analyzed 20210109     Time Analyzed 0153     Source: Blood Arterial     pH, Blood Gas 7.494 (H) 7.350 - 7.450    PCO2 29.6 (L) 35.0 - 45.0 mmHg    PO2 56.0 (L) 75.0 - 100.0 mmHg    HCO3 22.3 22.0 - 26.0 mmol/L    B.E. -0.3 -3.0 - 3.0 mmol/L    O2 Sat 89.8 (L) 92.0 - 98.5 %    O2Hb 88.9 (L) 94.0 - 97.0 %    COHb 0.7 0.0 - 1.5 %    MetHb 0.3 0.0 - 1.5 %    O2 Content 13.6 mL/dL    HHb 10.1 (H) 0.0 - 5.0 %    tHb (est) 10.9 (L) 11.5 - 16.5 g/dL    Mode RA     Date Of Collection      Time Collected      Pt Temp 37.0 C     ID [id number]     Lab [id number]     Critical(s) Notified .  No Critical Values    Lactic Acid, Plasma   Result Value Ref Range    Lactic Acid 0.7 0.5 - 2.2 mmol/L   EKG 12 lead   Result Value Ref Range    Ventricular Rate 121 BPM    Atrial Rate 121 BPM    P-R Interval 164 ms    QRS Duration 88 ms    Q-T Interval 404 ms    QTc Calculation (Bazett) 573 ms    P Axis 82 degrees    R Axis -60 degrees    T Axis 17 degrees       RADIOLOGY:  Interpreted by Radiologist.  CT Head WO Contrast   Final Result   Multiple lacunar infarcts as before. Atrophy and likely chronic   microvascular ischemic disease. CTA PULMONARY W CONTRAST   Final Result   1. Limited evaluation of the pulmonary arteries due to contrast timing   bolus, however no obvious large filling defect in the central pulmonary   vasculature to suggest central PE. 2.  Commonly reported imaging features of (COVID-19 or viral) pneumonia are   present. Other processes such as influenza pneumonia and organizing   pneumonia, as can be seen with drug toxicity and connective tissue disease,   can cause a similar imaging pattern. PneTyp      XR CHEST PORTABLE   Final Result   1. New bilateral parenchymal opacities suspicious for multifocal pneumonia   in the appropriate clinical setting. 2.  Suspected hiatal hernia. EKG: This EKG is signed and interpreted by the EP. EKG shows sinus tachycardia 121 bpm.  Left anterior fascicular block. Nonspecific ST-T wave changes noted. Old infarcts noted. No STEMI.    ------------------------- NURSING NOTES AND VITALS REVIEWED ---------------------------   The nursing notes within the ED encounter and vital signs as below have been reviewed by myself. BP (!) 93/42   Pulse 91   Temp 98.9 °F (37.2 °C)   Resp 24   Ht 5' 4\" (1.626 m)   Wt 178 lb (80.7 kg)   SpO2 96%   BMI 30.55 kg/m²   Oxygen Saturation Interpretation: Normal    The patients available past medical records and past encounters were reviewed.         ------------------------------ ED COURSE/MEDICAL DECISION MAKING----------------------  Medications   acetaminophen (TYLENOL) tablet 650 mg (has no administration in time range)     Or   acetaminophen (TYLENOL) suppository 650 mg (has no administration in time range)   dexamethasone (DECADRON) injection 6 mg (6 mg Intravenous Given 1/9/21 1507)   0.9 % sodium chloride bolus (0 mLs Intravenous Stopped 1/9/21 4048)   diphenhydrAMINE (BENADRYL) injection 25 mg (25 mg Intravenous Given 1/9/21 0232)   0.9 % sodium chloride bolus (0 mLs Intravenous Stopped 1/9/21 1868)   iopamidol (ISOVUE-370) 76 % injection 60 mL (60 mLs Intravenous Given 1/9/21 6443)         ED COURSE:       Medical Decision Making: This is a 29-year-old female presents to the ED for shortness of breath. Upon arrival to the ED the patient was altered as well as tachypneic. Patient placed on BiPAP. Patient underwent laboratory work-up which showed a leukocytosis. Patient was Covid positive. Lactic acid slightly elevated. CTA showed no PE. Head CT shows no acute findings. Patient given IV fluids with improvement of her tachycardia and clearance of her lactic acid. Patient started on steroids. After being on BiPAP the patient was reexamined and delirium and altered mental status have resolved. Patient was weaned off of BiPAP and is currently on nasal cannula. Patient did have a PO2 of 56 on room air therefore the patient be admitted for hypoxia secondary to COVID-19. Case discussed with Dr. Harshal Loja who is agreed admit the patient for further work-up. Critical care: 31 minutes    I, Dr. Heather Pittman, am the primary provider for this encounter    This patient's ED course included: a personal history and physicial examination, re-evaluation prior to disposition, multiple bedside re-evaluations, IV medications, cardiac monitoring, continuous pulse oximetry and complex medical decision making and emergency management    This patient has remained hemodynamically stable during their ED course. Re-Evaluations:             Re-evaluation. Patients symptoms are improving    Counseling: The emergency provider has spoken with the patient and discussed todays results, in addition to providing specific details for the plan of care and counseling regarding the diagnosis and prognosis. Questions are answered at this time and they are agreeable with the plan. --------------------------------- IMPRESSION AND DISPOSITION ---------------------------------    IMPRESSION  1. Acute respiratory failure with hypoxia ([address]    2. COVID-19    3. Transient alteration of awareness        DISPOSITION  Disposition: Admit to telemetry  Patient condition is stable    NOTE: This report was transcribed using voice recognition software.  Every effort was made to ensure accuracy; however, inadvertent computerized transcription errors may be present       Rich Tidwell,   01/09/21 9810

## 2021-01-09 NOTE — CONSULTS
Department of Internal Medicine  Infectious Diseases   Consult Note      Reason for Consult:   COVID 19 pneumonia     Requesting Physician:  Dr Montoya Cure:            This is a 80 yrs old female with hx of DM, CHF, CKD, HTN presented to the ER with increased shortness of breath for 1 day- there had been no fever . She was tachypneic and hypoxic - required BiPAP - now on 3 L of [place] oxygen . WBC was 17 k , lactic acid 2 , procalcitonin 0.25, , Ferritin 73, D dimer 389 , CRP 7.2  Multiplex PCR was positive for SARS CoV2 ,   CT chest - bilateral patchy infiltrates . She was given remdesivir and decadron .       Past Medical History:    Past Medical History:   Diagnosis Date    Arthritis     [facility]congest[facility] failure) ([facility] [address]     CKD (chronic kidney disease)     Diabetic eye exam ([facility] [address] 05/21/2015    Dr Rita Aguilar diabetic retinopathy    Hyperlipidemia     Hypertension     Iron deficiency anemia     Type II or unspecified type diabetes mellitus without mention of complication, not stated as uncontrolled     Ulcer of foot ([facility] [address] 6/18/2013    Valvular heart disease        Past Surgical History:      Past Surgical History:   Procedure Laterality Date    ADENOIDECTOMY      APPENDECTOMY      CARPAL TUNNEL RELEASE      CATARACT REMOVAL      CATARACT REMOVAL      CHOLECYSTECTOMY      DOPPLER ECHOCARDIOGRAPHY  06/07/2018    Dr Yesica Young mitral regurg-mild tricuspid regurg-trace pulmonic regurg-trace to mild aortic regurg    ENDOSCOPY, COLON, DIAGNOSTIC      JOINT REPLACEMENT      BILAT KNEE    ROTATOR CUFF REPAIR      TONSILLECTOMY      TOTAL KNEE ARTHROPLASTY      TRANSESOPHAGEAL ECHOCARDIOGRAM  06/19/2019    DR Maldonado    UPPER GASTROINTESTINAL ENDOSCOPY  05/28/2015    UPPER GASTROINTESTINAL ENDOSCOPY  11/07/2017    DR JEFF SULLIVAN    US KIDNEY DUPLEX BILAT  09/17/2016    [facility]-normal    VASCULAR SURGERY  4/18/2013    Right leg angiogram by DR Francis       Current Medications:      Current Facility-Administered Medications   Medication Dose Route Frequency Provider Last Rate Last Admin    0.9 % sodium chloride infusion  1,000 mL Intravenous Continuous Izzy Prado, DO 75 mL/hr at 01/09/21 0811 1,000 mL at 01/09/21 0811    [START ON 1/10/2021] remdesivir 100 mg in sodium chloride 0.9 % 250 mL IVPB  100 mg Intravenous Q24H Jen Moreau MD        0.9 % sodium chloride bolus  30 mL Intravenous PRN Theodora Rolle MD        sodium chloride flush 0.9 % injection 10 mL  10 mL Intravenous 2 times per day Adriano Bell, DO   10 mL at 01/09/21 1035    sodium chloride flush 0.9 % injection 10 mL  10 mL Intravenous PRN Adriano Bell, DO        enoxaparin (LOVENOX) injection 30 mg  30 mg Subcutaneous BID Isael Liwanag, DO   30 mg at 01/09/21 1034    polyethylene glycol (GLYCOLAX) packet 17 g  17 g Oral Daily PRN Adriano Bell, DO        acetaminophen (TYLENOL) tablet 650 mg  650 mg Oral Q6H PRN Adriano Bell, DO        Or    acetaminophen (TYLENOL) suppository 650 mg  650 mg Rectal Q6H PRN Adriano Bell, DO        trimethobenzamide (TIGAN) injection 200 mg  200 mg Intramuscular Q6H PRN Theodora Rolle MD Mckinrubén Arguello Current ON 1/10/2021] dexamethasone (DECADRON) injection 6 mg  6 mg Intravenous Daily Isael Liwanag, DO           Allergies:  Iodine and Iv dye [iodides]    Social History:    No tobacco use     Family History:     Not pertinent to present illness     REVIEW OF SYSTEMS:    CONSTITUTIONAL:  No fever   HEENT: hard of hearing, denies sore throat   RESPIRATORY: denies cough, shortness of breath, sputum expectoration, chest pain. CARDIOVASCULAR:  Denies palpitation  GASTROINTESTINAL:  Denies abdomen pain, diarrhea or constipation.   GENITOURINARY:  Denies burning urination or frequency of urination  INTEGUMENT: denies wound , rash  HEMATOLOGIC/LYMPHATIC:  No bleeding or bruise   MUSCULOSKELETAL:  Denies leg pain , joint pain , joint swelling  NEUROLOGICAL:   Weakness     PHYSICAL EXAM:      Vitals:     Vitals:    01/09/21 0930   BP: 114/70   Pulse: 90   Resp: 20   Temp: 98 °F (36.7 °C)   SpO2: 96%       General Appearance:    Awake, alert , no acute distress. Head:    Normocephalic, atraumatic   Eyes:    No pallor, no icterus,   Ears:    No obvious deformity or drainage.    Nose:   No nasal drainage   Throat:   Mucosa moist, no oral thrush   Neck:   Supple, no lymphadenopathy   Lungs:     Clear to auscultation bilaterally, no wheeze    Heart:    Regular rate and rhythm, no murmur   Abdomen:     Soft, non-tender, bowel sounds present    Extremities:   No edema, no cyanosis ,no open wound,   Pulses:   Dorsalis pedis palpable    Skin:   no rashes or lesions       CBC with Differential:      Lab Results   Component Value Date    WBC 15.5 01/09/2021    RBC 3.48 01/09/2021    HGB 8.9 01/09/2021    HCT 28.6 01/09/2021     01/09/2021    MCV 82.2 01/09/2021    MCH 25.6 01/09/2021    MCHC 31.1 01/09/2021    RDW 18.3 01/09/2021    LYMPHOPCT 3.9 01/09/2021    PROMYELOPCT 0.9 08/24/2020    MONOPCT 4.1 01/09/2021    EOSPCT 1.2 02/20/2020    BASOPCT 0.1 01/09/2021    MONOSABS 0.63 01/09/2021    LYMPHSABS 0.60 01/09/2021    EOSABS 0.00 01/09/2021    BASOSABS 0.01 01/09/2021       CMP     Lab Results   Component Value Date     01/09/2021    K 3.9 01/09/2021     01/09/2021    CO2 21 01/09/2021    BUN 40 01/09/2021    CREATININE 1.2 01/09/2021    GFRAA 51 01/09/2021    LABGLOM 42 01/09/2021    GLUCOSE 131 01/09/2021    PROT 5.4 01/09/2021    LABALBU 3.1 01/09/2021    CALCIUM 7.3 01/09/2021    BILITOT 0.5 01/09/2021    ALKPHOS 76 01/09/2021    AST 44 01/09/2021    ALT 26 01/09/2021         Hepatic Function Panel:    Lab Results   Component Value Date    ALKPHOS 76 01/09/2021    ALT 26 01/09/2021    AST 44 01/09/2021    PROT 5.4 01/09/2021    BILITOT 0.5 01/09/2021    LABALBU 3.1 01/09/2021       PT/INR:    Lab Results   Component Value Date PROTIME 12.1 01/09/2021    INR 1.1 01/09/2021       TSH:    Lab Results   Component Value Date    TSH 4.09 08/13/2019       U/A:    Lab Results   Component Value Date    COLORU Yellow 11/24/2020    PHUR 5.0 11/24/2020    CLARITYU Clear 11/24/2020    SPECGRAV 1.010 11/24/2020    LEUKOCYTESUR Negative 11/24/2020    UROBILINOGEN 0.2 11/24/2020    BILIRUBINUR Negative 11/24/2020    BILIRUBINUR NEG 12/08/2016    BLOODU Negative 11/24/2020    GLUCOSEU Negative 11/24/2020       ABG:  No results found for: Tereasa Denier, PHART, THGBART, FMT5NZK, PO2ART, XCB0AQH    MICROBIOLOGY:              SARS-CoV-2, PCR DETECTEDAbnormal   Not Detected Final 01/09/2021  2:15 AM  - [facility]       Radiology :    CT chest - bilateral patchy infiltrates       IMPRESSION:     1. COVID 19 pneumonia - moderate   2. Leukocytosis   3. Respiratory failure - on 3 L of [place] oxgen , saturation 96 %         RECOMMENDATIONS:      1. Remdesivir 200 mg IV X 1 ( given ), and 100 mg IV q 24 hrs  2. Decadron 6 mg IV q 24 hrs  3. Wean off oxygen   4.  Monitor LFT and Cr     Thank you Dr Juan Turner for the consult

## 2021-01-09 NOTE — ED NOTES
Pt refused to leave bi-pap on.   Pt tolerating oxygen at [address] [facility], RN  01/09/21 3052

## 2021-01-09 NOTE — ED NOTES
Radiology Procedure Waiver   Name: Tori Scott  : 1927  MRN: [id number]    Date:  21    Time: 2:45 AM EST    Benefits of immediately proceeding with Radiology exam(s) without pre-testing outweigh the risks or are not indicated as specified below and therefore the following is/are being waived:    [] Pregnancy test   [] Patients LMP on-time and regular.   [] Patient had Tubal Ligation or has other Contraception Device. [] Patient  is Menopausal or Premenarcheal.    [] Patient had Full or Partial Hysterectomy. [] Protocol for Iodine allergy    [] MRI Questionnaire     [x] BUN/Creatinine   [] Patient age w/no hx of renal dysfunction. [] Patient on Dialysis. [] Recent Normal Labs.   Electronically signed by Rissa Henderson DO on 21 at 2:45 AM EST               Rissa Henderson DO  [phone]

## 2021-01-09 NOTE — PLAN OF CARE
Problem: Airway Clearance - Ineffective  Goal: Achieve or maintain patent airway  Outcome: Met This Shift     Problem: Gas Exchange - Impaired  Goal: Absence of hypoxia  Outcome: Met This Shift  Goal: Promote optimal lung function  Outcome: Met This Shift     Problem: Breathing Pattern - Ineffective  Goal: Ability to achieve and maintain a regular respiratory rate  Outcome: Met This Shift     Problem:  Body Temperature -  Risk of, Imbalanced  Goal: Ability to maintain a body temperature within defined limits  Outcome: Met This Shift  Goal: Will regain or maintain usual level of consciousness  Outcome: Met This Shift  Goal: Complications related to the disease process, condition or treatment will be avoided or minimized  Outcome: Met This Shift     Problem: Risk for Fluid Volume Deficit  Goal: Maintain normal heart rhythm  Outcome: Met This Shift  Goal: Maintain absence of muscle cramping  Outcome: Met This Shift  Goal: Maintain normal serum potassium, sodium, calcium, phosphorus, and pH  Outcome: Met This Shift     Problem: Loneliness or Risk for Loneliness  Goal: Demonstrate positive use of time alone when socialization is not possible  Outcome: Met This Shift     Problem: Fatigue  Goal: Verbalize increase energy and improved vitality  Outcome: Met This Shift     Problem: Patient Education: Go to Patient Education Activity  Goal: Patient/Family Education  Outcome: Met This Shift

## 2021-01-10 NOTE — PROGRESS NOTES
Department of Internal Medicine  Infectious Diseases  Progress  Note      C/C :   COVID 19 pneumonia     Pt is awake and alert  Denies fever and chills  Reports SOB   Afebrile       Current Facility-Administered Medications   Medication Dose Route Frequency Provider Last Rate Last Admin    levoFLOXacin (LEVAQUIN) 500 MG/100ML infusion 500 mg  500 mg Intravenous Once [employer], DO        0.9 % sodium chloride infusion  1,000 mL Intravenous Continuous [employer], DO   Stopped at 01/10/21 0040    remdesivir 100 mg in sodium chloride 0.9 % 250 mL IVPB  100 mg Intravenous Q24H [facility], DO        0.9 % sodium chloride bolus  30 mL Intravenous PRN [employer], DO        sodium chloride flush 0.9 % injection 10 mL  10 mL Intravenous 2 times per day [employer], DO   10 mL at 01/10/21 0838    sodium chloride flush 0.9 % injection 10 mL  10 mL Intravenous PRN [employer], DO        enoxaparin (LOVENOX) injection 30 mg  30 mg Subcutaneous BID [facility], DO   30 mg at 01/10/21 0838    polyethylene glycol (GLYCOLAX) packet 17 g  17 g Oral Daily PRN [employer], DO        acetaminophen (TYLENOL) tablet 650 mg  650 mg Oral Q6H PRN [employer], DO        Or    acetaminophen (TYLENOL) suppository 650 mg  650 mg Rectal Q6H PRN [employer], DO        trimethobenzamide (TIGAN) injection 200 mg  200 mg Intramuscular Q6H PRN [employer], DO        allopurinol (ZYLOPRIM) tablet 100 mg  100 mg Oral BID [facility], DO   100 mg at 01/10/21 0838    sacubitril-valsartan (ENTRESTO) 24-26 MG per tablet 1 tablet  1 tablet Oral BID [employer], DO   1 tablet at 01/10/21 0839    bumetanide (BUMEX) tablet 1 mg  1 mg Oral Daily [facility], DO   1 mg at 01/10/21 0838    carvedilol (COREG) tablet 3.125 mg  3.125 mg Oral BID  [facility], DO   3.125 mg at 01/10/21 6882    colchicine (COLCRYS) tablet 0.6 mg  0.6 mg Oral Daily PRN [employer], DO        [START ON 1/13/2021] vitamin D (ERGOCALCIFEROL) capsule 50,000 Units  50,000 Units Oral Weekly Brittany [place], DO        clopidogrel (PLAVIX) tablet 75 mg  75 mg Oral Daily [facility], DO   75 mg at 01/10/21 0839    insulin glargine (LANTUS) injection vial 34 Units  34 Units Subcutaneous Daily [facility], DO   34 Units at 01/10/21 0852    insulin lispro (HUMALOG) injection vial 0-12 Units  0-12 Units Subcutaneous TID WC [facility], DO   4 Units at 01/09/21 1803    QUEtiapine (SEROQUEL) tablet 25 mg  25 mg Oral Daily [facility], DO   25 mg at 01/10/21 0677    hydrOXYzine (VISTARIL) capsule 25 mg  25 mg Oral Q8H PRN [facility], DO        glucose (GLUTOSE) 40 % oral gel 15 g  15 g Oral PRN [facility], DO        dextrose 50 % IV solution  12.5 g Intravenous PRN Brittany [place], DO        glucagon (rDNA) injection 1 mg  1 mg Intramuscular PRN [facility], DO        dextrose 5 % solution  100 mL/hr Intravenous PRN [facility], DO        dexamethasone (DECADRON) injection 6 mg  6 mg Intravenous Daily [facility], DO   6 mg at 01/10/21 9235       REVIEW OF SYSTEMS:    CONSTITUTIONAL:  No fever   HEENT: hard of hearing, denies sore throat   RESPIRATORY: denies cough, shortness of breath, sputum expectoration, chest pain. CARDIOVASCULAR:  Denies palpitation  GASTROINTESTINAL:  Denies abdomen pain, diarrhea or constipation. GENITOURINARY:  Denies burning urination or frequency of urination  INTEGUMENT: denies wound , rash  HEMATOLOGIC/LYMPHATIC:  No bleeding or bruise   MUSCULOSKELETAL:  Denies leg pain , joint pain , joint swelling  NEUROLOGICAL:   Weakness     PHYSICAL EXAM:      Vitals:     BP (!) 144/78   Pulse 101   Temp 99.9 °F (37.7 °C) (Oral)   Resp 24   Ht 5' 4\" (1.626 m)   Wt 178 lb (80.7 kg)   SpO2 93%   BMI 30.55 kg/m²     General Appearance:    Awake, alert , no acute distress. Head:    Normocephalic, atraumatic   Eyes:    No pallor, no icterus,   Ears:    No obvious deformity or drainage.    Nose:   No nasal drainage   Throat:   Mucosa

## 2021-01-10 NOTE — PLAN OF CARE
Problem: Airway Clearance - Ineffective  Goal: Achieve or maintain patent airway  Outcome: Met This Shift     Problem: Gas Exchange - Impaired  Goal: Absence of hypoxia  1/10/2021 1259 by Shabbir Gaspar RN  Outcome: Met This Shift  1/10/2021 0219 by Stalin Russell RN  Outcome: Ongoing  Goal: Promote optimal lung function  Outcome: Met This Shift     Problem: Breathing Pattern - Ineffective  Goal: Ability to achieve and maintain a regular respiratory rate  Outcome: Met This Shift     Problem: Skin Integrity:  Goal: Will show no infection signs and symptoms  Description: Will show no infection signs and symptoms  Outcome: Met This Shift  Goal: Absence of new skin breakdown  Description: Absence of new skin breakdown  Outcome: Met This Shift     Problem: Falls - Risk of:  Goal: Will remain free from falls  Description: Will remain free from falls  1/10/2021 1259 by Shabbir Gaspar RN  Outcome: Met This Shift  1/10/2021 0219 by Stalin Russell RN  Outcome: Met This Shift  Goal: Absence of physical injury  Description: Absence of physical injury  Outcome: Met This Shift

## 2021-01-10 NOTE — PROGRESS NOTES
Dr. Verdon Libman notified that patient became hypoxic 84 percent on 2 L when trying to get out of bed to us bedside commode. Oxygen saturation only recovered to 87 percent on 6 L. HR was 117 on 2L but has recovered to 100 on NRB  With oxygen saturation of 96 percent. Breath sounds moist with rhonchi. Orders with stat portable chest x ray telephone with read back.

## 2021-01-10 NOTE — CONSULTS
vascular surgery (4/18/2013); Upper gastrointestinal endoscopy (05/28/2015); joint replacement; Endoscopy, colon, diagnostic; us kidney duplex bilat (09/17/2016); Adenoidectomy; Total knee arthroplasty; Cataract removal; Upper gastrointestinal endoscopy (11/07/2017); doppler echocardiography (06/07/2018); and transesophageal echocardiogram (06/19/2019). SOCIAL HISTORY:  reports that she has never smoked. She has never used smokeless tobacco. She reports current alcohol use. She reports that she does not use drugs. FAMILY  HISTORY: family history is not on file. MEDICATIONS:    Prior to Admission medications    Medication Sig Start Date End Date Taking? Authorizing Provider   QUEtiapine (SEROQUEL) 25 MG tablet Take 1 tablet by mouth 2 times daily  Patient taking differently: Take 25 mg by mouth daily  12/14/20  Yes Yaritza Cooper MD   predniSONE (DELTASONE) 2.5 MG tablet 2.5 mg oral daily on Monday Wednesday and Friday 11/27/20  Yes Ignacio Be DO   insulin lispro (HUMALOG) 100 UNIT/ML injection vial Inject 0-12 Units into the skin 3 times daily (with meals) 11/27/20  Yes Ignacio Be DO   Insulin Degludec (TRESIBA SC) Inject 34 Units into the skin daily Once in the AM before breakfast   Yes Historical Provider, MD   clopidogrel (PLAVIX) 75 MG tablet TAKE ONE TABLET BY MOUTH DAILY 11/23/20  Yes Yaritza Cooper MD   Continuous Blood Gluc Sensor (FREESTYLE SHON 14 DAY SENSOR) MIS E11.8 11/3/20  Yes Yaritza Cooper MD   Continuous Blood Gluc  (FREESTYLE SHON 14 DAY READER) Fernando Ruano e11.8 11/3/20  Yes Yaritza Cooper MD   blood glucose test strips (ASCENSIA AUTODISC VI;ONE TOUCH ULTRA TEST VI) strip 1 each by In Vitro route 4 times daily Use to test blood sugar 2 times daily.  E11.8 10/26/20  Yes Yaritza Cooper MD   vitamin D (ERGOCALCIFEROL) 1.25 MG (81242 UT) CAPS capsule Take 50,000 Units by mouth once a week Given Wednesday   Yes Historical Provider, MD   carvedilol (COREG) 3.125 MG tablet Take 3.125 mg by mouth 2 times daily (with meals)    Yes Historical Provider, MD   bumetanide (BUMEX) 1 MG tablet Take 1 tablet by mouth daily 10/18/19  Yes Historical Provider, MD   sacubitril-valsartan (ENTRESTO) 24-26 MG per tablet Take 1 tablet by mouth 2 times daily    Yes Historical Provider, MD   allopurinol (ZYLOPRIM) 100 MG tablet Take 100 mg by mouth 2 times daily  12/27/17  Yes Historical Provider, MD   hydrOXYzine (ATARAX) 25 MG tablet Take 1 tablet by mouth every 8 hours as needed for Itching 11/23/20   Emma Teague MD       ALLERGIES: Iodine and Iv dye [iodides]       REVIEW OF SYSTEMS:  Otherwise negative if not reported or listed below  Constitutional:  No unanticipated weight loss. No change in sleep pattern or activity. + fevers, No chills or rigors. Eyes:   No visual changes or diplopia. No scleral icterus. ENT:    No Headaches, hearing loss or vertigo. No mouth sores or sore throat. Cardiovascular:  + chest discomfort, palpitations. Respiratory:  + cough, No wheezing      No sputum production. No hemoptysis, pleuritic pain. Gastrointestinal:  No abdominal pain, appetite loss, blood in stools. No hematemesis  Genitourinary:  No dysuria, trouble voiding or hematuria. No nocturia. Musculoskeletal:   No weakness or joint complaints. Integumentary: No rashes or pruritis. Neurological:  No headache, numbness or tingling. Psychiatric:   No effect on mood, memory, mentation, or behavior. No anxiety or depression. Endocrine:   No excessive thirst, fluid intake, or urination. No tremor. Hematologic: No abnormal bruising or bleeding. Lymphatic:  No swollen lymph nodes. Immunologic:  No hives or hx of anaphaxsis.       OBJECTIVE:     PHYSICAL EXAM:   VITALS:   Vitals:    01/10/21 0551 01/10/21 0600 01/10/21 0830 01/10/21 0845   BP:   (!) 144/78    Pulse: 92  101    Resp:   24    Temp:   99.9 °F (37.7 °C)    TempSrc:   Oral    SpO2: 99% 99% 94% 93%   Weight:       Height:            Intake/Output Summary (Last 24 hours) at 1/10/2021 1424  Last data filed at 1/10/2021 0825  Gross per 24 hour   Intake 195 ml   Output --   Net 195 ml        CONSTITUTIONAL:   Alert  SKIN:     No rash, No suspicious lesions or skin discoloration  HEENT:     EOMI, MMM, No thrush  NECK:    No bruits, No JVP apprechiated  CV:      Sinus,  + murmur, No rubs, No gallops  PULMONARY:   Couse BS,  No Wheezing, No Rales, No Rhonchi  ABDOMEN:     Soft, non-tender. BS normal. No R/R/G  EXT:    No deformities . No clubbing. No lower extremity edema, No venous stasis  PULSE:   Appears equal and palpable.   PSYCHIATRIC:  Seems appropriate, No acute psycosis  MS:    No fractures, No gross weakness  NEUROLOGIC:   The clinical assessment is non-focal     DATA: IMAGING & TESTING:     LABORATORY TESTS:    CBC:   Lab Results   Component Value Date    WBC 10.0 01/10/2021    RBC 3.29 01/10/2021    HGB 8.4 01/10/2021    HCT 26.5 01/10/2021    MCV 80.5 01/10/2021    MCH 25.5 01/10/2021    MCHC 31.7 01/10/2021    RDW 18.2 01/10/2021     01/10/2021    MPV 11.1 01/10/2021     CMP:    Lab Results   Component Value Date     01/10/2021    K 4.1 01/10/2021     01/10/2021    CO2 20 01/10/2021    BUN 43 01/10/2021    CREATININE 1.1 01/10/2021    GFRAA 56 01/10/2021    LABGLOM 46 01/10/2021    GLUCOSE 168 01/10/2021    PROT 5.6 01/10/2021    LABALBU 3.1 01/10/2021    CALCIUM 7.7 01/10/2021    BILITOT 0.4 01/10/2021    ALKPHOS 76 01/10/2021    AST 45 01/10/2021    ALT 25 01/10/2021     TIBC:    Lab Results   Component Value Date    TIBC 345 11/25/2020     FERRITIN:    Lab Results   Component Value Date    FERRITIN 73 01/09/2021     Fibrinogen Level:  No components found for: FIB     PRO-BNP:   Lab Results   Component Value Date    PROBNP 2,913 (H) 11/28/2020    PROBNP 1,805 (H) 11/27/2020      ABGs:   Lab Results   Component Value Date    PH 7.494 01/09/2021    PO2 56.0 01/09/2021 PCO2 29.6 01/09/2021     Hemoglobin A1C: No components found for: HGBA1C    IMAGING:  Imaging tests were completed and reviewed and discussed radiology and care team involved and reveals   Ct Head Wo Contrast    Result Date: 1/9/2021  EXAMINATION: CT OF THE HEAD WITHOUT CONTRAST  1/9/2021 4:14 am TECHNIQUE: CT of the head was performed without the administration of intravenous contrast. Dose modulation, iterative reconstruction, and/or weight based adjustment of the mA/kV was utilized to reduce the radiation dose to as low as reasonably achievable. COMPARISON: 24 November 2020 HISTORY: ORDERING SYSTEM PROVIDED HISTORY: ams TECHNOLOGIST PROVIDED HISTORY: Reason for exam:->ams Has a \"code stroke\" or \"stroke alert\" been called? ->No What reading provider will be dictating this exam?->CRC FINDINGS: No hemorrhage, mass or midline shift. 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 Stable atrophy and likely chronic microvascular ischemic disease. Unremarkable bony calvarium. Multiple lacunar infarcts as before. Atrophy and likely chronic microvascular ischemic disease. Worsening bilateral pneumonia. Xr Chest Portable    Result Date: 1/9/2021  EXAMINATION: ONE XRAY VIEW OF THE CHEST 1/9/2021 1:08 am COMPARISON: November 24, 2020. HISTORY: ORDERING SYSTEM PROVIDED HISTORY: sob TECHNOLOGIST PROVIDED HISTORY: Reason for exam:->sob What reading provider will be dictating this exam?->CRC FINDINGS: Cardiomegaly again noted with retrocardiac lucency. New focal density in the left mid lung and patchy densities in the right upper lobe and right lower lobes. No pneumothorax or large pleural effusion. 1.  New bilateral parenchymal opacities suspicious for multifocal pneumonia in the appropriate clinical setting. 2.  Suspected hiatal hernia.     Cta Pulmonary W Contrast    Result Date: 1/9/2021  EXAMINATION: CTA OF THE CHEST 1/9/2021 4:14 am TECHNIQUE: CTA of the chest was performed after the administration of follow  3. Check IL6 (research)  4. Monitor Cr and hydrate if can  5. Titrate oxygen  6.  Anticoagulation as high risk        Jocy Cutler, MPH, FCCP, Jacob Mishra  Professor of Internal Medicine  Pulmonary, Critical Care and Sleep Medicine

## 2021-01-10 NOTE — PROGRESS NOTES
Hospitalist Progress Note      SYNOPSIS: Patient admitted on 1/9/2021 for Pneumonia due to COVID-19 virus      SUBJECTIVE:    Patient seen and examined  Records reviewed. Patient resting comfortably in bed. She denies shortness of breath. She still appears to be slightly confused. No chest pain, chest pressure, chest tightness. Patient was noted to have increasing oxygen requirements overnight. She is currently on 10 L high flow nasal cannula. She was on 2 L nasal cannula yesterday evening. DIET: DIET LOW SODIUM 2 GM;  CODE: DNR-CCA    Intake/Output Summary (Last 24 hours) at 1/10/2021 1113  Last data filed at 1/10/2021 0825  Gross per 24 hour   Intake 195 ml   Output --   Net 195 ml       OBJECTIVE:    BP (!) 144/78   Pulse 101   Temp 99.9 °F (37.7 °C) (Oral)   Resp 24   Ht 5' 4\" (1.626 m)   Wt 178 lb (80.7 kg)   SpO2 93%   BMI 30.55 kg/m²     General appearance: No apparent distress, appears stated age and cooperative. HEENT:  Conjunctivae/corneas clear. Normocephalic, atraumatic. Neck: Supple. No jugular venous distention. Respiratory: Diminished, bilaterally. Equal and symmetric chest excursion with inspiration. Cardiovascular: Regular rate and rhythm. Normal S1-S2. No murmurs, rubs, or gallops. Abdomen: Soft, nontender, nondistended  Musculoskeletal: No clubbing, cyanosis, no lower extremity edema, bilaterally. Brisk capillary refill. Skin:  No rashes  on visible skin  Neurologic: awake, alert and following commands     ASSESSMENT:    Principal Problem:    Pneumonia due to COVID-19 virus  Active Problems:    Chronic systolic congestive heart failure (HCC)    Essential hypertension    CKD (chronic kidney disease) stage 3, GFR 30-59 ml/min ([facility])    [id number]    Acute metabolic encephalopathy  Resolved Problems:    * No resolved hospital problems.  *       PLAN:    ID recommendations appreciated  Levaquin  Continue home medications  Pulmonology consult regarding worsening hypoxemia/increasing oxygen requirements in the setting of COVID-19 pneumonia  Remdesivir, dexamethasone    Patient is DNR-CCA    DISPOSITION: To be determined    Medications:  REVIEWED DAILY    Infusion Medications    sodium chloride Stopped (01/10/21 0040)    dextrose       Scheduled Medications    levofloxacin  500 mg Intravenous Once    remdesivir IVPB  100 mg Intravenous Q24H    sodium chloride flush  10 mL Intravenous 2 times per day    enoxaparin  30 mg Subcutaneous BID    allopurinol  100 mg Oral BID    sacubitril-valsartan  1 tablet Oral BID    bumetanide  1 mg Oral Daily    carvedilol  3.125 mg Oral BID     [START ON 1/13/2021] vitamin D  50,000 Units Oral Weekly    clopidogrel  75 mg Oral Daily    insulin glargine  34 Units Subcutaneous Daily    insulin lispro  0-12 Units Subcutaneous TID     QUEtiapine  25 mg Oral Daily    dexamethasone  6 mg Intravenous Daily     PRN Meds: sodium chloride, sodium chloride flush, polyethylene glycol, acetaminophen **OR** acetaminophen, trimethobenzamide, colchicine, hydrOXYzine, glucose, dextrose, glucagon (rDNA), dextrose    Labs:     Recent Labs     01/09/21  0133 01/09/21  0533 01/10/21  1000   WBC 17.7* 15.5* 10.0   HGB 10.1* 8.9* 8.4*   HCT 32.3* 28.6* 26.5*    240 236       Recent Labs     01/09/21  0133 01/09/21  0533 01/10/21  1000   * 134 134   K 4.1 3.9 4.1   CL 95* 104 101   CO2 22 21* 20*   BUN 46* 40* 43*   CREATININE 1.3* 1.2* 1.1*   CALCIUM 8.4* 7.3* 7.7*       Recent Labs     01/09/21  0133 01/09/21  0533 01/10/21  1000   PROT 6.3* 5.4* 5.6*   ALKPHOS 92 76 76   ALT 35* 26 25   AST 49* 44* 45*   BILITOT 0.5 0.5 0.4       Recent Labs     01/09/21  0133 01/09/21  0533 01/10/21  1000   INR 1.1 1.1 1.1       Recent Labs     01/09/21 0133   TROPONINI 0.01       Chronic labs:    Lab Results   Component Value Date    CHOL 205 (H) 11/23/2018    TRIG 219 (H) 11/23/2018    HDL 43 11/23/2018    LDLCALC 118 (H) 11/23/2018    TSH 4.09 08/13/2019    INR 1.1 01/10/2021    LABA1C 7.9 (H) 11/26/2020       Radiology: REVIEWED DAILY    +++++++++++++++++++++++++++++++++++++++++++++++++  Carney [employer] Physician - [address], [place]  +++++++++++++++++++++++++++++++++++++++++++++++++  NOTE: This report was transcribed using voice recognition software. Every effort was made to ensure accuracy; however, inadvertent computerized transcription errors may be present.

## 2021-01-11 NOTE — PLAN OF CARE
Problem:  Body Temperature -  Risk of, Imbalanced  Goal: Ability to maintain a body temperature within defined limits  Outcome: Met This Shift  Goal: Will regain or maintain usual level of consciousness  Outcome: Met This Shift  Goal: Complications related to the disease process, condition or treatment will be avoided or minimized  Outcome: Met This Shift     Problem: Isolation Precautions - Risk of Spread of Infection  Goal: Prevent transmission of infection  Outcome: Met This Shift     Problem: Risk for Fluid Volume Deficit  Goal: Maintain normal heart rhythm  Outcome: Met This Shift  Goal: Maintain absence of muscle cramping  Outcome: Met This Shift  Goal: Maintain normal serum potassium, sodium, calcium, phosphorus, and pH  Outcome: Met This Shift     Problem: Falls - Risk of:  Goal: Will remain free from falls  Description: Will remain free from falls  Outcome: Met This Shift  Goal: Absence of physical injury  Description: Absence of physical injury  Outcome: Met This Shift

## 2021-01-11 NOTE — CARE COORDINATION
Care Coordination - + COVID 19  The patient presents to the ed for cc sob with increased confusion and has a pmh chd. She is alert x 3 appears ill. She on 02 02 15 liters sat 93 % and Id,pulm are following and she is on Iv Remdesivir and iv decadron. Her PCP is Dr Damon Romano and her pharmacy is [facility]. Pt ordered pending and 0t eval [facility] 15/24 and it states the patient lives with her daughter in a 1 story home with 3 steps to get into the home. Dme she has a shower chair, wheeled walker and a wheelchair. I have left a voicemail for the daughter Joy Carter @ [phone] and im awaiting a call back to discuss discharge planning once she is medically stable.  I will follow

## 2021-01-11 NOTE — PROGRESS NOTES
Hospitalist Progress Note      SYNOPSIS: Patient admitted on 1/9/2021 for Pneumonia due to COVID-19 virus      SUBJECTIVE:    Patient seen and examined  Records reviewed. Pt sitting up in chair comfortably about to eat lunch  Oxygen requirements have increased from 10 to 15 L  DIET: DIET LOW SODIUM 2 GM;  CODE: DNR-CCA    Intake/Output Summary (Last 24 hours) at 1/11/2021 1651  Last data filed at 1/11/2021 1456  Gross per 24 hour   Intake 120 ml   Output 1 ml   Net 119 ml       OBJECTIVE:    /63   Pulse 65   Temp 97.3 °F (36.3 °C) (Infrared)   Resp 18   Ht 5' 4\" (1.626 m)   Wt 178 lb (80.7 kg)   SpO2 93%   BMI 30.55 kg/m²     General appearance: No apparent distress, appears stated age and cooperative. HEENT:  Conjunctivae/corneas clear. Normocephalic, atraumatic. Neck: Supple. No jugular venous distention. Respiratory: Diminished, bilaterally. Equal and symmetric chest excursion with inspiration. Cardiovascular: Regular rate and rhythm. Normal S1-S2. No murmurs, rubs, or gallops. Abdomen: Soft, nontender, nondistended  Musculoskeletal: No clubbing, cyanosis, no lower extremity edema, bilaterally. Brisk capillary refill. Skin:  No rashes  on visible skin  Neurologic: awake, alert and following commands     ASSESSMENT:    Principal Problem:    Pneumonia due to COVID-19 virus  Active Problems:    Chronic systolic congestive heart failure (HCC)    Essential hypertension    CKD (chronic kidney disease) stage 3, GFR 30-59 ml/min ([facility])    [id number]    Acute metabolic encephalopathy  Resolved Problems:    * No resolved hospital problems. *       PLAN:  Cardiology consulted due to hx of mitral regurg and concern for possible fluid overload, however clinically pt course following as expected for COVID   Awaiting labs for today. ..   ID recommendations appreciated  Levaquin  Continue home medications  Pulmonology consult regarding worsening hypoxemia/increasing oxygen requirements in the setting of COVID-19 pneumonia  Remdesivir, dexamethasone    Discussed with son on 1/11/2021    Patient is DNR-CCA    DISPOSITION: To be determined    Medications:  REVIEWED DAILY    Infusion Medications    sodium chloride Stopped (01/10/21 0040)    dextrose       Scheduled Medications    levofloxacin  250 mg Intravenous Q24H    remdesivir IVPB  100 mg Intravenous Q24H    sodium chloride flush  10 mL Intravenous 2 times per day    enoxaparin  30 mg Subcutaneous BID    allopurinol  100 mg Oral BID    sacubitril-valsartan  1 tablet Oral BID    bumetanide  1 mg Oral Daily    carvedilol  3.125 mg Oral BID WC    [START ON 1/13/2021] vitamin D  50,000 Units Oral Weekly    clopidogrel  75 mg Oral Daily    insulin glargine  34 Units Subcutaneous Daily    insulin lispro  0-12 Units Subcutaneous TID     QUEtiapine  25 mg Oral Daily    dexamethasone  6 mg Intravenous Daily     PRN Meds: EPINEPHrine, sodium chloride, sodium chloride flush, polyethylene glycol, acetaminophen **OR** acetaminophen, trimethobenzamide, colchicine, hydrOXYzine, glucose, dextrose, glucagon (rDNA), dextrose    Labs:     Recent Labs     01/09/21  0133 01/09/21  0533 01/10/21  1000   WBC 17.7* 15.5* 10.0   HGB 10.1* 8.9* 8.4*   HCT 32.3* 28.6* 26.5*    240 236       Recent Labs     01/09/21  0133 01/09/21  0533 01/10/21  1000   * 134 134   K 4.1 3.9 4.1   CL 95* 104 101   CO2 22 21* 20*   BUN 46* 40* 43*   CREATININE 1.3* 1.2* 1.1*   CALCIUM 8.4* 7.3* 7.7*       Recent Labs     01/09/21  0133 01/09/21  0533 01/10/21  1000   PROT 6.3* 5.4* 5.6*   ALKPHOS 92 76 76   ALT 35* 26 25   AST 49* 44* 45*   BILITOT 0.5 0.5 0.4       Recent Labs     01/09/21  0133 01/09/21  0533 01/10/21  1000   INR 1.1 1.1 1.1       Recent Labs     01/09/21 0133   TROPONINI 0.01       Chronic labs:    Lab Results   Component Value Date    CHOL 205 (H) 11/23/2018    TRIG 219 (H) 11/23/2018    HDL 43 11/23/2018    LDLCALC 118 (H) 11/23/2018    TSH 4.09 08/13/2019    INR 1.1 01/10/2021    LABA1C 7.9 (H) 11/26/2020       Radiology: REVIEWED DAILY    +++++++++++++++++++++++++++++++++++++++++++++++++  Ellen Snowden Physician - [address], [place]  +++++++++++++++++++++++++++++++++++++++++++++++++  NOTE: This report was transcribed using voice recognition software. Every effort was made to ensure accuracy; however, inadvertent computerized transcription errors may be present.

## 2021-01-11 NOTE — PROGRESS NOTES
Assist   Supervision    Bathing Moderate Assist  Supervision    Toileting Moderate Assist   Supervision    Bed Mobility  Supine to sit: NT   Sit to supine: Maximal Assist   Supine to sit: Supervision   Sit to supine: Supervision    Functional Transfers Minimal Assist   Supervision    Functional Mobility Minimal Assist     Short ambulation tasks with w/w; cuing on posture, w/w management and safety.  + desaturation (see below)  Supervision    Balance Sitting:     Static:  SBA    Dynamic: SBA  Standing: min A     Activity Tolerance P+    Limited due to SOB and desaturation; see below  F+   Visual/  Perceptual Glasses: yes  wfl                  Vitals:  15L O2 via HFNC   Rest in chair: O2 sat 86-88%, HR 86-93 bpm  Seated ADL tasks: O2 sat 86-88%, HR  bpm   (cuing on pursed lip breathing / energy conservation techniques)  Sit to stand / stand pivot to bed: O2 sat 84%,  bpm  Sitting balance at EOB (1 minute cuing on breathing): O2 sat 81-82%,  bpm  Sit to supine (2 minutes with pursed lip breathing): O2 sat 78-81%, HR  bpm  Placed on additional 15L NRB (less than 2 minutes): O2 sat 91%, HR 89 bpm  Pt repositioning / seated with HOB elevated (15L HFNC only for less than 2 minutes): O2 sat 84%, HR 95 bpm  15L O2 via HFNC + 15L NRB  Rest semi-supine in bed:  O2 sats 93%, HR 87  (Nursing notified and present)       Hand dominance: right     Strength ROM Additional Info:    RUE   3+/5 wfl good  and wfl FMC/dexterity noted during ADL tasks     LUE 3+/5 wfl good  and wfl FMC/dexterity noted during ADL tasks     Hearing: [place]  Sensation:wfl  Tone: wfl  Edema:none noted                             Comments: Upon arrival, patient supine in bed and agreeable to OT session - nursing clearance obtained prior to session. Pt demonstrating fair understanding of education/techniques, requiring additional training / education.  At end of session, patient semi-supine in bed with call light and phone within transfers  Therapeutic activities to facilitate/challenge dynamic balance, stand tolerance, fine motor dexterity/in-hand manipulation for increased independence with ADLs  Neuro-muscular re-education: facilitation of righting/equilibrium reactions, midline orientation, scapular stability/mobility, normalization of muscle tone, and facilitation of volitional active controled movement      Rehab Potential: Good for established goals     Patient / Family Goal: d/c home      Patient and/or family were instructed on functional diagnosis, prognosis/goals and OT plan of care. Demonstrated fair- understanding.       AM-PAC Daily Activity Inpatient   How much help for putting on and taking off regular lower body clothing?: A Lot  How much help for Bathing?: A Lot  How much help for Toileting?: A Lot  How much help for putting on and taking off regular upper body clothing?: A Little  How much help for taking care of personal grooming?: A Little  How much help for eating meals?: A Little  AM-[facility] Inpatient Daily Activity Raw Score: 15  AM-PAC Inpatient ADL T-Scale Score : 34.69  ADL Inpatient CMS 0-100% Score: 56.46  ADL Inpatient CMS G-Code Modifier : CK         Eval Complexity: mod  Profile and History- med (extensive chart review)  Assessment of Occupational Performance and Identification of Deficits- med  Clinical Decision Making- med     Time In: 1310  Time Out: 1347  Total Treatment Time: 24 minutes    Min Units   OT Eval Low 84773       OT Eval Medium 97166  x 1   OT Eval High 74829       OT Re-Eval [id number]       Therapeutic Ex 10706       Therapeutic Activities 66962  02  1   ADL/Self Care 00789  8  1   Orthotic Management 40546       Neuro Re-Ed 33493       Non-Billable Time          Evaluation Time includes thorough review of current medical information, gathering information on past medical history/social history and prior level of function, completion of standardized testing/informal observation of tasks, assessment of data and education on plan of care and goals.            Mildred Olguin OTR/L #[id number]

## 2021-01-11 NOTE — PROGRESS NOTES
Department of Internal Medicine  Infectious Diseases  Progress  Note      C/C :   COVID 19 pneumonia     Pt is awake and alert   Denies fever and chills   Reports SOB   Afebrile       Current Facility-Administered Medications   Medication Dose Route Frequency Provider Last Rate Last Admin    levoFLOXacin (LEVAQUIN) 250 MG/50ML infusion 250 mg  250 mg Intravenous Q24H Yvette Chatman, DO   Stopped at 01/11/21 0351    EPINEPHrine 1 MG/10ML injection 0.5 mg  0.5 mg Intravenous PRN [facility] DO        0.9 % sodium chloride infusion  1,000 mL Intravenous Continuous Yvette Chatman DO   Stopped at 01/10/21 0040    remdesivir 100 mg in sodium chloride 0.9 % 250 mL IVPB  100 mg Intravenous Q24H Yevtte Chatman DO   Stopped at 01/10/21 1739    0.9 % sodium chloride bolus  30 mL Intravenous PRN Yvette Chatman DO        sodium chloride flush 0.9 % injection 10 mL  10 mL Intravenous 2 times per day Yvette Chatman DO   10 mL at 01/11/21 1012    sodium chloride flush 0.9 % injection 10 mL  10 mL Intravenous PRN Isael Hickey DO   10 mL at 01/10/21 2331    enoxaparin (LOVENOX) injection 30 mg  30 mg Subcutaneous BID Isael Hickey DO   30 mg at 01/11/21 1011    polyethylene glycol (GLYCOLAX) packet 17 g  17 g Oral Daily PRN Yvette Chatman DO        acetaminophen (TYLENOL) tablet 650 mg  650 mg Oral Q6H PRN Yvette Chatman DO        Or    acetaminophen (TYLENOL) suppository 650 mg  650 mg Rectal Q6H PRN Yvette Chatman, DO        trimethobenzamide (TIGAN) injection 200 mg  200 mg Intramuscular Q6H PRN Yvette Chatman DO        allopurinol (ZYLOPRIM) tablet 100 mg  100 mg Oral BID Isael Hickey DO   100 mg at 01/11/21 1010    sacubitril-valsartan (ENTRESTO) 24-26 MG per tablet 1 tablet  1 tablet Oral BID Yvette Chatman DO   1 tablet at 01/11/21 1011    bumetanide (BUMEX) tablet 1 mg  1 mg Oral Daily Isael Hickey DO   1 mg at 01/11/21 1011    carvedilol (COREG) tablet 3.125 mg  3.125 mg Oral BID  Isael Hickey DO   3.125 mg at 01/11/21 1011    colchicine (COLCRYS) tablet 0.6 mg  0.6 mg Oral Daily PRN Joe Hernandez, DO        [START ON 1/13/2021] vitamin D (ERGOCALCIFEROL) capsule 50,000 Units  50,000 Units Oral Weekly Nanberekety Sheals, DO        clopidogrel (PLAVIX) tablet 75 mg  75 mg Oral Daily Isael Liwanag, DO   75 mg at 01/11/21 1011    insulin glargine (LANTUS) injection vial 34 Units  34 Units Subcutaneous Daily Isael Liwanag, DO   34 Units at 01/10/21 0852    insulin lispro (HUMALOG) injection vial 0-12 Units  0-12 Units Subcutaneous TID WC Isael Liwanag, DO   2 Units at 01/10/21 1206    QUEtiapine (SEROQUEL) tablet 25 mg  25 mg Oral Daily Isael Liwanag, DO   25 mg at 01/11/21 1011    hydrOXYzine (VISTARIL) capsule 25 mg  25 mg Oral Q8H PRN Isael Liwanag, DO        glucose (GLUTOSE) 40 % oral gel 15 g  15 g Oral PRN Isael Liwanag, DO        dextrose 50 % IV solution  12.5 g Intravenous PRN Joe Hernandez, DO        glucagon (rDNA) injection 1 mg  1 mg Intramuscular PRN Isael Liwanag, DO        dextrose 5 % solution  100 mL/hr Intravenous PRN Isael Liwanag, DO        dexamethasone (DECADRON) injection 6 mg  6 mg Intravenous Daily Isael Liwanag, DO   6 mg at 01/11/21 1011       REVIEW OF SYSTEMS:    CONSTITUTIONAL:  No fever   HEENT: hard of hearing, denies sore throat   RESPIRATORY: SOB . CARDIOVASCULAR:  Denies palpitation  GASTROINTESTINAL:  Denies abdomen pain, diarrhea or constipation. GENITOURINARY:  Denies burning urination or frequency of urination  INTEGUMENT: denies wound , rash  HEMATOLOGIC/LYMPHATIC:  No bleeding or bruise   MUSCULOSKELETAL:  Denies leg pain , joint pain , joint swelling  NEUROLOGICAL:   Weakness     PHYSICAL EXAM:      Vitals:     /63   Pulse 65   Temp 97.3 °F (36.3 °C) (Infrared)   Resp 18   Ht 5' 4\" (1.626 m)   Wt 178 lb (80.7 kg)   SpO2 93%   BMI 30.55 kg/m²     General Appearance:    Awake, alert , no acute distress.    Head:    Normocephalic, atraumatic   Eyes:    No pallor, no icterus,   Ears:    No obvious deformity or drainage. Nose:   No nasal drainage   Throat:   Mucosa moist, no oral thrush   Neck:   Supple, no lymphadenopathy   Lungs:     Bilateral crackles    Heart:    Regular rate and rhythm,systolic murmur   Abdomen:     Soft, non-tender, bowel sounds present    Extremities:   No edema, no cyanosis ,no open wound,   Pulses:   Dorsalis pedis palpable    Skin:   no rashes or lesions      Lab Results   Component Value Date    WBC 10.0 01/10/2021    RBC 3.29 01/10/2021    HGB 8.4 01/10/2021    HCT 26.5 01/10/2021     01/10/2021    MCV 80.5 01/10/2021    MCH 25.5 01/10/2021    MCHC 31.7 01/10/2021    RDW 18.2 01/10/2021    LYMPHOPCT 0.9 01/10/2021    PROMYELOPCT 0.9 08/24/2020    MONOPCT 5.2 01/10/2021    EOSPCT 1.2 02/20/2020    BASOPCT 0.1 01/10/2021    MONOSABS 0.50 01/10/2021    LYMPHSABS 0.10 01/10/2021    EOSABS 0.00 01/10/2021    BASOSABS 0.00 01/10/2021       CMP     Lab Results   Component Value Date     01/10/2021    K 4.1 01/10/2021     01/10/2021    CO2 20 01/10/2021    BUN 43 01/10/2021    CREATININE 1.1 01/10/2021    GFRAA 56 01/10/2021    LABGLOM 46 01/10/2021    GLUCOSE 168 01/10/2021    PROT 5.6 01/10/2021    LABALBU 3.1 01/10/2021    CALCIUM 7.7 01/10/2021    BILITOT 0.4 01/10/2021    ALKPHOS 76 01/10/2021    AST 45 01/10/2021    ALT 25 01/10/2021         Hepatic Function Panel:    Lab Results   Component Value Date    ALKPHOS 76 01/10/2021    ALT 25 01/10/2021    AST 45 01/10/2021    PROT 5.6 01/10/2021    BILITOT 0.4 01/10/2021    LABALBU 3.1 01/10/2021       PT/INR:    Lab Results   Component Value Date    PROTIME 12.4 01/10/2021    INR 1.1 01/10/2021     MICROBIOLOGY:              SARS-CoV-2, PCR DETECTEDAbnormal   Not Detected Final 01/09/2021  2:15 AM  - [facility]       Radiology :    CT chest - bilateral patchy infiltrates       IMPRESSION:     1. COVID 19 pneumonia -severe s/p tocilizumab   2.  Leukocytosis -improved 3. Respiratory failure - on 15  L of NC oxgen , saturation 93 %   4. CHF         RECOMMENDATIONS:      1. Remdesivir 100 mg IV q 24 hrs  2. Decadron 6 mg IV q 24 hrs  3. Oxygen supplement   4. Monitor LFT and Cr   5.  Diuretics   ( discussed with Dr Cuca Bowles - pt's son )

## 2021-01-12 NOTE — PROGRESS NOTES
Writer notified by telemetry that Pt bradycardic on monitor. Pt found to be pulseless, apneic and unresponsive. Pt had NRB laying next to her in bed. Code Blue called. Code ended at 36 and time of death declared by attending physician.  home requested from son. Advised that he needs to speak with sister on choice. Will await return phone call.

## 2021-01-12 NOTE — PROGRESS NOTES
Date: 1/11/2021    Time: 11:20 PM    Patient Placed On BIPAP/CPAP/ Non-Invasive Ventilation? Yes    If no must comment. Facial area red/color change? No           If YES are Blister/Lesion present? No   If yes must notify nursing staff  BIPAP/CPAP skin barrier?   Yes    Skin barrier type:mepilexlite       Comments:        Jess Mcdaniel

## 2021-01-12 NOTE — CONSULTS
Authorizing Provider   QUEtiapine (SEROQUEL) 25 MG tablet Take 1 tablet by mouth 2 times daily  Patient taking differently: Take 25 mg by mouth daily  12/14/20  Yes Nando Blankenship MD   predniSONE (DELTASONE) 2.5 MG tablet 2.5 mg oral daily on Monday Wednesday and Friday 11/27/20  Yes Ignacio Be,    insulin lispro (HUMALOG) 100 UNIT/ML injection vial Inject 0-12 Units into the skin 3 times daily (with meals) 11/27/20  Yes Ignacio Be, DO   Insulin Degludec (TRESIBA SC) Inject 34 Units into the skin daily Once in the AM before breakfast   Yes Historical Provider, MD   clopidogrel (PLAVIX) 75 MG tablet TAKE ONE TABLET BY MOUTH DAILY 11/23/20  Yes Nando Blankenship MD   Continuous Blood Gluc Sensor (FREESTYLE SHON 14 DAY SENSOR) MISC E11.8 11/3/20  Yes Nando Blankenship MD   Continuous Blood Gluc  (FREESTYLE SHON 14 DAY READER) [place] e11.8 11/3/20  Yes Nando Blankenship MD   blood glucose test strips (ASCENSIA AUTODISC VI;ONE TOUCH ULTRA TEST VI) strip 1 each by In Vitro route 4 times daily Use to test blood sugar 2 times daily. E11.8 10/26/20  Yes Nando Blankenship MD   vitamin D (ERGOCALCIFEROL) 1.25 MG (10898 UT) CAPS capsule Take 50,000 Units by mouth once a week Given Wednesday   Yes Historical Provider, MD   carvedilol (COREG) 3.125 MG tablet Take 3.125 mg by mouth 2 times daily (with meals)    Yes Historical Provider, MD   bumetanide (BUMEX) 1 MG tablet Take 1 tablet by mouth daily 10/18/19  Yes Historical Provider, MD   sacubitril-valsartan (ENTRESTO) 24-26 MG per tablet Take 1 tablet by mouth 2 times daily    Yes Historical Provider, MD   allopurinol (ZYLOPRIM) 100 MG tablet Take 100 mg by mouth 2 times daily  12/27/17  Yes Historical Provider, MD   hydrOXYzine (ATARAX) 25 MG tablet Take 1 tablet by mouth every 8 hours as needed for Itching 11/23/20   Nando Blankenship MD       Allergies:  Iodine and Iv dye [iodides]     Review of Systems:   · Constitutional: there has been no unanticipated weight loss. There's been no significant change in energy level, sleep pattern or activity level. No fever chills or rigors. · Eyes: No visual changes or diplopia. No scleral icterus. · ENT: No Headaches, hearing loss or vertigo. No mouth sores or sore throat. No change in taste or smell. · Cardiovascular:   Denies chest discomfort presently, + dyspnea on exertion,  But no significant palpitations, loss of consciousness, no phlebitis, no claudication. · Respiratory: No cough or wheezing, no sputum production. No hemoptysis, pleuritic pain. + short of breath with minimal effort. · Gastrointestinal: No abdominal pain, appetite loss, blood in stools. No change in bowel habits. No hematemesis  · Genitourinary: No dysuria, trouble voiding or hematuria. No nocturia or increased frequency. · Musculoskeletal:  No gait disturbance, weakness or joint complaints. · Integumentary: No rash or pruritis. · Neurological: No headache, diplopia, change in muscle strength, numbness or tingling. No change in gait, balance, coordination, mood, affect, memory, mentation, behavior. · Psychiatric: No anxiety or depression. · Endocrine: No temperature intolerance. No excessive thirst, fluid intake, or urination. No tremor. · Hematologic/Lymphatic: No abnormal bruising or bleeding, blood clots or swollen lymph nodes. · Allergic/Immunologic: No nasal congestion or hives. Physical Examination:    Vital Signs: /72   Pulse 100   Temp 97.6 °F (36.4 °C) (Oral)   Resp (!) 33   Ht 5' 4\" (1.626 m)   Wt 178 lb (80.7 kg)   SpO2 100%   BMI 30.55 kg/m²   General appearance: Well preserved, mesomorphic body habitus, alert, no distress. Skin: Skin color, texture, turgor normal. No rashes or lesions. No induration or tightening palpated. Head: Normocephalic. No masses, lesions, tenderness or abnormalities  Eyes: Conjunctivae/corneas clear. PERRL, EOMs intact. Sclera non icteric. Ears: External ears normal. Canals clear.  TM's clear bilaterally. Hearing normal to finger rub. Nose/Sinuses: Nares normal. Septum midline. Mucosa normal. No drainage or sinus tenderness. Oropharynx: Lips, mucosa, and tongue normal. Oropharynx clear with no exudate seen. Neck: Neck supple and symmetric. No adenopathy. Thyroid symmetric, normal size, without nodules. Trachea is midline. Carotids brisk in upstroke without bruits, no abnormal JVP noted at 45°. Chest: Even excursion  Lungs: Lungs   Grosslyclear to auscultation bilaterally. No retractions or use of accessory muscles. No tactile vocal fremitus. No rhonchi, crackles or rales. Heart:  S1 > S2. Regular rhythm. No gallop Grade 0-1/5.COLTPII systolic murmur heard at the apex radiating to the axilla. No rub, palpable thrill or heave noted. PMI 5th intercostal space midclavicular line. Abdomen: Abdomen soft, moderately protuberant, non-tender. BS normal. No masses, organomegaly. No hernia noted. Extremities: Extremities normal. No deformities, edema, or skin discoloration. No cyanosis or clubbing noted to the nails. Peripheral pulses present 2+ upper extremities and present 1+  lower extremities. Musculoskeletal: Spine ROM normal. Muscular strength intact. Neuro: Cranial nerves intact. Motor: Strength 5/5 in all extremities. Reflexes 2+ in all extremities. No focal weakness. Sensory: grossly normal to touch. Coordination intact.     Pertinent Labs:  CBC:   Recent Labs     01/09/21  0533 01/10/21  1000 01/11/21  1852   WBC 15.5* 10.0 6.0   HGB 8.9* 8.4* 10.1*    236 294     BMP:  Recent Labs     01/09/21  0533 01/10/21  1000 01/11/21  1852    134 136   K 3.9 4.1 4.4    101 100   CO2 21* 20* 19*   BUN 40* 43* 56*   CREATININE 1.2* 1.1* 1.3*   GLUCOSE 131* 168* 248*   LABGLOM 42 46 38     ABGs:   Lab Results   Component Value Date    PH 7.397 01/11/2021    PO2 58.4 01/11/2021    PCO2 28.0 01/11/2021     INR:   Recent Labs     01/09/21  0133 01/09/21  0533 01/10/21  1000   INR 1.1 1.1 1.1 hospital problems. *      Plan:  Based upon Mrs. Amelia Jung clinical presentation and ongoing dyspnea certainly a significant amount is secondary to underlying Covid-19 virus but likewise she has known marked mitral regurgitation despite having previously undergone a [address] [place] [address],[address]. Procedure. I have discussed my thoughts with her hospitalist , Dr. Dana Garcia and she will receive a dose of bumetanide intravenously and assess her urine output respiratory rate and blood pressure response. We will also prophylax with both clopidogrel and a factor Xa inhibitor. I have spent more than 45 minutes face to face with Migdalia Greco reviewing notes and laboratory data with greater than 50% of this time instructing and counseling the patient regarding my findings and recommendations and I have answered all questions as posed to me by Ms. Kalin well as her son by phone Dr. Naldo Echols. . Thank you, Belle Faulkner MD for allowing me to consult in the care of this patient. Jen aCntu DO, FACP, FACC, FSCAI    NOTE:  This report was transcribed using voice recognition software. Every effort was made to ensure accuracy; however, inadvertent computerized transcription errors may be present.

## 2021-01-12 NOTE — PROGRESS NOTES
Date: 1/11/2021    Time: 7:54 PM    Patient Placed On BIPAP/CPAP/ Non-Invasive Ventilation? Yes    If no must comment. Facial area red/color change? No           If YES are Blister/Lesion present? No   If yes must notify nursing staff  BIPAP/CPAP skin barrier?   Yes    Skin barrier type:mepilexlite       Comments:        Charlie Hicks

## 2021-01-12 NOTE — CONSULTS
Comment: Renal consult cancelled; patient with worsening resp distress earlier this am, limited code status with no intubation; spoke with family on floor; spike with Dr Jennifer Hayward on floor; also spoke with Dr Ted Velázquez , patientsw son    Elie Augustin.  M,D

## 2021-01-12 NOTE — SIGNIFICANT EVENT
Rapid Response Team Note  Date of event: 1/11/2021   Time of event: 06:30  Luis Peguero 80y.o. year old female   YOB: 1927   Admit date:  1/9/2021   Location: 8447/7747-K   Witnessed? : [x]Yes  [] No  Monitored? : [x]Yes  [] No  Code status: [] Full  [x] DNR-CCA  []DNR-CC  ______________________________________________________________________  Reason for RRT:    [] RR < 8     [x] RR > 28   [] SpO2 <90%   [] HR < 40 bpm   [] HR > 130 bpm  [] SBP < 90 mmHg    [x] SpO2 <90%   [] LOC   [] Seizures    [] Significant Bleeding Event    [] Other:     Subjective:   CTSP regarding the above event, patient noted to be hypoxic in the 70s on 30 L of oxygen via nasal cannula plus nonrebreather. She was also tachypneic. Briefly, she is currently being managed for COVID-19 pneumonia, on remdesivir, s/p 1 dose of Tocilizumab; heart failure preserved ejection fraction, hypertension, hyperlipidemia, type II DM, and history of CVA. She is being followed by infectious disease, pulmonology. On arrival, patient was awake, alert able to follow commands, but agitated. ABG done showed 7.37 /35.8/52.4/20.2. Chest x-ray done showed diffuse bilateral patchy infiltrates, as well as possible pulmonary congestion. She was given 20 mg of IV Lasix x1 dose, placed on AVAPS, and repeat stat labs done. Medical residents discussed with patient's family -   daughter Zoya Pierson, and son Dr. Nilton Garcia, they would like the patient to be tried on NIV, but okay with intubation if needed. They will however like to be informed prior to intubation. Patient was given 25 mg of IV Benadryl per recommendation from pulmonology to help with current agitation without causing drowsiness.     Objective:   Vital signs: Temp: 97.6/BP: 128/60/RR: 22/HR: 110  Initial Condition:  Conscious   [x] Yes  [] No     Breathing [x] Yes  [] No     Pulse  [x] Yes  [] No    Airway:   [x] Open/ Clear     Intervention: [] None  [] Pooled secretions     [] Suctioned  [] Stridor      [] Intubation    Lungs:   [x] Symmetrical chest rise/ CTABL Intervention: [] None  [x] Use of accessory muscles    [x] NIV (CPAP/BiPAP)  [] Cyanosis      [] Nasal Oxygen/Mask  [] Wheezing       [x] ABG             [x] CXR  [] Other:     Circulation:   Rhythm:  [] Sinus [x] Other: Afib on telemtry  Intervention: [] None            [] IV Access  [] Peripheral              [] Central            [] EKG            [] Cardioversion            [] Defibrillation     Capillary Refill:  [] > 2 seconds [x] < 2 seconds    Neurologic:   [] NIHSS      [] Pupillary Response:  PERRL   Response to pain:   [x] Yes  [] No  Follow commands:  [x] Yes  [] No  Facial asymmetry:  [] Yes  [x] No  Motor strength:  [x] Equal  [] Focal deficit:   Diagnostic Test:  Blood Sugar:  186mg/dL  EKG:    Not done  ABG:      Lab Results   Component Value Date    PH 7.370 01/11/2021    PCO2 35.8 01/11/2021    PO2 52.4 01/11/2021    HCO3 20.2 01/11/2021    O2SAT 84.3 01/11/2021     Medication(s) Given:  Lasix 20 mg IV x1 dose, Benadryl 25 mg IV x1 dose    Infusion(s):   None  Imaging:   [x] CXR:   [] Normal         [] Pneumothorax         [x] Pulmonary Edema  [x] Infiltrate          [] CT Head  not done    Laboratory Tests:     CBC with Differential:    Lab Results   Component Value Date    WBC 6.0 01/11/2021    RBC 3.99 01/11/2021    HGB 10.1 01/11/2021    HCT 33.0 01/11/2021     01/11/2021    MCV 82.7 01/11/2021    MCH 25.3 01/11/2021    MCHC 30.6 01/11/2021    RDW 18.2 01/11/2021    LYMPHOPCT 4.5 01/11/2021    PROMYELOPCT 0.9 08/24/2020    MONOPCT 3.7 01/11/2021    EOSPCT 1.2 02/20/2020    BASOPCT 0.0 01/11/2021    MONOSABS 0.22 01/11/2021    LYMPHSABS 0.27 01/11/2021    EOSABS 0.00 01/11/2021    BASOSABS 0.00 01/11/2021     BMP:    Lab Results   Component Value Date     01/11/2021    K 4.4 01/11/2021    K 4.1 01/10/2021     01/11/2021    CO2 19 01/11/2021    BUN 56 01/11/2021    LABALBU 3.1 01/10/2021 CREATININE 1.3 01/11/2021    CALCIUM 7.8 01/11/2021    GFRAA 46 01/11/2021    LABGLOM 38 01/11/2021    GLUCOSE 248 01/11/2021     Troponin:    Lab Results   Component Value Date    TROPONINI <0.01 01/11/2021     ABG:    Lab Results   Component Value Date    PH 7.370 01/11/2021    PCO2 35.8 01/11/2021    PO2 52.4 01/11/2021    HCO3 20.2 01/11/2021    BE -4.5 01/11/2021    O2SAT 84.3 01/11/2021       Assessment  1. Acute hypoxic respiratory failure  -   likely secondary to Covid seen pneumonia, rule out superimposed bacterial infection, also possibly component of acute exacerbation of heart failure preserved ejection fraction. Patient being diuresed with Bumex 2 mg daily. Chest x-ray with bilateral patchy infiltrates, possibly pulmonary vascular congestion. She is s/p Tocilizumab, and on Decadron and remdesivir  2. Heart failure preserved ejection fraction -with possible acute decompensation  3. Type II DM. 4. CKD stage III. Plan  -IV Lasix 20 mg x 1 dose now.  -We will placed on BiPAP, repeat ABG in 1 hour.  -Per family, okay with intubation when absolutely needed.  -Follow BMP CMP troponin, proBNP, and lactic acid. -Monitor respiratory status  ?   Disposition:  [x] No transfer   [] Transfer to monitor floor  [] Transfer to: [] MICU [] NICU [] CCU [] SICU    Patients family updated: [] Yes  [] No   Discussed with: Covering attending: Dr Edmar Elizondo  Pulmonologist: Dr Belle Leyva MD PGY-3  1/11/2021 9:13 PM  Attending Physician: Dr Edmar Elizondo

## 2021-01-12 NOTE — CODE DOCUMENTATION
At 8257 Telemetry notified nurse Shabbir Gaspar that patient was kvng and she went in to check patient and patient did not have a pulse. At [address] called code blue. Patient was [facility]. Dr. Velasquez Bares on floor and called family immediately to clarify code status. 0906-1mg epinephrine given    0908-patient determined dead. Family did not wish to have anything else done.

## 2021-01-12 NOTE — PROGRESS NOTES
Date: 1/11/2021    Time: 7:20 PM    Patient Placed On BIPAP/CPAP/ Non-Invasive Ventilation? Yes    If no must comment. Facial area red/color change? No           If YES are Blister/Lesion present? No   If yes must notify nursing staff  BIPAP/CPAP skin barrier?   Yes    Skin barrier type:mepilexlite       Comments:       01/11/21 1919   NIV Type   NIV Started/Stopped On   Equipment Type v60   Mode Bilevel   Mask Type Full face mask   Mask Size Small   Settings/Measurements   IPAP 12 cmH20   CPAP/EPAP 6 cmH2O   Resp (!) 31   FiO2  90 %   Vt Exhaled 522 mL   Minute Volume 16.8 Liters   Mask Leak (lpm) 58 lpm   Comfort Level Fair   Using Accessory Muscles No   SpO2 95           Kevin Kuo

## 2021-01-12 NOTE — PROGRESS NOTES
Notified Ivy with [facility] of patient death. Patient is a rule out due to covid and can be released. [id number].

## 2021-01-14 LAB
BLOOD CULTURE, ROUTINE: NORMAL
CULTURE, BLOOD 2: NORMAL

## 2021-01-30 NOTE — DISCHARGE SUMMARY
Hospital Medicine Discharge Summary    Patient ID: Seferino Goodwin      Patient's PCP: Luda Gold MD    Admitting Physician: Paula Cobb MD  Discharge Physician: Lukas Patrick MD     Admit Date: 2021     Disposition:     Discharge Diagnoses:   Principal Problem:    Pneumonia due to COVID-19 virus  Active Problems:    Chronic systolic congestive heart failure ([address]    Essential hypertension    CKD (chronic kidney disease) stage 3, GFR 30-59 ml/min ([address]    BQIEQ-13    Acute metabolic encephalopathy  Resolved Problems:    * No resolved hospital problems. *      Date of Death:21  Time of Death:908    Immediate Cause of Death:COVID-19 pneumonia  Underlying Conditions:acute hypoxic respiratory failure, CKD, mitral regurgitation  Other Contributing Conditions:CKD    Hospital Course: Pt admitted for worsening shortness of breath, In ED was found to be COVID positive. She was hypoxic and requiring bipap for respiratory failure. Cardiology was consulted given hx of mitral regurgitaiton and diuresis was attempted. She was also started on steroids and remdesivir, however respiratory distress worsened. On am of 21 pt was found with nrb off her face and was pulseless. Family was contacted and requested we try resuscitation with epinephrine only; she was given one dose of epinephrine but did not have a pulse. Family requested no additional measures to be done. Consults:  IP CONSULT TO INTERNAL MEDICINE  IP CONSULT TO PHARMACY  IP CONSULT TO PHARMACY  IP CONSULT TO INFECTIOUS DISEASES  IP CONSULT TO PULMONOLOGY  IP CONSULT TO PHARMACY  IP CONSULT TO CARDIOLOGY  IP CONSULT TO NEPHROLOGY    Signed:  Lukas Patrick MD   2021    Thank you Luda Gold MD for the opportunity to be involved in this patient's care. If you have any questions or concerns please feel free to contact me at [phone].

## 2024-08-07 NOTE — PROGRESS NOTES
[facility] Physical Medicine and Rehabilitation  Comprehensive Progress Note    Subjective:      Kamilah Auguste is a 80 y.o. female admitted to inpatient rehabilitation for impairments and activities limitations in ADLs and mobility secondary to left hip fracture, s/p closed reduction and cephalomedullary nail fixation . No acute events overnight. Endorses left hip pain, which she states is improved with PRN tramadol. No cp, sob, n/v. Patient reports she did have a BM, not documented in nursing notes, will follow up. Some increased edema in LLE. The patient's medical records have been reviewed.     Scheduled Meds:metoclopramide, 2.5 mg, TID AC  docusate sodium, 100 mg, BID  senna, 1 tablet, Nightly  amoxicillin-clavulanate, 1 tablet, 2 times per day  aspirin, 81 mg, Daily  carvedilol, 6.25 mg, BID WC  bumetanide, 1 mg, Daily  colchicine, 0.6 mg, Daily  clopidogrel, 75 mg, Daily  potassium chloride, 20 mEq, BID WC  allopurinol, 100 mg, Daily  predniSONE, 5 mg, Daily  glipiZIDE, 5 mg, QAM AC  pantoprazole, 40 mg, QAM AC  vitamin D, 50,000 Units, Once per day on Wed  sacubitril-valsartan, 1 tablet, BID      Continuous Infusions:   dextrose       PRN Meds:hydrALAZINE, 25 mg, Q6H PRN  traMADol, 50 mg, Q6H PRN  calcium carbonate, 500 mg, BID PRN  acetaminophen, 650 mg, Q4H PRN  polyethylene glycol, 17 g, Daily PRN  glucose, 15 g, PRN  dextrose, 12.5 g, PRN  glucagon (rDNA), 1 mg, PRN  dextrose, 100 mL/hr, PRN         Objective:      Vitals:    06/17/20 1500 06/17/20 1600 06/18/20 0600 06/18/20 0655   BP: (!) 121/58 (!) 162/87  135/69   Pulse: 69 74  76   Resp: 18   16   Temp: 97.6 °F (36.4 °C)   98.8 °F (37.1 °C)   TempSrc: Temporal   Temporal   SpO2: 94%      Weight:   163 lb (73.9 kg)    Height:           General appearance: Alert, NAD, seen in OT this afternoon  Lungs: CTA b/l, no w/r/r  Heart: RRR  Abdomen: soft, non-tender, normal bowel sounds  Extremities: LLE edema, +calf tenderness, negative Anna Light', no warmth, no cords. Skin: Area of prior blister on L hip now appears as small area of ecchymosis, no longer open and no drainage. L hip surgical incision c/d/i with staples in place. Neurologic: Alert, oriented. Strength 5/5 BUE. Strength 2/3 b/l HF and left KE, 4/5 right KE, bilateral DF/PF. Laboratory:    Lab Results   Component Value Date    WBC 9.7 06/17/2020    HGB 9.1 (L) 06/17/2020    HCT 28.9 (L) 06/17/2020    MCV 87.3 06/17/2020     06/17/2020     Lab Results   Component Value Date     06/17/2020    K 4.5 06/17/2020     06/17/2020    CO2 21 06/17/2020    BUN 24 06/17/2020    CREATININE 1.3 06/17/2020    GLUCOSE 137 06/17/2020    CALCIUM 8.7 06/17/2020      Lab Results   Component Value Date    ALT 17 02/20/2020    AST 24 02/20/2020    ALKPHOS 99 02/20/2020    BILITOT 0.7 02/20/2020       Lab Results   Component Value Date    COLORU YELLOW 12/08/2016    GLUCOSEU NEG 12/08/2016    KETUA NEG 12/08/2016    BILIRUBINUR NEG 12/08/2016     Lab Results   Component Value Date    LABA1C 7.7 (H) 11/21/2019       Functional Status:   Physical Therapy:  Bed mobility: Mod A  Transfers: Mod A  Ambulation: 12 ft [employer] Min/Mod A     Occupational Therapy:  Feeding: Setup  Grooming: Min A  UB dressing: Min A  LB dressing: Max A     Assessment/Plan:     80 y.o. female admitted to inpatient rehabilitation for impairments and activities limitations in ADLs and mobility secondary to left hip fracture, s/p closed reduction and cephalomedullary nail fixation .    -Intertrochanteric fracture of the left femur: s/p  left hip closed reduction and cephalomedullary nail fixation on 6/4/2020 by Dr. Liss Sherman. WBAT LLE. Continue acute rehab program.  -Acute pain: Tramadol or tylenol PRN  -Acute blood loss anemia: received 2 units pRBC at [facility] (6/5, 6/7). H&H improved.  Monitor H&H.   --Aspiration pneumonia: seen by ID at OSH, treated with Unasyn then switched to oral Levaquin and Augmentin to complete course (stop date 6/19/2020)  -DM: continue glipizide, BG adequately controlled, monitor  -HTN: continue home medications, monitor  -CKD- baseline Cr appears to be 1.4. --Cr at baseline, monitor  -DVT prophylaxis: Home dose Aspirin and Plavix per Ortho recommendations.     -Will check LLE doppler to r/o DVT  -Labs in am  -Team conference tomorrow       Electronically signed by Valentin Avendaño MD on 6/18/2020 at 1:05 PM no